# Patient Record
Sex: FEMALE | Race: WHITE | Employment: FULL TIME | URBAN - METROPOLITAN AREA
[De-identification: names, ages, dates, MRNs, and addresses within clinical notes are randomized per-mention and may not be internally consistent; named-entity substitution may affect disease eponyms.]

---

## 2017-01-02 ENCOUNTER — HOSPITAL ENCOUNTER (OUTPATIENT)
Age: 22
Discharge: HOME | End: 2017-01-02
Attending: EMERGENCY MEDICINE

## 2017-01-02 VITALS
BODY MASS INDEX: 31.98 KG/M2 | HEART RATE: 99 BPM | RESPIRATION RATE: 12 BRPM | SYSTOLIC BLOOD PRESSURE: 120 MMHG | DIASTOLIC BLOOD PRESSURE: 74 MMHG | HEIGHT: 66 IN | TEMPERATURE: 99 F | WEIGHT: 199 LBS

## 2017-01-02 DIAGNOSIS — H57.89 IRRITATION OF LEFT EYE: Primary | ICD-10-CM

## 2017-01-02 PROCEDURE — 99213 OFFICE O/P EST LOW 20 MIN: CPT

## 2017-01-03 NOTE — ED PROVIDER NOTES
Patient Seen in: [address]    History   Patient presents with:  Irritation    Stated Complaint: Left Eye Irritation    HPI    40-year-old female who wears contacts presents for complaint of left eye irritation.   Patient Device --        Current:/74 mmHg  Pulse 99  Temp(Src) 98.8 °F (37.1 °C) (Temporal)  Resp 12  Ht 167.6 cm (5' 6\")  Wt 90.266 kg  BMI 32.13 kg/m2  LMP 12/07/2016 (Approximate)    Right Eye Chart Acuity: 20/30, Corrected    Left Eye Chart Acuity: 20/2 as soon as possible for a visit in 2 days        Medications Prescribed:  Current Discharge Medication List

## 2017-06-19 ENCOUNTER — OFFICE VISIT (OUTPATIENT)
Dept: OTHER | Facility: HOSPITAL | Age: 22
End: 2017-06-19
Attending: PREVENTIVE MEDICINE

## 2017-06-19 DIAGNOSIS — Z01.84 IMMUNITY STATUS TESTING: Primary | ICD-10-CM

## 2017-06-19 PROCEDURE — 86765 RUBEOLA ANTIBODY: CPT

## 2017-06-19 PROCEDURE — 86735 MUMPS ANTIBODY: CPT

## 2017-06-19 PROCEDURE — 86787 VARICELLA-ZOSTER ANTIBODY: CPT

## 2017-06-19 PROCEDURE — 86480 TB TEST CELL IMMUN MEASURE: CPT

## 2017-06-19 PROCEDURE — 86706 HEP B SURFACE ANTIBODY: CPT

## 2017-06-19 PROCEDURE — 86762 RUBELLA ANTIBODY: CPT

## 2017-07-15 ENCOUNTER — APPOINTMENT (OUTPATIENT)
Dept: CT IMAGING | Facility: HOSPITAL | Age: 22
End: 2017-07-15
Attending: NURSE PRACTITIONER
Payer: COMMERCIAL

## 2017-07-15 ENCOUNTER — HOSPITAL ENCOUNTER (EMERGENCY)
Facility: HOSPITAL | Age: 22
Discharge: HOME | End: 2017-07-15
Payer: COMMERCIAL

## 2017-07-15 ENCOUNTER — HOSPITAL ENCOUNTER (OUTPATIENT)
Age: 22
Discharge: HOME | End: 2017-07-15
Attending: EMERGENCY MEDICINE
Payer: COMMERCIAL

## 2017-07-15 VITALS
BODY MASS INDEX: 42.68 KG/M2 | RESPIRATION RATE: 18 BRPM | SYSTOLIC BLOOD PRESSURE: 122 MMHG | TEMPERATURE: 98 F | DIASTOLIC BLOOD PRESSURE: 84 MMHG | HEIGHT: 64 IN | OXYGEN SATURATION: 100 % | HEART RATE: 67 BPM | WEIGHT: 250 LBS

## 2017-07-15 VITALS
DIASTOLIC BLOOD PRESSURE: 83 MMHG | SYSTOLIC BLOOD PRESSURE: 126 MMHG | OXYGEN SATURATION: 100 % | WEIGHT: 250 LBS | HEIGHT: 64 IN | RESPIRATION RATE: 18 BRPM | BODY MASS INDEX: 42.68 KG/M2 | TEMPERATURE: 98 F | HEART RATE: 75 BPM

## 2017-07-15 DIAGNOSIS — R10.31 ABDOMINAL PAIN, RIGHT LOWER QUADRANT: Primary | ICD-10-CM

## 2017-07-15 DIAGNOSIS — I88.0 MESENTERIC ADENITIS: Primary | ICD-10-CM

## 2017-07-15 LAB
ALBUMIN SERPL BCP-MCNC: 3.9 G/DL (ref 3.5–4.8)
ALP SERPL-CCNC: 55 U/L (ref 32–100)
ALT SERPL-CCNC: 41 U/L (ref 14–54)
ANION GAP SERPL CALC-SCNC: 7 MMOL/L (ref 0–18)
AST SERPL-CCNC: 34 U/L (ref 15–41)
B-HCG UR QL: NEGATIVE
BACTERIA UR QL AUTO: NEGATIVE /HPF
BILIRUB DIRECT SERPL-MCNC: 0.2 MG/DL (ref 0–0.2)
BILIRUB SERPL-MCNC: 1 MG/DL (ref 0.3–1.2)
BILIRUB UR QL: NEGATIVE
BUN SERPL-MCNC: 10 MG/DL (ref 8–20)
BUN/CREAT SERPL: 16.4 (ref 10–20)
CALCIUM SERPL-MCNC: 8.9 MG/DL (ref 8.5–10.5)
CHLORIDE SERPL-SCNC: 107 MMOL/L (ref 95–110)
CO2 SERPL-SCNC: 24 MMOL/L (ref 22–32)
COLOR UR: YELLOW
CREAT SERPL-MCNC: 0.61 MG/DL (ref 0.5–1.5)
GLUCOSE SERPL-MCNC: 76 MG/DL (ref 70–99)
GLUCOSE UR-MCNC: NEGATIVE MG/DL
HGB UR QL STRIP.AUTO: NEGATIVE
LIPASE SERPL-CCNC: 14 U/L (ref 22–51)
NITRITE UR QL STRIP.AUTO: NEGATIVE
OSMOLALITY UR CALC.SUM OF ELEC: 284 MOSM/KG (ref 275–295)
PH UR: 5 [PH] (ref 5–8)
POTASSIUM SERPL-SCNC: 3.6 MMOL/L (ref 3.3–5.1)
PROT SERPL-MCNC: 6.6 G/DL (ref 5.9–8.4)
PROT UR-MCNC: NEGATIVE MG/DL
RBC #/AREA URNS AUTO: 5 /HPF
SODIUM SERPL-SCNC: 138 MMOL/L (ref 136–144)
SP GR UR STRIP: 1.02 (ref 1–1.03)
URINE BILIRUBIN: NEGATIVE
URINE BLOOD: NEGATIVE
URINE COLOR: YELLOW
URINE GLUCOSE: NEGATIVE MG/DL
URINE KETONES: NEGATIVE MG/DL
URINE NITRITE: NEGATIVE
URINE PH: 5.5
URINE PROTEIN: NEGATIVE MG /DL
URINE SPECIFIC GRAVITY: 1.01
URINE UROBILINOGEN: 0.2 MG/DL
UROBILINOGEN UR STRIP-ACNC: <2
VIT C UR-MCNC: NEGATIVE MG/DL
WBC #/AREA URNS AUTO: 9 /HPF

## 2017-07-15 PROCEDURE — 87086 URINE CULTURE/COLONY COUNT: CPT | Performed by: NURSE PRACTITIONER

## 2017-07-15 PROCEDURE — 81001 URINALYSIS AUTO W/SCOPE: CPT | Performed by: NURSE PRACTITIONER

## 2017-07-15 PROCEDURE — 99215 OFFICE O/P EST HI 40 MIN: CPT

## 2017-07-15 PROCEDURE — 74177 CT ABD & PELVIS W/CONTRAST: CPT | Performed by: NURSE PRACTITIONER

## 2017-07-15 PROCEDURE — 96360 HYDRATION IV INFUSION INIT: CPT

## 2017-07-15 PROCEDURE — 99284 EMERGENCY DEPT VISIT MOD MDM: CPT

## 2017-07-15 PROCEDURE — 85060 BLOOD SMEAR INTERPRETATION: CPT | Performed by: NURSE PRACTITIONER

## 2017-07-15 PROCEDURE — 83690 ASSAY OF LIPASE: CPT | Performed by: NURSE PRACTITIONER

## 2017-07-15 PROCEDURE — 81002 URINALYSIS NONAUTO W/O SCOPE: CPT

## 2017-07-15 PROCEDURE — 81025 URINE PREGNANCY TEST: CPT

## 2017-07-15 PROCEDURE — 80048 BASIC METABOLIC PNL TOTAL CA: CPT | Performed by: NURSE PRACTITIONER

## 2017-07-15 PROCEDURE — 80076 HEPATIC FUNCTION PANEL: CPT | Performed by: NURSE PRACTITIONER

## 2017-07-15 PROCEDURE — 85025 COMPLETE CBC W/AUTO DIFF WBC: CPT | Performed by: NURSE PRACTITIONER

## 2017-07-15 NOTE — ED INITIAL ASSESSMENT (HPI)
Pt sent from immediate care with c/o pain to right lower quadrant pain for 1 week. States she vomited yesterday, and feels some \"cold sweats\". Pain is worse with sitting or lying.

## 2017-07-15 NOTE — ED NOTES
Seen and examined by dr Juan Jose Pickering, pt to go to ed for further eval and management, npo instructed and maintained, report called to [facility], npo instructed and maintained

## 2017-07-15 NOTE — ED PROVIDER NOTES
Patient Seen in: [facility]    History   Patient presents with:  Abdomen/Flank Pain (GI/)    Stated Complaint: vomiting, abdominal pain    HPI    The patient is a 73-year-old female with past history depression, status 18   Ht 162.6 cm (5' 4\")   Wt 113.4 kg   LMP 07/14/2017 (Approximate)   SpO2 100%   BMI 42.91 kg/m²         Physical Exam    Constitutional: Well-developed obese in no acute distress  Head: Normocephalic, no swelling or tenderness  Eyes: Nonicteric sclera REBECCA Prince  78.    In 1 week        Medications Prescribed:  Current Discharge Medication List

## 2017-07-15 NOTE — ED NOTES
Prelim %:    Neut 50.7  Lymp 26.5  Mono 5.5  Eos 16.5  Baso 0.8    Absolute   Neut 5.0  Lymp 2.6  Mono 0.6  Eos 1.6  Baso 0.1

## 2017-07-15 NOTE — ED PROVIDER NOTES
Patient Seen in: [facility] Emergency Department    History   CC: abd pain  HPI: Rodrigo Charles 23year old female  who presents to the ER c/o diffuse right-sided abdominal pain that has been intermittently present over the past approximately 1 we %  O2 Device: None (Room air)    Current:/84   Pulse 67   Temp 98.4 °F (36.9 °C) (Oral)   Resp 18   Ht 162.6 cm (5' 4\")   Wt 113.4 kg   LMP 07/14/2017 (Approximate)   SpO2 100%   BMI 42.91 kg/m²         General - Appears well, non-toxic and in NAD time who also evaluated this patient. He will follow up on patient's blood, urine and CT results. Disposition and Plan     Clinical Impression:  No diagnosis found. Disposition:  There is no disposition on file for this visit.     Follow-up:  No fo

## 2017-07-17 LAB
BASOPHILS # BLD: 0.1 K/UL (ref 0–0.2)
BASOPHILS NFR BLD: 1 %
EOSINOPHIL # BLD: 1.6 K/UL (ref 0–0.7)
EOSINOPHIL NFR BLD: 17 %
ERYTHROCYTE [DISTWIDTH] IN BLOOD BY AUTOMATED COUNT: 13.6 % (ref 11–15)
HCT VFR BLD AUTO: 37.9 % (ref 35–48)
HGB BLD-MCNC: 12.7 G/DL (ref 12–16)
LYMPHOCYTES # BLD: 2.6 K/UL (ref 1–4)
LYMPHOCYTES NFR BLD: 27 %
MCH RBC QN AUTO: 28 PG (ref 27–32)
MCHC RBC AUTO-ENTMCNC: 33.6 G/DL (ref 32–37)
MCV RBC AUTO: 83.3 FL (ref 80–100)
MONOCYTES # BLD: 0.6 K/UL (ref 0–1)
MONOCYTES NFR BLD: 6 %
NEUTROPHILS # BLD AUTO: 5 K/UL (ref 1.8–7.7)
NEUTROPHILS NFR BLD: 51 %
PLATELET # BLD AUTO: 302 K/UL (ref 140–400)
PMV BLD AUTO: 8.5 FL (ref 7.4–10.3)
RBC # BLD AUTO: 4.55 M/UL (ref 3.7–5.4)
WBC # BLD AUTO: 10 K/UL (ref 4–11)

## 2017-08-20 ENCOUNTER — HOSPITAL ENCOUNTER (OUTPATIENT)
Age: 22
Discharge: HOME | End: 2017-08-20
Payer: COMMERCIAL

## 2017-08-20 ENCOUNTER — APPOINTMENT (OUTPATIENT)
Dept: GENERAL RADIOLOGY | Age: 22
End: 2017-08-20
Attending: NURSE PRACTITIONER
Payer: COMMERCIAL

## 2017-08-20 VITALS
TEMPERATURE: 98 F | OXYGEN SATURATION: 100 % | DIASTOLIC BLOOD PRESSURE: 102 MMHG | WEIGHT: 250 LBS | HEIGHT: 64 IN | HEART RATE: 73 BPM | RESPIRATION RATE: 18 BRPM | BODY MASS INDEX: 42.68 KG/M2 | SYSTOLIC BLOOD PRESSURE: 129 MMHG

## 2017-08-20 DIAGNOSIS — S62.91XA CLOSED FRACTURE OF RIGHT HAND, INITIAL ENCOUNTER: Primary | ICD-10-CM

## 2017-08-20 PROCEDURE — 73130 X-RAY EXAM OF HAND: CPT | Performed by: NURSE PRACTITIONER

## 2017-08-20 PROCEDURE — 99214 OFFICE O/P EST MOD 30 MIN: CPT

## 2017-08-20 PROCEDURE — 29125 APPL SHORT ARM SPLINT STATIC: CPT

## 2017-08-20 NOTE — ED PROVIDER NOTES
Patient presents with:  Hand Pain      HPI:     Bandar Turk is a 23year old female who presents today with a chief complaint of pain in the right hand with swelling after an injury that occurred 2 days ago.   The patient states she jammed her hand while aspect of the right hand that radiates into the digits. No wrist pain. No deformities. Right hand dominant.   Point Tenderness: No    BP (!) 129/102   Pulse 73   Temp 98 °F (36.7 °C) (Oral)   Resp 18   Ht 162.6 cm (5' 4\")   Wt 113.4 kg   LMP 08/18/2017 (Ex instructions for your condition today.     Follow Up with:  Bambi Martin MD  [facility]  [id number]    Schedule an appointment as soon as possible for a visit in 2 days      Loyd Ku

## 2017-08-20 NOTE — ED INITIAL ASSESSMENT (HPI)
PATIENT ARRIVED AMBULATORY TO ROOM C/O RIGHT HAND PAIN. PATIENT STATES \"I WAS OPENING UP AN UMBRELLA 2 DAYS AGO AND I JAMMED MY HAND\" NO NUMBNESS/TINGLING TO FINGERS.

## 2017-08-23 ENCOUNTER — LAB ENCOUNTER (OUTPATIENT)
Dept: LAB | Age: 22
End: 2017-08-23
Attending: ORTHOPAEDIC SURGERY
Payer: COMMERCIAL

## 2017-08-23 DIAGNOSIS — Q78.0 OSTEOGENESIS IMPERFECTA: Primary | ICD-10-CM

## 2017-08-23 LAB
CALCIUM SERPL-MCNC: 8.7 MG/DL (ref 8.5–10.5)
TSH SERPL-ACNC: 2.14 UIU/ML (ref 0.45–5.33)

## 2017-08-23 PROCEDURE — 84443 ASSAY THYROID STIM HORMONE: CPT

## 2017-08-23 PROCEDURE — 82310 ASSAY OF CALCIUM: CPT

## 2017-08-23 PROCEDURE — 82652 VIT D 1 25-DIHYDROXY: CPT

## 2017-08-23 PROCEDURE — 84445 ASSAY OF TSI GLOBULIN: CPT

## 2017-08-23 PROCEDURE — 36415 COLL VENOUS BLD VENIPUNCTURE: CPT

## 2017-08-29 LAB
1,25-DIHYDROXYVITAMIN D: 65.1 PG/ML
THYROID STIMULATING IMMUNOGLOBULIN: 93 %

## 2017-09-22 ENCOUNTER — OFFICE VISIT (OUTPATIENT)
Dept: OTHER | Facility: HOSPITAL | Age: 22
End: 2017-09-22
Attending: PREVENTIVE MEDICINE

## 2017-09-22 DIAGNOSIS — Z01.84 IMMUNITY STATUS TESTING: Primary | ICD-10-CM

## 2017-09-22 PROCEDURE — 86480 TB TEST CELL IMMUN MEASURE: CPT

## 2017-09-27 ENCOUNTER — APPOINTMENT (OUTPATIENT)
Dept: OTHER | Facility: HOSPITAL | Age: 22
End: 2017-09-27
Attending: PREVENTIVE MEDICINE

## 2017-09-27 LAB
M TB TUBERC IFN-G BLD QL: NEGATIVE
M TB TUBERC IFN-G/MITOGEN IGNF BLD: 0 IU/ML
M TB TUBERC IGNF/MITOGEN IGNF CONTROL: >10 IU/ML
MITOGEN IGNF BCKGRD COR BLD-ACNC: 0.02 IU/ML

## 2017-09-28 ENCOUNTER — HOSPITAL ENCOUNTER (OUTPATIENT)
Dept: BONE DENSITY | Age: 22
Discharge: HOME | End: 2017-09-28
Attending: ORTHOPAEDIC SURGERY
Payer: COMMERCIAL

## 2017-09-28 DIAGNOSIS — M81.8 PREMATURE OSTEOPOROSIS: ICD-10-CM

## 2017-09-28 PROCEDURE — 77080 DXA BONE DENSITY AXIAL: CPT | Performed by: ORTHOPAEDIC SURGERY

## 2017-11-25 ENCOUNTER — HOSPITAL ENCOUNTER (OUTPATIENT)
Age: 22
Discharge: HOME | End: 2017-11-25
Attending: EMERGENCY MEDICINE
Payer: COMMERCIAL

## 2017-11-25 VITALS
RESPIRATION RATE: 20 BRPM | TEMPERATURE: 99 F | OXYGEN SATURATION: 100 % | DIASTOLIC BLOOD PRESSURE: 91 MMHG | HEART RATE: 93 BPM | SYSTOLIC BLOOD PRESSURE: 153 MMHG

## 2017-11-25 DIAGNOSIS — J02.0 STREP PHARYNGITIS: ICD-10-CM

## 2017-11-25 DIAGNOSIS — R03.0 ELEVATED BLOOD PRESSURE READING: ICD-10-CM

## 2017-11-25 DIAGNOSIS — H10.31 ACUTE CONJUNCTIVITIS OF RIGHT EYE, UNSPECIFIED ACUTE CONJUNCTIVITIS TYPE: Primary | ICD-10-CM

## 2017-11-25 PROCEDURE — 87430 STREP A AG IA: CPT

## 2017-11-25 PROCEDURE — 99213 OFFICE O/P EST LOW 20 MIN: CPT

## 2017-11-25 PROCEDURE — 99214 OFFICE O/P EST MOD 30 MIN: CPT

## 2017-11-25 RX ORDER — AMOXICILLIN 875 MG/1
875 TABLET, COATED ORAL 2 TIMES DAILY
Qty: 20 TABLET | Refills: 0 | Status: SHIPPED | OUTPATIENT
Start: 2017-11-25 | End: 2017-12-05

## 2017-11-25 RX ORDER — GENTAMICIN SULFATE 3 MG/ML
1 SOLUTION/ DROPS OPHTHALMIC EVERY 4 HOURS
Qty: 5 ML | Refills: 0 | Status: SHIPPED | OUTPATIENT
Start: 2017-11-25 | End: 2017-11-30

## 2017-11-25 NOTE — ED PROVIDER NOTES
Patient Seen in: [address]    History   Patient presents with:   Eye Visual Problem (opthalmic)    Stated Complaint: Rt Eye Redness    HPI  Patient wears contacts which she supposed to dispose of every 2 weeks and she thi distress. Well appearing   HENT:   Head: Normocephalic and atraumatic. Right Ear: Tympanic membrane and external ear normal.   Left Ear: Tympanic membrane and external ear normal.   Nose: Mucosal edema and rhinorrhea present.    Mouth/Throat: Uvula is m pressure was reviewed and repeated and remained elevated. She was instructed to have her blood pressure reevaluated by her primary care doctor next week. Patient understands these instructions.     Disposition and Plan     Clinical Impression:  Acute conj

## 2017-11-25 NOTE — ED INITIAL ASSESSMENT (HPI)
REPORTS RIGHT EYE REDNESS SINCE YESTERDAY.  + WATERY DRAINAGE. PATIENT WITH HISTORY OF CONTACT USE. STATES SHE MAY HAVE KEPT HER CONTACTS IN \"TOO LONG. \"

## 2018-02-06 ENCOUNTER — OFFICE VISIT (OUTPATIENT)
Dept: FAMILY MEDICINE CLINIC | Facility: CLINIC | Age: 23
End: 2018-02-06

## 2018-02-06 VITALS
SYSTOLIC BLOOD PRESSURE: 120 MMHG | WEIGHT: 252 LBS | HEIGHT: 64 IN | BODY MASS INDEX: 43.02 KG/M2 | DIASTOLIC BLOOD PRESSURE: 70 MMHG

## 2018-02-06 DIAGNOSIS — L23.89 ALLERGIC CONTACT DERMATITIS DUE TO OTHER AGENTS: Primary | ICD-10-CM

## 2018-02-06 PROCEDURE — 99202 OFFICE O/P NEW SF 15 MIN: CPT | Performed by: FAMILY MEDICINE

## 2018-02-06 RX ORDER — BETAMETHASONE DIPROPIONATE 0.5 MG/G
1 OINTMENT TOPICAL 2 TIMES DAILY
Qty: 90 G | Refills: 0 | Status: SHIPPED | OUTPATIENT
Start: 2018-02-06 | End: 2021-03-02 | Stop reason: ALTCHOICE

## 2018-02-06 RX ORDER — METHYLPREDNISOLONE 4 MG/1
TABLET ORAL
Qty: 1 KIT | Refills: 1 | Status: SHIPPED | OUTPATIENT
Start: 2018-02-06 | End: 2018-04-05 | Stop reason: ALTCHOICE

## 2018-02-07 NOTE — PROGRESS NOTES
[facility] Office Note  Chief Complaint:   Patient presents with:  Rash: bilateral hands      HPI:   This is a 23year old female coming in for      Results for orders placed or performed during the hospital encounter o nourished, no apparent distress.   HEENT:  Head:  Normocephalic, atraumatic Eyes: EOMI, PERRLA, no scleral icterus, conjunctivae clear bilaterally, no eye discharge Ears: External normal. Nose: patent, no nasal discharge Throat:  No tonsillar erythema or ex complications from the treatments as a result of today.      Problem List:  Patient Active Problem List:     Calculus of gallbladder with acute cholecystitis without obstruction      Cesilia Yung MD  2/7/2018  6:50 AM

## 2018-04-05 ENCOUNTER — HOSPITAL ENCOUNTER (OUTPATIENT)
Age: 23
Discharge: HOME | End: 2018-04-05
Payer: COMMERCIAL

## 2018-04-05 VITALS
BODY MASS INDEX: 41.66 KG/M2 | OXYGEN SATURATION: 100 % | HEIGHT: 64 IN | SYSTOLIC BLOOD PRESSURE: 124 MMHG | TEMPERATURE: 98 F | HEART RATE: 72 BPM | WEIGHT: 244 LBS | DIASTOLIC BLOOD PRESSURE: 58 MMHG | RESPIRATION RATE: 20 BRPM

## 2018-04-05 DIAGNOSIS — R30.0 DYSURIA: Primary | ICD-10-CM

## 2018-04-05 PROCEDURE — 99214 OFFICE O/P EST MOD 30 MIN: CPT

## 2018-04-05 PROCEDURE — 87086 URINE CULTURE/COLONY COUNT: CPT | Performed by: NURSE PRACTITIONER

## 2018-04-05 PROCEDURE — 99213 OFFICE O/P EST LOW 20 MIN: CPT

## 2018-04-05 PROCEDURE — 81002 URINALYSIS NONAUTO W/O SCOPE: CPT

## 2018-04-05 RX ORDER — NITROFURANTOIN 25; 75 MG/1; MG/1
100 CAPSULE ORAL 2 TIMES DAILY
Qty: 14 CAPSULE | Refills: 0 | Status: SHIPPED | OUTPATIENT
Start: 2018-04-05 | End: 2018-04-12

## 2018-04-05 NOTE — ED PROVIDER NOTES
Patient presents with:  Urinary Symptoms (urologic)      HPI:     Rodrigo Charles is a 23year old female with no past medical history presents with urinary urgency, frequency, dysuria and pelvic pain for the last week. Pt denies any flank pain.  No nausea, provider and to return for any other concerns. Discussed with pt the importance of voiding when she gets the urge and not to hold her urine. Pt verbalized plan of care and states understanding.        Orders Placed This Encounter      POC Urinalysis Dipstic

## 2018-04-05 NOTE — ED INITIAL ASSESSMENT (HPI)
PATIENT ARRIVED AMBULATORY TO ROOM WITH SYMPTOMS OF A UTI X1 WEEK. +URINARY FREQUENCY. +URINARY URGENCY. +PAIN WITH URINATION. NO HEMATURIA. +ABDOMINAL PAIN. +LOWER BACK PAIN. NO N/V/D. NO FEVERS.  PATIENT ALSO C/O SINUS PRESSURE AND NASAL CONGESTION THAT S

## 2018-08-27 ENCOUNTER — HOSPITAL ENCOUNTER (OUTPATIENT)
Age: 23
Discharge: HOME | End: 2018-08-27
Attending: EMERGENCY MEDICINE
Payer: COMMERCIAL

## 2018-08-27 ENCOUNTER — HOSPITAL ENCOUNTER (EMERGENCY)
Facility: HOSPITAL | Age: 23
Discharge: STILL ADMITTED OR ADMINISTRATIVE | End: 2018-08-28
Payer: COMMERCIAL

## 2018-08-27 VITALS
DIASTOLIC BLOOD PRESSURE: 72 MMHG | OXYGEN SATURATION: 100 % | HEART RATE: 76 BPM | HEIGHT: 64 IN | WEIGHT: 293 LBS | RESPIRATION RATE: 16 BRPM | BODY MASS INDEX: 50.02 KG/M2 | SYSTOLIC BLOOD PRESSURE: 118 MMHG | TEMPERATURE: 98 F

## 2018-08-27 DIAGNOSIS — J02.0 ACUTE STREPTOCOCCAL PHARYNGITIS: ICD-10-CM

## 2018-08-27 DIAGNOSIS — N30.00 ACUTE CYSTITIS WITHOUT HEMATURIA: Primary | ICD-10-CM

## 2018-08-27 DIAGNOSIS — R10.31 ABDOMINAL PAIN, RIGHT LOWER QUADRANT: ICD-10-CM

## 2018-08-27 LAB
B-HCG UR QL: NEGATIVE
S PYO AG THROAT QL: POSITIVE

## 2018-08-27 PROCEDURE — 81003 URINALYSIS AUTO W/O SCOPE: CPT

## 2018-08-27 PROCEDURE — 99214 OFFICE O/P EST MOD 30 MIN: CPT

## 2018-08-27 PROCEDURE — 87430 STREP A AG IA: CPT

## 2018-08-27 PROCEDURE — 81025 URINE PREGNANCY TEST: CPT

## 2018-08-27 PROCEDURE — 99213 OFFICE O/P EST LOW 20 MIN: CPT

## 2018-08-28 LAB
BILIRUB UR QL STRIP: NEGATIVE
CLARITY UR: CLEAR
COLOR UR: YELLOW
GLUCOSE UR STRIP-MCNC: NEGATIVE MG/DL
HGB UR QL STRIP: NEGATIVE
KETONES UR STRIP-MCNC: NEGATIVE MG/DL
NITRITE UR QL STRIP: NEGATIVE
PH UR STRIP: 7.5 [PH]
PROT UR STRIP-MCNC: NEGATIVE MG/DL
SP GR UR STRIP: 1.02
UROBILINOGEN UR STRIP-ACNC: <2 MG/DL

## 2018-08-28 NOTE — ED INITIAL ASSESSMENT (HPI)
PATIENT ARRIVED AMBULATORY TO ROOM WITH MULTIPLE COMPLAINTS. SYMPTOMS STARTED 1 WEEK AGO. +RIGHT SIDED GENERALIZED ABDOMINAL PAIN INTERMITTENT. PATIENT STATES \"I FEEL LIKE MY ABDOMEN GETS REALLY HARD\" +SLIGHT DIARRHEA.  NO N/V. +INTERMITTENT GENERALIZED H

## 2018-08-30 ENCOUNTER — HOSPITAL ENCOUNTER (OUTPATIENT)
Age: 23
Discharge: HOME | End: 2018-08-30
Payer: COMMERCIAL

## 2018-08-30 VITALS
BODY MASS INDEX: 40.8 KG/M2 | WEIGHT: 239 LBS | RESPIRATION RATE: 18 BRPM | OXYGEN SATURATION: 100 % | DIASTOLIC BLOOD PRESSURE: 76 MMHG | TEMPERATURE: 99 F | SYSTOLIC BLOOD PRESSURE: 134 MMHG | HEART RATE: 66 BPM | HEIGHT: 64 IN

## 2018-08-30 DIAGNOSIS — T50.905A ADVERSE EFFECT OF DRUG, INITIAL ENCOUNTER: Primary | ICD-10-CM

## 2018-08-30 PROCEDURE — 99214 OFFICE O/P EST MOD 30 MIN: CPT

## 2018-08-30 PROCEDURE — 99213 OFFICE O/P EST LOW 20 MIN: CPT

## 2018-08-30 RX ORDER — NITROFURANTOIN 25; 75 MG/1; MG/1
100 CAPSULE ORAL 2 TIMES DAILY
Qty: 14 CAPSULE | Refills: 0 | Status: SHIPPED | OUTPATIENT
Start: 2018-08-30 | End: 2018-09-06

## 2018-08-30 RX ORDER — AZITHROMYCIN 500 MG/1
500 TABLET, FILM COATED ORAL DAILY
Qty: 5 TABLET | Refills: 0 | Status: SHIPPED | OUTPATIENT
Start: 2018-08-30 | End: 2018-09-04

## 2018-08-30 NOTE — ED INITIAL ASSESSMENT (HPI)
Seen here 8/27/18 and placed on keflex to treat UTI and strep infections. Broke out in rash to face \"red dots all over\" and itching today on day 3. No facial redness or swelling. No respiratory distress. Took benadryl and feels better.

## 2018-08-30 NOTE — ED PROVIDER NOTES
Patient Seen in: [address]    History   Patient presents with:  Rash Skin Problem (integumentary)    Stated Complaint: Medication Rxn    HPI    Patient is a 53-year-old female who presents for evaluation of possible medi Device: None (Room air)    Current:/76   Pulse 66   Temp 98.7 °F (37.1 °C) (Oral)   Resp 18   Ht 162.6 cm (5' 4\")   Wt 108.4 kg   LMP 08/08/2018 (Exact Date)   SpO2 100%   BMI 41.02 kg/m²         Physical Exam   Constitutional: She is oriented to [place] [id number]  [phone]    Schedule an appointment as soon as possible for a visit   2-3 days or go to ER for worsening symptoms      Medications Prescribed:  Current Discharge Medication List    START taking these medications    Nitrofurantoin Monohyd Macr

## 2018-09-10 ENCOUNTER — APPOINTMENT (OUTPATIENT)
Dept: GENERAL RADIOLOGY | Age: 23
End: 2018-09-10
Attending: EMERGENCY MEDICINE
Payer: COMMERCIAL

## 2018-09-10 ENCOUNTER — HOSPITAL ENCOUNTER (OUTPATIENT)
Age: 23
Discharge: HOME | End: 2018-09-10
Attending: EMERGENCY MEDICINE
Payer: COMMERCIAL

## 2018-09-10 VITALS
OXYGEN SATURATION: 100 % | BODY MASS INDEX: 40.8 KG/M2 | WEIGHT: 239 LBS | SYSTOLIC BLOOD PRESSURE: 132 MMHG | HEART RATE: 78 BPM | TEMPERATURE: 99 F | HEIGHT: 64 IN | RESPIRATION RATE: 18 BRPM | DIASTOLIC BLOOD PRESSURE: 85 MMHG

## 2018-09-10 DIAGNOSIS — J20.8 ACUTE VIRAL BRONCHITIS: ICD-10-CM

## 2018-09-10 DIAGNOSIS — J02.9 ACUTE VIRAL PHARYNGITIS: Primary | ICD-10-CM

## 2018-09-10 LAB — S PYO AG THROAT QL: NEGATIVE

## 2018-09-10 PROCEDURE — 99214 OFFICE O/P EST MOD 30 MIN: CPT

## 2018-09-10 PROCEDURE — 99213 OFFICE O/P EST LOW 20 MIN: CPT

## 2018-09-10 PROCEDURE — 87430 STREP A AG IA: CPT

## 2018-09-10 PROCEDURE — 71046 X-RAY EXAM CHEST 2 VIEWS: CPT | Performed by: EMERGENCY MEDICINE

## 2018-09-10 RX ORDER — BENZONATATE 200 MG/1
200 CAPSULE ORAL 3 TIMES DAILY PRN
Qty: 15 CAPSULE | Refills: 0 | Status: SHIPPED | OUTPATIENT
Start: 2018-09-10 | End: 2021-03-02 | Stop reason: ALTCHOICE

## 2018-09-10 NOTE — ED INITIAL ASSESSMENT (HPI)
PATIENT ARRIVED AMBULATORY TO ROOM C/O A NON PRODUCTIVE COUGH X5 DAYS. +NASAL CONGESTION. NO N/V/D. EASY NON LABORED RESPIRATIONS.

## 2018-09-10 NOTE — ED PROVIDER NOTES
Patient Seen in: [address] [facility]    History   Patient presents with:  Cough/URI    Stated Complaint: COUGH/FEVER    HPI    The patient is a 53-year-old female with history of urinary tract infection and allergic reaction to ce significant mucoid or purulent discharge, no erythema or edema of the mucosa  Pharynx: Minimal erythema without exudate, uvula midline, no drooling trismus or stridor  Neck: Supple without palpable adenopathy  CV: Regular rate and rhythm no murmur  Respira

## 2018-10-08 ENCOUNTER — OFFICE VISIT (OUTPATIENT)
Dept: OTHER | Facility: HOSPITAL | Age: 23
End: 2018-10-08
Attending: EMERGENCY MEDICINE

## 2018-10-08 ENCOUNTER — HOSPITAL ENCOUNTER (OUTPATIENT)
Dept: GENERAL RADIOLOGY | Facility: HOSPITAL | Age: 23
Discharge: HOME | End: 2018-10-08
Attending: EMERGENCY MEDICINE

## 2018-10-08 ENCOUNTER — ORDER TRANSCRIPTION (OUTPATIENT)
Dept: PHYSICAL THERAPY | Facility: HOSPITAL | Age: 23
End: 2018-10-08

## 2018-10-08 ENCOUNTER — HOSPITAL ENCOUNTER (EMERGENCY)
Facility: HOSPITAL | Age: 23
Discharge: LEFT BEFORE DISCHARGE | End: 2018-10-08
Payer: OTHER MISCELLANEOUS

## 2018-10-08 VITALS
HEART RATE: 69 BPM | TEMPERATURE: 99 F | RESPIRATION RATE: 20 BRPM | HEIGHT: 64 IN | SYSTOLIC BLOOD PRESSURE: 132 MMHG | OXYGEN SATURATION: 100 % | BODY MASS INDEX: 39.09 KG/M2 | WEIGHT: 229 LBS | DIASTOLIC BLOOD PRESSURE: 95 MMHG

## 2018-10-08 DIAGNOSIS — S46.912A LEFT SHOULDER STRAIN: Primary | ICD-10-CM

## 2018-10-08 DIAGNOSIS — Z00.00 ROUTINE GENERAL MEDICAL EXAMINATION AT A HEALTH CARE FACILITY: ICD-10-CM

## 2018-10-08 DIAGNOSIS — Z00.00 ROUTINE GENERAL MEDICAL EXAMINATION AT A HEALTH CARE FACILITY: Primary | ICD-10-CM

## 2018-10-08 PROCEDURE — 73030 X-RAY EXAM OF SHOULDER: CPT | Performed by: EMERGENCY MEDICINE

## 2018-10-08 NOTE — ED INITIAL ASSESSMENT (HPI)
Pt injured her shoulder last week Sept. 30th while she was at work. Pt states she was turning pt's and she injured her left shoulder.

## 2018-10-09 ENCOUNTER — OFFICE VISIT (OUTPATIENT)
Dept: PHYSICAL THERAPY | Facility: HOSPITAL | Age: 23
End: 2018-10-09
Attending: EMERGENCY MEDICINE

## 2018-10-09 DIAGNOSIS — S46.912A LEFT SHOULDER STRAIN: ICD-10-CM

## 2018-10-09 NOTE — PROGRESS NOTES
UPPER EXTREMITY EVALUATION:   Referring Physician: Dr. Yvonne Mckinney  Date of Onset: 9/30/18 Date of Service: 10/9/2018   Diagnosis: Left shoulder strain (B79.695J)    PATIENT SUMMARY:   Nolvia Tubbs is a 20year old y/o female who presents to therapy today wit Precautions: None     OBJECTIVE:   Observation/Posture: Poor posture - rounded shoulders, forward head    AROM:  L shoulder: flex 105, abd 125, IR to T12, ER 83, ext 58  R shoulder: flex 170, abd 170, IR to T7, ER 80, ext 73    L shoulder PROM: flex 15 instruction    Education or treatment limitation: None  Rehab Potential: good    Patient was advised of these findings, precautions, and treatment options and has agreed to actively participate in planning and for this course of care.     Thank you for your

## 2018-10-11 ENCOUNTER — OFFICE VISIT (OUTPATIENT)
Dept: PHYSICAL THERAPY | Facility: HOSPITAL | Age: 23
End: 2018-10-11
Attending: EMERGENCY MEDICINE

## 2018-10-11 NOTE — PROGRESS NOTES
Diagnosis: Left shoulder strain (V00.857R)  Authorized # of Visits: 6   ([address])       Next MD visit: none scheduled  Fall Risk: standard         Precautions: n/a           Medication Changes since last visit?: No  Subjective: \"I feel so sore my left

## 2018-10-12 ENCOUNTER — OFFICE VISIT (OUTPATIENT)
Dept: PHYSICAL THERAPY | Facility: HOSPITAL | Age: 23
End: 2018-10-12
Attending: EMERGENCY MEDICINE

## 2018-10-12 NOTE — PROGRESS NOTES
Diagnosis: Left shoulder strain (H52.869S)  Authorized # of Visits: 6   ([address])       Next MD visit: none scheduled  Fall Risk: standard         Precautions: n/a           Medication Changes since last visit?: No  Subjective: Patient states her should Patient will demo L shoulder AROM at least: flex/abd 160, IR to T7, ER 80 to be able to reach into an overhead cabinet and don a T-shirt without difficulty  3.  Patient will demo L shoulder strength at least 5/5 to be able to lift a 50# box to facilitate re

## 2018-10-15 ENCOUNTER — OFFICE VISIT (OUTPATIENT)
Dept: PHYSICAL THERAPY | Facility: HOSPITAL | Age: 23
End: 2018-10-15
Attending: EMERGENCY MEDICINE

## 2018-10-15 NOTE — PROGRESS NOTES
Diagnosis: Left shoulder strain (G12.716A)  Authorized # of Visits: 6   ([address])       Next MD visit: none scheduled  Fall Risk: standard         Precautions: n/a           Medication Changes since last visit?: No  Subjective: \"I fell pain but better Assessment: Patient respond with C-spine extension based exercises with left rot with OP and scapular strengthening exercises.  Patient easily tired and fatigued with exercises and decreased radiating symptom, need cue's for proper posture and exercis

## 2018-10-16 ENCOUNTER — OFFICE VISIT (OUTPATIENT)
Dept: PHYSICAL THERAPY | Facility: HOSPITAL | Age: 23
End: 2018-10-16
Attending: EMERGENCY MEDICINE

## 2018-10-16 NOTE — PROGRESS NOTES
Diagnosis: Left shoulder strain (A09.411S)  Authorized # of Visits: 6   ([address])       Next MD visit: 10/24/18  Fall Risk: standard         Precautions: n/a           Medication Changes since last visit?: No  Subjective: Patient reports she is still \" 1. Patient will be independent with HEP and it's progression  2. Patient will demo L shoulder AROM at least: flex/abd 160, IR to T7, ER 80 to be able to reach into an overhead cabinet and don a T-shirt without difficulty  3.  Patient will demo L shoulder

## 2018-10-23 ENCOUNTER — OFFICE VISIT (OUTPATIENT)
Dept: PHYSICAL THERAPY | Facility: HOSPITAL | Age: 23
End: 2018-10-23
Attending: EMERGENCY MEDICINE

## 2018-10-23 NOTE — PROGRESS NOTES
Diagnosis: Left shoulder strain (V25.745F)  Authorized # of Visits: 6   ([address])       Next MD visit: 10/24/18  Fall Risk: standard         Precautions: n/a           Medication Changes since last visit?: No  Subjective: Patient reports her arm is bett L rotation 10x  Seated c retr/ext 10x  Narrow rows/sh ext with RTB 20x ea  Wall push up 15x  OH pulleys 30x flexion/scaption Strength/ROM re-assessment  Prone scap retraction 20x  Prone T 20x  Prone W squeeze 20x  Seated c retraction 10x  Seated c retracti

## 2018-10-24 ENCOUNTER — APPOINTMENT (OUTPATIENT)
Dept: OTHER | Facility: HOSPITAL | Age: 23
End: 2018-10-24
Attending: EMERGENCY MEDICINE

## 2019-02-07 ENCOUNTER — OFFICE VISIT (OUTPATIENT)
Dept: NEUROLOGY | Facility: CLINIC | Age: 24
End: 2019-02-07
Payer: OTHER MISCELLANEOUS

## 2019-02-07 ENCOUNTER — TELEPHONE (OUTPATIENT)
Dept: NEUROLOGY | Facility: CLINIC | Age: 24
End: 2019-02-07

## 2019-02-07 VITALS — SYSTOLIC BLOOD PRESSURE: 122 MMHG | DIASTOLIC BLOOD PRESSURE: 80 MMHG | RESPIRATION RATE: 17 BRPM | HEART RATE: 78 BPM

## 2019-02-07 DIAGNOSIS — M79.18 MYOFASCIAL PAIN: ICD-10-CM

## 2019-02-07 DIAGNOSIS — M25.512 CHRONIC LEFT SHOULDER PAIN: ICD-10-CM

## 2019-02-07 DIAGNOSIS — S46.912A STRAIN OF LEFT SHOULDER, INITIAL ENCOUNTER: ICD-10-CM

## 2019-02-07 DIAGNOSIS — G89.29 CHRONIC LEFT SHOULDER PAIN: ICD-10-CM

## 2019-02-07 DIAGNOSIS — M54.2 TRIGGER POINT OF NECK: Primary | ICD-10-CM

## 2019-02-07 PROCEDURE — 99204 OFFICE O/P NEW MOD 45 MIN: CPT | Performed by: PHYSICAL MEDICINE & REHABILITATION

## 2019-02-07 NOTE — PATIENT INSTRUCTIONS
1) Start diclofenac 75 mg twice per day for the next 2 weeks and then as needed. Take with food. No other anti-inflammatories with this medication.  OK to take tylenol  2) Schedule an appointment with me for trigger point injections  3) Continue with physic

## 2019-02-07 NOTE — TELEPHONE ENCOUNTER
Called [employer]  for authorization of approval of Trigger point injections cpt code . Claim # [id number]  L/m on [facility] adjustor's v/m requesting above.  Will fax clinical note in the am.

## 2019-02-07 NOTE — H&P
[address] H&P    Requesting Physician: Jayde López MD    Chief Complaint (Reason for Visit):  Patient presents with:  Shoulder Pain: Left Shoulder pain, pt injuried herself at work.  Shes a CNA, wh [employer]          FAMILY HISTORY:   Family History   Problem Relation Age of Onset   • Other (Other) Mother    • Hypertension Maternal Grandmother    • Hypertension Maternal Grandfather        SOCIAL HISTORY:   Social History    Occupational History No auricular hematoma or deformities  Mouth: No lesions or ulcerations  Heart: peripheral pulses intact. Normal capillary refill.    Lungs: Non-labored respirations  Abdomen: No abdominal guarding  Extremities: No lower extremity edema bilaterally   Skin: N on 09/10/2018   Component Date Value Ref Range Status   • POCT Rapid Strep 09/10/2018 Negative  Negative Final   Admission on 08/27/2018, Discharged on 08/27/2018   Component Date Value Ref Range Status   • POCT Urine Pregnancy 08/27/2018 Negative  Negativ cervical spine performed in November 2018 demonstrates normal anatomy without any disc herniations, cord compression, or foraminal stenosis noted.   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 The patient was in agreement with the assessment and plan. All questions were answered. There were no barriers to learning.          Trigger point of neck  (pr

## 2019-02-11 ENCOUNTER — TELEPHONE (OUTPATIENT)
Dept: NEUROLOGY | Facility: CLINIC | Age: 24
End: 2019-02-11

## 2019-02-11 RX ORDER — DICLOFENAC SODIUM 75 MG/1
TABLET, DELAYED RELEASE ORAL
Qty: 60 TABLET | Refills: 0 | Status: SHIPPED | OUTPATIENT
Start: 2019-02-11 | End: 2019-03-09

## 2019-02-11 NOTE — TELEPHONE ENCOUNTER
S/w patient and mother (HIPAA verified) stating they have been waiting for medication that was suggested at [facility] 2/7/19 since 2/7/19. Confirmed Diclofenac per LOV note. Prescription sent to confirmed pharmacy ([facility]).     Dr. Lawrence Gautam

## 2019-02-12 ENCOUNTER — MED REC SCAN ONLY (OUTPATIENT)
Dept: NEUROLOGY | Facility: CLINIC | Age: 24
End: 2019-02-12

## 2019-02-22 ENCOUNTER — HOSPITAL ENCOUNTER (OUTPATIENT)
Age: 24
Discharge: HOME | End: 2019-02-22
Attending: FAMILY MEDICINE
Payer: COMMERCIAL

## 2019-02-22 VITALS
RESPIRATION RATE: 20 BRPM | HEIGHT: 64 IN | TEMPERATURE: 98 F | HEART RATE: 91 BPM | OXYGEN SATURATION: 100 % | SYSTOLIC BLOOD PRESSURE: 154 MMHG | WEIGHT: 245 LBS | BODY MASS INDEX: 41.83 KG/M2 | DIASTOLIC BLOOD PRESSURE: 86 MMHG

## 2019-02-22 DIAGNOSIS — J02.0 STREP PHARYNGITIS: Primary | ICD-10-CM

## 2019-02-22 LAB — S PYO AG THROAT QL: POSITIVE

## 2019-02-22 PROCEDURE — 99213 OFFICE O/P EST LOW 20 MIN: CPT

## 2019-02-22 PROCEDURE — 87430 STREP A AG IA: CPT

## 2019-02-22 PROCEDURE — 99214 OFFICE O/P EST MOD 30 MIN: CPT

## 2019-02-22 RX ORDER — AZITHROMYCIN 250 MG/1
TABLET, FILM COATED ORAL
Qty: 1 PACKAGE | Refills: 0 | Status: SHIPPED | OUTPATIENT
Start: 2019-02-22 | End: 2019-02-27

## 2019-02-22 NOTE — ED PROVIDER NOTES
Patient Seen in: [facility]    History   Patient presents with:  Sore Throat    Stated Complaint: SORE THROAT/COUGH    HPI    Patient here with nasal congestion fever and  sore throat for 2 days.   No travel, positive sick otherwise stated in HPI.     Physical Exam     ED Triage Vitals [02/22/19 1229]   /86   Pulse 91   Resp 20   Temp 98 °F (36.7 °C)   Temp src Oral   SpO2 100 %   O2 Device None (Room air)       Current:/86   Pulse 91   Temp 98 °F (36.7 °C) (Oral)

## 2019-02-22 NOTE — ED INITIAL ASSESSMENT (HPI)
PATIENT ARRIVED AMBULATORY TO ROOM C/O SYMPTOMS X2 DAYS. +SORE THROAT. +NASAL CONGESTION. NO FEVERS. MOTHER WAS IN THE IC AND POSITIVE FOR STREP TODAY. NO N/V/D. EASY NON LABORED RESPIRATIONS.

## 2019-03-01 NOTE — TELEPHONE ENCOUNTER
Received fax from [address] [place] [address] advising of denial for trigger point injections. Next option appeal. PLaced on Dr. Mildred Perales desk for further options.

## 2019-03-10 NOTE — TELEPHONE ENCOUNTER
Diclofenac 75mg. Take 1 tablet BID prn. #60. No refills.     Last filled on 2/11/2019      LOV-2/7/2019  NOV-none

## 2019-03-11 RX ORDER — DICLOFENAC SODIUM 75 MG/1
TABLET, DELAYED RELEASE ORAL
Qty: 60 TABLET | Refills: 0 | Status: SHIPPED | OUTPATIENT
Start: 2019-03-11 | End: 2021-03-02 | Stop reason: ALTCHOICE

## 2019-09-05 ENCOUNTER — OFFICE VISIT (OUTPATIENT)
Dept: NEUROLOGY | Facility: CLINIC | Age: 24
End: 2019-09-05
Payer: OTHER MISCELLANEOUS

## 2019-09-05 VITALS — HEART RATE: 76 BPM | SYSTOLIC BLOOD PRESSURE: 126 MMHG | DIASTOLIC BLOOD PRESSURE: 82 MMHG

## 2019-09-05 DIAGNOSIS — M79.18 MYOFASCIAL PAIN: ICD-10-CM
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
## 2019-09-05 DIAGNOSIS — R20.2 NUMBNESS AND TINGLING IN LEFT HAND: ICD-10-CM

## 2019-09-05 DIAGNOSIS — S46.912A STRAIN OF LEFT SHOULDER, INITIAL ENCOUNTER: ICD-10-CM

## 2019-09-05 DIAGNOSIS — R20.0 NUMBNESS AND TINGLING IN LEFT HAND: ICD-10-CM

## 2019-09-05 DIAGNOSIS — M54.2 TRIGGER POINT OF NECK: Primary | ICD-10-CM

## 2019-09-05 DIAGNOSIS — M25.512 CHRONIC LEFT SHOULDER PAIN: ICD-10-CM

## 2019-09-05 DIAGNOSIS — G89.29 CHRONIC LEFT SHOULDER PAIN: ICD-10-CM

## 2019-09-05 DIAGNOSIS — M79.602 LEFT ARM PAIN: ICD-10-CM

## 2019-09-05 PROCEDURE — 20553 NJX 1/MLT TRIGGER POINTS 3/>: CPT | Performed by: PHYSICAL MEDICINE & REHABILITATION

## 2019-09-05 RX ORDER — LIDOCAINE HYDROCHLORIDE 10 MG/ML
3 INJECTION, SOLUTION INFILTRATION; PERINEURAL ONCE
Status: COMPLETED | OUTPATIENT
Start: 2019-09-05 | End: 2019-09-05

## 2019-09-05 RX ADMIN — LIDOCAINE HYDROCHLORIDE 3 ML: 10 INJECTION, SOLUTION INFILTRATION; PERINEURAL at 16:34:00

## 2019-09-05 NOTE — TELEPHONE ENCOUNTER
Received fax from [facility] adjustor(telephone number [phone]) advising of approval for trigger point Injections. Pt. is scheduled for injections today.

## 2019-09-05 NOTE — PROCEDURES
Procedure: Trigger point injections    Indication: left neck and shoulder pain    Consent: Informed consent was obtained from patient.  Patient was explained the risks, benefits and alternatives of procedure, risks including but not limited to bleeding, in

## 2020-01-18 ENCOUNTER — HOSPITAL ENCOUNTER (OUTPATIENT)
Age: 25
Discharge: HOME | End: 2020-01-18
Attending: FAMILY MEDICINE

## 2020-01-18 ENCOUNTER — APPOINTMENT (OUTPATIENT)
Dept: GENERAL RADIOLOGY | Age: 25
End: 2020-01-18
Attending: FAMILY MEDICINE

## 2020-01-18 VITALS
HEIGHT: 64 IN | DIASTOLIC BLOOD PRESSURE: 90 MMHG | WEIGHT: 256 LBS | HEART RATE: 88 BPM | SYSTOLIC BLOOD PRESSURE: 150 MMHG | BODY MASS INDEX: 43.71 KG/M2 | TEMPERATURE: 99 F | OXYGEN SATURATION: 99 % | RESPIRATION RATE: 16 BRPM

## 2020-01-18 DIAGNOSIS — S82.892A CLOSED FRACTURE OF LEFT ANKLE, INITIAL ENCOUNTER: Primary | ICD-10-CM

## 2020-01-18 PROCEDURE — 29515 APPLICATION SHORT LEG SPLINT: CPT

## 2020-01-18 PROCEDURE — 73610 X-RAY EXAM OF ANKLE: CPT | Performed by: FAMILY MEDICINE

## 2020-01-18 PROCEDURE — 99214 OFFICE O/P EST MOD 30 MIN: CPT

## 2020-01-18 PROCEDURE — 73630 X-RAY EXAM OF FOOT: CPT | Performed by: FAMILY MEDICINE

## 2020-01-18 RX ORDER — IBUPROFEN 600 MG/1
600 TABLET ORAL ONCE
Status: COMPLETED | OUTPATIENT
Start: 2020-01-18 | End: 2020-01-18

## 2020-01-18 NOTE — ED PROVIDER NOTES
Patient Seen in: [address]    History   Patient presents with:  Lower Extremity Injury    Stated Complaint: L ankle paiin    HPI    HPI: Levi Scott is a 30year old female who presents after an injury to the left ank ankle paiin  Other systems are as noted in HPI. Constitutional and vital signs reviewed. All other systems reviewed and negative except as noted above. PSFH elements reviewed from today and agreed except as otherwise stated in HPI.     Physical [place] [id number]  [phone]    In 3 days        Medications Prescribed:  Current Discharge Medication List

## 2020-01-18 NOTE — ED INITIAL ASSESSMENT (HPI)
To room 1 via wheelchair. Patient with left ankle and foot pain after twisting it and falling in the snow yesterday evening.  + swelling and bruising. Patient states she is unable to bear weight.

## 2020-12-07 ENCOUNTER — HOSPITAL ENCOUNTER (OUTPATIENT)
Age: 25
Discharge: HOME | End: 2020-12-07
Attending: EMERGENCY MEDICINE
Payer: COMMERCIAL

## 2020-12-07 ENCOUNTER — TELEPHONE (OUTPATIENT)
Dept: FAMILY MEDICINE CLINIC | Facility: CLINIC | Age: 25
End: 2020-12-07

## 2020-12-07 VITALS
OXYGEN SATURATION: 100 % | SYSTOLIC BLOOD PRESSURE: 160 MMHG | DIASTOLIC BLOOD PRESSURE: 88 MMHG | HEART RATE: 84 BPM | TEMPERATURE: 98 F | RESPIRATION RATE: 20 BRPM

## 2020-12-07 DIAGNOSIS — J02.0 STREP PHARYNGITIS: Primary | ICD-10-CM

## 2020-12-07 DIAGNOSIS — U07.1 COVID-19 VIRUS DETECTED: Primary | ICD-10-CM

## 2020-12-07 PROCEDURE — 99214 OFFICE O/P EST MOD 30 MIN: CPT

## 2020-12-07 PROCEDURE — 87430 STREP A AG IA: CPT

## 2020-12-07 PROCEDURE — 99213 OFFICE O/P EST LOW 20 MIN: CPT

## 2020-12-07 RX ORDER — AZITHROMYCIN 250 MG/1
TABLET, FILM COATED ORAL
Qty: 1 PACKAGE | Refills: 0 | Status: SHIPPED | OUTPATIENT
Start: 2020-12-07 | End: 2020-12-12

## 2020-12-07 RX ORDER — CODEINE PHOSPHATE AND GUAIFENESIN 10; 100 MG/5ML; MG/5ML
5 SOLUTION ORAL EVERY EVENING
Qty: 118 ML | Refills: 0 | Status: SHIPPED | OUTPATIENT
Start: 2020-12-07 | End: 2021-03-02 | Stop reason: ALTCHOICE

## 2020-12-07 NOTE — ED PROVIDER NOTES
Patient Seen in: [facility]      History   Patient presents with:  Cough/URI    Stated Complaint: cough    HPI    Pt is 30 y F who p/w 2-3 days of cough and chest congestion. +chills and body aches. +sore throat.  Pt had negative covid test at [facility] - Abnormal; Notable for the following components:       Result Value    POCT Rapid Strep Positive (*)     All other components within normal limits       MDM      Pulse ox 100% on room air, normal.  Patient is going to get a rapid Covid test at followed by 250 mg daily x 4 days  Qty: 1 Package Refills: 0    guaiFENesin-codeine (CHERATUSSIN AC) 100-10 MG/5ML Oral Solution  Take 5 mL by mouth every evening.   Qty: 118 mL Refills: 0

## 2020-12-07 NOTE — TELEPHONE ENCOUNTER
Pt wanted to let Yue Rahman know that the pt did test positive at work for [employer] today.  uYe Rahman RN station notified

## 2020-12-21 ENCOUNTER — HOSPITAL ENCOUNTER (EMERGENCY)
Facility: HOSPITAL | Age: 25
Discharge: HOME | End: 2020-12-21
Attending: EMERGENCY MEDICINE
Payer: COMMERCIAL

## 2020-12-21 ENCOUNTER — APPOINTMENT (OUTPATIENT)
Dept: GENERAL RADIOLOGY | Facility: HOSPITAL | Age: 25
End: 2020-12-21
Attending: EMERGENCY MEDICINE
Payer: COMMERCIAL

## 2020-12-21 VITALS
DIASTOLIC BLOOD PRESSURE: 96 MMHG | OXYGEN SATURATION: 99 % | WEIGHT: 200 LBS | RESPIRATION RATE: 14 BRPM | SYSTOLIC BLOOD PRESSURE: 146 MMHG | HEIGHT: 64 IN | TEMPERATURE: 98 F | BODY MASS INDEX: 34.15 KG/M2 | HEART RATE: 79 BPM

## 2020-12-21 DIAGNOSIS — U07.1 COVID-19 VIRUS INFECTION: ICD-10-CM

## 2020-12-21 DIAGNOSIS — R07.9 CHEST PAIN OF UNCERTAIN ETIOLOGY: Primary | ICD-10-CM

## 2020-12-21 PROCEDURE — 93010 ELECTROCARDIOGRAM REPORT: CPT | Performed by: EMERGENCY MEDICINE

## 2020-12-21 PROCEDURE — 99284 EMERGENCY DEPT VISIT MOD MDM: CPT

## 2020-12-21 PROCEDURE — 71045 X-RAY EXAM CHEST 1 VIEW: CPT | Performed by: EMERGENCY MEDICINE

## 2020-12-21 PROCEDURE — 93005 ELECTROCARDIOGRAM TRACING: CPT

## 2020-12-21 RX ORDER — ALBUTEROL SULFATE 90 UG/1
2 AEROSOL, METERED RESPIRATORY (INHALATION) EVERY 4 HOURS PRN
Qty: 1 INHALER | Refills: 0 | Status: SHIPPED | OUTPATIENT
Start: 2020-12-21 | End: 2021-01-20

## 2020-12-21 NOTE — ED PROVIDER NOTES
Patient Seen in: [facility] AND [facility] Emergency Department      History   Patient presents with:  Chest Pain Angina    Stated Complaint: CP/ cough     HPI    49-year-old female presents the ER with complaint of chest pressure.   Patient was diagnosed with CO Musculoskeletal: Normal range of motion and neck supple. Cardiovascular:      Rate and Rhythm: Normal rate and regular rhythm. Pulses: Normal pulses. Heart sounds: Normal heart sounds.    Pulmonary:      Effort: Pulmonary effort is normal. encounter diagnosis)  COVID-19 virus infection    Disposition:  Discharge  12/21/2020 11:58 am    Follow-up:  Janiya Langford MD  [address] [place] [address]  [phone]    Schedule an appointment as soon as possible for

## 2020-12-28 ENCOUNTER — HOSPITAL ENCOUNTER (EMERGENCY)
Facility: HOSPITAL | Age: 25
Discharge: HOME | End: 2020-12-28
Attending: EMERGENCY MEDICINE
Payer: COMMERCIAL

## 2020-12-28 ENCOUNTER — APPOINTMENT (OUTPATIENT)
Dept: GENERAL RADIOLOGY | Facility: HOSPITAL | Age: 25
End: 2020-12-28
Attending: EMERGENCY MEDICINE
Payer: COMMERCIAL

## 2020-12-28 VITALS
HEART RATE: 87 BPM | DIASTOLIC BLOOD PRESSURE: 82 MMHG | RESPIRATION RATE: 20 BRPM | HEIGHT: 64 IN | BODY MASS INDEX: 34.15 KG/M2 | TEMPERATURE: 99 F | OXYGEN SATURATION: 99 % | SYSTOLIC BLOOD PRESSURE: 125 MMHG | WEIGHT: 200 LBS

## 2020-12-28 DIAGNOSIS — R06.02 SHORTNESS OF BREATH: Primary | ICD-10-CM

## 2020-12-28 DIAGNOSIS — R07.89 CHEST PAIN, MUSCULOSKELETAL: ICD-10-CM

## 2020-12-28 DIAGNOSIS — U07.1 COVID-19: ICD-10-CM

## 2020-12-28 PROCEDURE — 71045 X-RAY EXAM CHEST 1 VIEW: CPT | Performed by: EMERGENCY MEDICINE

## 2020-12-28 PROCEDURE — 93005 ELECTROCARDIOGRAM TRACING: CPT

## 2020-12-28 PROCEDURE — 93010 ELECTROCARDIOGRAM REPORT: CPT | Performed by: EMERGENCY MEDICINE

## 2020-12-28 PROCEDURE — 99284 EMERGENCY DEPT VISIT MOD MDM: CPT

## 2020-12-28 PROCEDURE — 81025 URINE PREGNANCY TEST: CPT

## 2020-12-28 RX ORDER — GUAIFENESIN 100 MG/5ML
100 SOLUTION ORAL ONCE
Status: COMPLETED | OUTPATIENT
Start: 2020-12-28 | End: 2020-12-28

## 2020-12-28 RX ORDER — BENZONATATE 100 MG/1
100 CAPSULE ORAL 3 TIMES DAILY PRN
Qty: 30 CAPSULE | Refills: 0 | Status: SHIPPED | OUTPATIENT
Start: 2020-12-28 | End: 2021-01-27

## 2020-12-28 RX ORDER — KETOROLAC TROMETHAMINE 10 MG/1
10 TABLET, FILM COATED ORAL EVERY 6 HOURS PRN
Qty: 20 TABLET | Refills: 0 | Status: SHIPPED | OUTPATIENT
Start: 2020-12-28 | End: 2021-01-04

## 2020-12-28 RX ORDER — IBUPROFEN 600 MG/1
600 TABLET ORAL ONCE
Status: COMPLETED | OUTPATIENT
Start: 2020-12-28 | End: 2020-12-28

## 2020-12-28 NOTE — ED PROVIDER NOTES
Patient Seen in: [facility] AND [facility] Emergency Department      History   Patient presents with:  Difficulty Breathing    Stated Complaint: Still doesn't feel better- dx COVID 12/7    HPI    25yoF with hx of COVID 19, here with c/o worsening shortness of br °C)   Temp src Oral   SpO2 100 %   O2 Device None (Room air)       Current:/72   Pulse 73   Temp 98.6 °F (37 °C) (Oral)   Resp 20   Ht 162.6 cm (5' 4\")   Wt 90.7 kg   LMP 12/21/2020 (Exact Date)   SpO2 100%   BMI 34.33 kg/m²         Physical Exam  V MD on 12/28/2020 at 1:20 PM              EMERGENCY DEPARTMENT COURSE AND TREATMENT:  Patient's condition was stable during Emergency Department evaluation.      25yoF with shortness of breath  - I personally reviewed and interpreted all the ED vitals  - afe total) by mouth 3 (three) times daily as needed for cough. Qty: 30 capsule Refills: 0    Ketorolac Tromethamine 10 MG Oral Tab  Take 1 tablet (10 mg total) by mouth every 6 (six) hours as needed for Pain.   Qty: 20 tablet Refills: 0    !! - Potential lazarali

## 2020-12-28 NOTE — ED INITIAL ASSESSMENT (HPI)
Pt came in for continued SOB/CP. Diagnosed with COVID 3 weeks ago. Reports she \"feels worse\" and is unable to get into her PCP. RR shallow and nonlabored, speaking in full sentences, ambulatory with slow gait.

## 2021-01-08 ENCOUNTER — HOSPITAL ENCOUNTER (OUTPATIENT)
Age: 26
Discharge: HOME | End: 2021-01-08
Payer: COMMERCIAL

## 2021-01-08 VITALS
TEMPERATURE: 99 F | HEIGHT: 64 IN | DIASTOLIC BLOOD PRESSURE: 86 MMHG | RESPIRATION RATE: 18 BRPM | WEIGHT: 200 LBS | BODY MASS INDEX: 34.15 KG/M2 | HEART RATE: 93 BPM | OXYGEN SATURATION: 100 % | SYSTOLIC BLOOD PRESSURE: 138 MMHG

## 2021-01-08 DIAGNOSIS — R07.81 PLEURITIC CHEST PAIN: ICD-10-CM

## 2021-01-08 DIAGNOSIS — R05.9 COUGH: Primary | ICD-10-CM

## 2021-01-08 LAB
#MXD IC: 0.7 X10ˆ3/UL (ref 0.1–1)
CREAT BLD-MCNC: 0.6 MG/DL (ref 0.55–1.02)
DDIMER WHOLE BLOOD: <200 NG/ML DDU (ref ?–400)
GLUCOSE BLD-MCNC: 84 MG/DL (ref 70–99)
HCT VFR BLD AUTO: 39.3 %
HGB BLD-MCNC: 12.7 G/DL
ISTAT BUN: 13 MG/DL (ref 7–18)
ISTAT CHLORIDE: 106 MMOL/L (ref 98–112)
ISTAT HEMATOCRIT: 39 %
ISTAT IONIZED CALCIUM FOR CHEM 8: 1.24 MMOL/L (ref 1.12–1.32)
ISTAT POTASSIUM: 3.8 MMOL/L (ref 3.6–5.1)
ISTAT SODIUM: 141 MMOL/L (ref 136–145)
ISTAT TCO2: 24 MMOL/L (ref 21–32)
LYMPHOCYTES # BLD AUTO: 2.2 X10ˆ3/UL (ref 1–4)
LYMPHOCYTES NFR BLD AUTO: 24.3 %
MCH RBC QN AUTO: 26.9 PG (ref 26–34)
MCHC RBC AUTO-ENTMCNC: 32.3 G/DL (ref 31–37)
MCV RBC AUTO: 83.3 FL (ref 80–100)
MIXED CELL %: 7.4 %
NEUTROPHILS # BLD AUTO: 6.1 X10ˆ3/UL (ref 1.5–7.7)
NEUTROPHILS NFR BLD AUTO: 68.3 %
PLATELET # BLD AUTO: 308 X10ˆ3/UL (ref 150–450)
RBC # BLD AUTO: 4.72 X10ˆ6/UL
TROPONIN I BLD-MCNC: <0.02 NG/ML
WBC # BLD AUTO: 9 X10ˆ3/UL (ref 4–11)

## 2021-01-08 PROCEDURE — 99214 OFFICE O/P EST MOD 30 MIN: CPT

## 2021-01-08 PROCEDURE — 80047 BASIC METABLC PNL IONIZED CA: CPT

## 2021-01-08 PROCEDURE — 93010 ELECTROCARDIOGRAM REPORT: CPT

## 2021-01-08 PROCEDURE — 84484 ASSAY OF TROPONIN QUANT: CPT

## 2021-01-08 PROCEDURE — 36415 COLL VENOUS BLD VENIPUNCTURE: CPT

## 2021-01-08 PROCEDURE — 93005 ELECTROCARDIOGRAM TRACING: CPT

## 2021-01-08 PROCEDURE — 85378 FIBRIN DEGRADE SEMIQUANT: CPT | Performed by: NURSE PRACTITIONER

## 2021-01-08 PROCEDURE — 85025 COMPLETE CBC W/AUTO DIFF WBC: CPT | Performed by: NURSE PRACTITIONER

## 2021-01-08 PROCEDURE — 93010 ELECTROCARDIOGRAM REPORT: CPT | Performed by: NURSE PRACTITIONER

## 2021-01-08 NOTE — ED PROVIDER NOTES
Patient Seen in: [facility]      History   Patient presents with:  Cough/URI    Stated Complaint: positive for covid and cough is getting worse and chest hurts    HPI/Subjective:    Well appearing 31 y/o female presents with c/o a persistent d 0932 Temporal   SpO2 01/08/21 0930 100 %   O2 Device 01/08/21 0930 None (Room air)       Current:/86   Pulse 93   Temp 99.1 °F (37.3 °C) (Temporal)   Resp 18   Ht 162.6 cm (5' 4\")   Wt 90.7 kg   LMP 12/21/2020 (Exact Date)   SpO2 100%   BMI 34.33 kg OTC ibuprofen/acetaminophen and cough suppressant.    Discussed case with Dr. Edward Wagner who agrees with plan of care   F/u with PMD   Return if any concerns                Disposition and Plan     Clinical Impression:  Cough  (primary encounter diagnosis)  Pl

## 2021-01-08 NOTE — ED INITIAL ASSESSMENT (HPI)
Pt presents with a cough and chest pain x 1 month. Pt tested positive for covid on December 7th and states cough has not gotten better.

## 2021-01-19 ENCOUNTER — HOSPITAL ENCOUNTER (EMERGENCY)
Facility: HOSPITAL | Age: 26
Discharge: HOME | End: 2021-01-19
Attending: EMERGENCY MEDICINE
Payer: COMMERCIAL

## 2021-01-19 ENCOUNTER — APPOINTMENT (OUTPATIENT)
Dept: GENERAL RADIOLOGY | Facility: HOSPITAL | Age: 26
End: 2021-01-19
Attending: EMERGENCY MEDICINE
Payer: COMMERCIAL

## 2021-01-19 VITALS
WEIGHT: 255 LBS | SYSTOLIC BLOOD PRESSURE: 117 MMHG | HEART RATE: 79 BPM | BODY MASS INDEX: 43.54 KG/M2 | RESPIRATION RATE: 20 BRPM | DIASTOLIC BLOOD PRESSURE: 74 MMHG | HEIGHT: 64 IN | OXYGEN SATURATION: 98 % | TEMPERATURE: 98 F

## 2021-01-19 DIAGNOSIS — T18.128A ESOPHAGEAL OBSTRUCTION DUE TO FOOD IMPACTION: Primary | ICD-10-CM

## 2021-01-19 DIAGNOSIS — K22.2 ESOPHAGEAL OBSTRUCTION DUE TO FOOD IMPACTION: Primary | ICD-10-CM

## 2021-01-19 PROCEDURE — 99283 EMERGENCY DEPT VISIT LOW MDM: CPT

## 2021-01-19 PROCEDURE — S0028 INJECTION, FAMOTIDINE, 20 MG: HCPCS | Performed by: EMERGENCY MEDICINE

## 2021-01-19 RX ORDER — FAMOTIDINE 10 MG/ML
20 INJECTION, SOLUTION INTRAVENOUS ONCE
Status: DISCONTINUED | OUTPATIENT
Start: 2021-01-19 | End: 2021-01-19

## 2021-01-19 RX ORDER — FAMOTIDINE 20 MG/1
20 TABLET ORAL 2 TIMES DAILY PRN
Qty: 30 TABLET | Refills: 0 | Status: SHIPPED | OUTPATIENT
Start: 2021-01-19 | End: 2021-02-18

## 2021-01-19 RX ORDER — ONDANSETRON 2 MG/ML
4 INJECTION INTRAMUSCULAR; INTRAVENOUS ONCE
Status: DISCONTINUED | OUTPATIENT
Start: 2021-01-19 | End: 2021-01-19

## 2021-01-19 RX ORDER — MAGNESIUM HYDROXIDE/ALUMINUM HYDROXICE/SIMETHICONE 120; 1200; 1200 MG/30ML; MG/30ML; MG/30ML
30 SUSPENSION ORAL ONCE
Status: COMPLETED | OUTPATIENT
Start: 2021-01-19 | End: 2021-01-19

## 2021-01-19 RX ORDER — LIDOCAINE HYDROCHLORIDE 20 MG/ML
5 SOLUTION OROPHARYNGEAL ONCE
Status: COMPLETED | OUTPATIENT
Start: 2021-01-19 | End: 2021-01-19

## 2021-01-19 NOTE — ED NOTES
At bedside, undigested 2 inch in length piece of chicken noted in basin. Noted at bedside, frequency of coughing decreased. Pt notes mild discomfort to throat. Airway intact. md at bedside for re-evaluation.  Po medications ordered

## 2021-01-19 NOTE — ED NOTES
Pt provided and explained d/c instructions, at-home care, follow-up with gi, and rx. Pt in nad at this time. No iv access. Vss. Waterman. Ambulatory. A&ox3. Belongings with pt. All questions and concerns addressed.

## 2021-01-19 NOTE — ED PROVIDER NOTES
Patient Seen in: [facility] AND [facility] Emergency Department    History   Patient presents with:  FB in Throat  Cough/URI    Stated Complaint: Cough    HPI       Patient presents with sensation of esophageal foreign body. Patient was eating pork .   States it (MULTIVITAMIN ADULT OR),  Take by mouth. ibuprofen 200 MG Oral Tab,  Take 800 mg by mouth every 6 (six) hours as needed for Pain.        Family History   Problem Relation Age of Onset   • Other (Other) Mother    • Hypertension Maternal Grandmother    • Hy discussed the need to follow-up with GI will give referral.  She should return for pain, difficulty breathing, difficulty swallowing.   Recommended soft puréed diet                         Disposition and Plan     Clinical Impression:  Esophageal obstructio

## 2021-01-19 NOTE — H&P
[facility] - Gastroenterology                                                                                                               Reason for consult: e uncle had NHL  Maternal grandfather and maternal great grandfather has \"lazy epiglottis\"  She denies a FH GI malignancy, ibd celiac    No history of adverse reaction to sedation  No LANCE  No anticoagulants  No pacemaker/defibrillator  No pain medications as needed for cough. (Patient not taking: Reported on 1/8/2021 ) 30 capsule 0   • guaiFENesin-codeine (CHERATUSSIN AC) 100-10 MG/5ML Oral Solution Take 5 mL by mouth every evening.  (Patient not taking: Reported on 1/8/2021 ) 118 mL 0   • Diclofenac Sodium normal bowel sounds, soft, non-tender, non-distended no rebound or guarding, no masses, no hepatomegaly  MSK: No redness, no warmth, no swelling of joints  SKIN: No jaundice, no erythema, no rashes  HEMATOLOGIC: No bleeding, no bruising  NEURO: Alert and i decline  -labs  -egd w/ fist avail md  Dx: dysphagia, odynophagia, nausea, heartburn, ?eoe      Orders This Visit:  Orders Placed This Encounter      C-Reactive Protein      Sed Rate, Westergren (Automated)      Vitamin B12      TSH W Reflex To Free T4

## 2021-01-19 NOTE — ED INITIAL ASSESSMENT (HPI)
Pt arrived via [place] , per ems pt from home called for chocking, pt reports had dinner after 11pm, states she believes a piece of pork is stuck in her throat. Pt denies difficulty breathing, unlabored breathing in triage, uvula visualized.  Pt denies pm

## 2021-01-21 ENCOUNTER — TELEPHONE (OUTPATIENT)
Dept: GASTROENTEROLOGY | Facility: CLINIC | Age: 26
End: 2021-01-21

## 2021-01-21 ENCOUNTER — OFFICE VISIT (OUTPATIENT)
Dept: GASTROENTEROLOGY | Facility: CLINIC | Age: 26
End: 2021-01-21
Payer: COMMERCIAL

## 2021-01-21 VITALS
SYSTOLIC BLOOD PRESSURE: 158 MMHG | TEMPERATURE: 98 F | BODY MASS INDEX: 42.68 KG/M2 | HEIGHT: 64 IN | HEART RATE: 76 BPM | DIASTOLIC BLOOD PRESSURE: 89 MMHG | WEIGHT: 250 LBS

## 2021-01-21 DIAGNOSIS — R11.0 NAUSEA: ICD-10-CM

## 2021-01-21 DIAGNOSIS — R13.10 DYSPHAGIA, UNSPECIFIED TYPE: Primary | ICD-10-CM

## 2021-01-21 DIAGNOSIS — K20.0 EOSINOPHILIC ESOPHAGITIS: ICD-10-CM

## 2021-01-21 DIAGNOSIS — R12 HEARTBURN: ICD-10-CM

## 2021-01-21 DIAGNOSIS — R13.10 ODYNOPHAGIA: ICD-10-CM

## 2021-01-21 DIAGNOSIS — R19.7 DIARRHEA, UNSPECIFIED TYPE: ICD-10-CM

## 2021-01-21 PROCEDURE — 3079F DIAST BP 80-89 MM HG: CPT | Performed by: NURSE PRACTITIONER

## 2021-01-21 PROCEDURE — 3077F SYST BP >= 140 MM HG: CPT | Performed by: NURSE PRACTITIONER

## 2021-01-21 PROCEDURE — 99204 OFFICE O/P NEW MOD 45 MIN: CPT | Performed by: NURSE PRACTITIONER

## 2021-01-21 PROCEDURE — 3008F BODY MASS INDEX DOCD: CPT | Performed by: NURSE PRACTITIONER

## 2021-01-21 RX ORDER — ZINC SULFATE 50(220)MG
50 CAPSULE ORAL DAILY
COMMUNITY

## 2021-01-21 RX ORDER — PANTOPRAZOLE SODIUM 40 MG/1
40 TABLET, DELAYED RELEASE ORAL
Qty: 30 TABLET | Refills: 3 | Status: SHIPPED | OUTPATIENT
Start: 2021-01-21 | End: 2021-05-18 | Stop reason: ALTCHOICE

## 2021-01-21 NOTE — PATIENT INSTRUCTIONS
-start pantoprazole 40 mg once daily 30 min prior to first meal of the day  -pureed diet, extra care chewing, swallowing  -reflux diet modification  -report to er if condition decline  -labs  -egd w/ fist avail md  Dx: dysphagia, odynophagia, nausea, hea

## 2021-01-21 NOTE — TELEPHONE ENCOUNTER
Scheduled for:  EGD 69366  Provider Name:  Dr. Danyell Zelaya  Date:  01/27/2021  Location:  [facility]  Sedation:  MAC  Time:  1:00pm, (pt is aware [facility] will call with arrival time)    Prep:  NPO after midnight  Meds/Allergies Reconciled?:  Mari/NP reviewed.

## 2021-01-21 NOTE — TELEPHONE ENCOUNTER
ΣΑΡΑΝΤΙ from [facility] OF THE [facility] stating since patient tested positive for Covid on 12/07/2020 and is still experiencing a cough and shortness of breath , she needs to be scheduled at the hospital instead of [facility].   She is scheduled for an EGD on 01/27/2021 with  ?eoe

## 2021-01-22 NOTE — TELEPHONE ENCOUNTER
Dr Brendan Owens,    Do you just want this pt rescheduled at [facility] THE [facility] at a date that is further out or do you want her rescheduled at the hospital?

## 2021-01-22 NOTE — TELEPHONE ENCOUNTER
Mari's note says first available MD. So if the patient needs to be done at the hospital, then will have to find first available MD or clarify with Abi Steele when she returns Monday    Thanks    Jf Leonardo MD  [address] [facility] - G[facility]

## 2021-01-22 NOTE — TELEPHONE ENCOUNTER
Cancelled for:  EGD 07562  Provider Name:  Dr. Dede Domingo  Date:  01/27/2021  [facility]  Sedation:  MAC  Time:  1300   Prep:  NPO after midnight  Meds/Allergies Reconciled?:  Mari/NP reviewed.   Diagnosis with codes:  Dysphagia R13.10, odynophagia

## 2021-01-25 ENCOUNTER — LAB ENCOUNTER (OUTPATIENT)
Dept: LAB | Age: 26
End: 2021-01-25
Attending: NURSE PRACTITIONER
Payer: COMMERCIAL

## 2021-01-25 ENCOUNTER — TELEPHONE (OUTPATIENT)
Dept: GASTROENTEROLOGY | Facility: CLINIC | Age: 26
End: 2021-01-25

## 2021-01-25 DIAGNOSIS — R13.10 DYSPHAGIA, UNSPECIFIED TYPE: ICD-10-CM

## 2021-01-25 DIAGNOSIS — R19.7 DIARRHEA, UNSPECIFIED TYPE: ICD-10-CM

## 2021-01-25 DIAGNOSIS — R13.10 ODYNOPHAGIA: ICD-10-CM

## 2021-01-25 DIAGNOSIS — R11.0 NAUSEA: ICD-10-CM

## 2021-01-25 DIAGNOSIS — R19.7 DIARRHEA, UNSPECIFIED TYPE: Primary | ICD-10-CM

## 2021-01-25 DIAGNOSIS — R12 HEARTBURN: ICD-10-CM

## 2021-01-25 DIAGNOSIS — R79.82 ELEVATED C-REACTIVE PROTEIN (CRP): ICD-10-CM

## 2021-01-25 LAB
CRP SERPL-MCNC: 0.91 MG/DL (ref ?–0.3)
ERYTHROCYTE [SEDIMENTATION RATE] IN BLOOD: 9 MM/HR
IGA SERPL-MCNC: 158 MG/DL (ref 70–312)
TSI SER-ACNC: 2.37 MIU/ML (ref 0.36–3.74)
VIT B12 SERPL-MCNC: 1012 PG/ML (ref 193–986)

## 2021-01-25 PROCEDURE — 36415 COLL VENOUS BLD VENIPUNCTURE: CPT

## 2021-01-25 PROCEDURE — 82784 ASSAY IGA/IGD/IGG/IGM EACH: CPT

## 2021-01-25 PROCEDURE — 83516 IMMUNOASSAY NONANTIBODY: CPT

## 2021-01-25 PROCEDURE — 82607 VITAMIN B-12: CPT

## 2021-01-25 PROCEDURE — 84443 ASSAY THYROID STIM HORMONE: CPT

## 2021-01-25 PROCEDURE — 86140 C-REACTIVE PROTEIN: CPT

## 2021-01-25 PROCEDURE — 85652 RBC SED RATE AUTOMATED: CPT

## 2021-01-25 NOTE — TELEPHONE ENCOUNTER
Esr, tsh, iga unremarkable  b12 elevated and advised she stop supplementation if taking one.  ttg pending,  crp elevated and plan for calprotectin, c.diff. culture, giardia given report of diarrhea.   However think likely not infectious given reported chron

## 2021-01-25 NOTE — TELEPHONE ENCOUNTER
Dr. Salena Aldridge--    I spoke with [facility] which she okay'd to add on 2/5/21 at 1000 for the EGD on this patient. I know you have clinic that starts at 1100 but I thought I would ask. Please advise if your okay with this date and time.  Thank you

## 2021-01-26 LAB — TTG IGA SER-ACNC: 0.1 U/ML (ref ?–7)

## 2021-01-26 NOTE — TELEPHONE ENCOUNTER
Sorry, not a good time.     I spoke with Lenora Galdamez who recommended first available provider    Thanks    Ernesto Snow MD  [facility] - Gastroenterology  1/26/2021  1:11 PM

## 2021-01-26 NOTE — TELEPHONE ENCOUNTER
Dr Kaity Herzog--    This patient is trying to get in with a sooner date and Marlonny Alvarez stated that I can schedule her with any physician. Please advise if I can schedule her on 2/10/21 at 4558 Aristides Smith/RN said you can go over 15 min.  Thank you

## 2021-01-26 NOTE — TELEPHONE ENCOUNTER
I contacted [facility] to check if we can reschedule her there next week with Dr. Casandra Ann. They will CB once they do some research.     [facility] contacted me which they again stated that since she tested positive for COVID and is still coughing they only recommen

## 2021-01-27 NOTE — TELEPHONE ENCOUNTER
Scheduled for:  EGD w/poss Dil [id number]  Provider Name:  Dr. Lety Hill  Date:  2/10/21  Location:    [facility]  Sedation:  MAC  Time:  1956 (pt is aware to arrive at [phone])   Prep:  Nothing to eat after midnight   Nothing to drink after 1200 the day of the procedure  Me

## 2021-02-10 ENCOUNTER — HOSPITAL ENCOUNTER (OUTPATIENT)
Facility: HOSPITAL | Age: 26
Setting detail: HOSPITAL OUTPATIENT SURGERY
Discharge: HOME | End: 2021-02-10
Attending: INTERNAL MEDICINE | Admitting: INTERNAL MEDICINE
Payer: COMMERCIAL

## 2021-02-10 ENCOUNTER — ANESTHESIA EVENT (OUTPATIENT)
Dept: ENDOSCOPY | Facility: HOSPITAL | Age: 26
End: 2021-02-10
Payer: COMMERCIAL

## 2021-02-10 ENCOUNTER — ANESTHESIA (OUTPATIENT)
Dept: ENDOSCOPY | Facility: HOSPITAL | Age: 26
End: 2021-02-10
Payer: COMMERCIAL

## 2021-02-10 VITALS
OXYGEN SATURATION: 99 % | BODY MASS INDEX: 40.97 KG/M2 | DIASTOLIC BLOOD PRESSURE: 80 MMHG | RESPIRATION RATE: 16 BRPM | TEMPERATURE: 98 F | HEIGHT: 64 IN | SYSTOLIC BLOOD PRESSURE: 130 MMHG | HEART RATE: 64 BPM | WEIGHT: 240 LBS

## 2021-02-10 DIAGNOSIS — R11.0 NAUSEA: ICD-10-CM

## 2021-02-10 DIAGNOSIS — K20.0 EOSINOPHILIC ESOPHAGITIS: ICD-10-CM

## 2021-02-10 DIAGNOSIS — R19.7 DIARRHEA, UNSPECIFIED TYPE: ICD-10-CM

## 2021-02-10 DIAGNOSIS — R13.10 DYSPHAGIA, UNSPECIFIED TYPE: ICD-10-CM

## 2021-02-10 DIAGNOSIS — R12 HEARTBURN: ICD-10-CM

## 2021-02-10 DIAGNOSIS — R13.10 ODYNOPHAGIA: ICD-10-CM

## 2021-02-10 PROCEDURE — 43239 EGD BIOPSY SINGLE/MULTIPLE: CPT | Performed by: INTERNAL MEDICINE

## 2021-02-10 PROCEDURE — 43249 ESOPH EGD DILATION <30 MM: CPT | Performed by: INTERNAL MEDICINE

## 2021-02-10 PROCEDURE — 0DBM8ZX EXCISION OF DESCENDING COLON, VIA NATURAL OR ARTIFICIAL OPENING ENDOSCOPIC, DIAGNOSTIC: ICD-10-PCS | Performed by: INTERNAL MEDICINE

## 2021-02-10 PROCEDURE — 0DB38ZX EXCISION OF LOWER ESOPHAGUS, VIA NATURAL OR ARTIFICIAL OPENING ENDOSCOPIC, DIAGNOSTIC: ICD-10-PCS | Performed by: INTERNAL MEDICINE

## 2021-02-10 PROCEDURE — 0DBK8ZX EXCISION OF ASCENDING COLON, VIA NATURAL OR ARTIFICIAL OPENING ENDOSCOPIC, DIAGNOSTIC: ICD-10-PCS | Performed by: INTERNAL MEDICINE

## 2021-02-10 PROCEDURE — 0DB28ZX EXCISION OF MIDDLE ESOPHAGUS, VIA NATURAL OR ARTIFICIAL OPENING ENDOSCOPIC, DIAGNOSTIC: ICD-10-PCS | Performed by: INTERNAL MEDICINE

## 2021-02-10 PROCEDURE — 0D738ZZ DILATION OF LOWER ESOPHAGUS, VIA NATURAL OR ARTIFICIAL OPENING ENDOSCOPIC: ICD-10-PCS | Performed by: INTERNAL MEDICINE

## 2021-02-10 PROCEDURE — 0DB68ZX EXCISION OF STOMACH, VIA NATURAL OR ARTIFICIAL OPENING ENDOSCOPIC, DIAGNOSTIC: ICD-10-PCS | Performed by: INTERNAL MEDICINE

## 2021-02-10 PROCEDURE — 0DBL8ZX EXCISION OF TRANSVERSE COLON, VIA NATURAL OR ARTIFICIAL OPENING ENDOSCOPIC, DIAGNOSTIC: ICD-10-PCS | Performed by: INTERNAL MEDICINE

## 2021-02-10 RX ORDER — SODIUM CHLORIDE, SODIUM LACTATE, POTASSIUM CHLORIDE, CALCIUM CHLORIDE 600; 310; 30; 20 MG/100ML; MG/100ML; MG/100ML; MG/100ML
INJECTION, SOLUTION INTRAVENOUS CONTINUOUS
Status: DISCONTINUED | OUTPATIENT
Start: 2021-02-10 | End: 2021-02-10

## 2021-02-10 RX ORDER — LIDOCAINE HYDROCHLORIDE 10 MG/ML
INJECTION, SOLUTION EPIDURAL; INFILTRATION; INTRACAUDAL; PERINEURAL AS NEEDED
Status: DISCONTINUED | OUTPATIENT
Start: 2021-02-10 | End: 2021-02-10 | Stop reason: SURG

## 2021-02-10 RX ORDER — GLYCOPYRROLATE 0.2 MG/ML
INJECTION, SOLUTION INTRAMUSCULAR; INTRAVENOUS AS NEEDED
Status: DISCONTINUED | OUTPATIENT
Start: 2021-02-10 | End: 2021-02-10 | Stop reason: SURG

## 2021-02-10 RX ORDER — SODIUM CHLORIDE, SODIUM LACTATE, POTASSIUM CHLORIDE, CALCIUM CHLORIDE 600; 310; 30; 20 MG/100ML; MG/100ML; MG/100ML; MG/100ML
INJECTION, SOLUTION INTRAVENOUS CONTINUOUS PRN
Status: DISCONTINUED | OUTPATIENT
Start: 2021-02-10 | End: 2021-02-10 | Stop reason: SURG

## 2021-02-10 RX ADMIN — SODIUM CHLORIDE, SODIUM LACTATE, POTASSIUM CHLORIDE, CALCIUM CHLORIDE: 600; 310; 30; 20 INJECTION, SOLUTION INTRAVENOUS at 16:10:00

## 2021-02-10 RX ADMIN — GLYCOPYRROLATE 0.2 MG: 0.2 INJECTION, SOLUTION INTRAMUSCULAR; INTRAVENOUS at 16:14:00

## 2021-02-10 RX ADMIN — LIDOCAINE HYDROCHLORIDE 50 MG: 10 INJECTION, SOLUTION EPIDURAL; INFILTRATION; INTRACAUDAL; PERINEURAL at 16:14:00

## 2021-02-10 NOTE — ANESTHESIA PREPROCEDURE EVALUATION
Anesthesia PreOp Note    HPI:     Lizzy Antunez is a 30year old female who presents for preoperative consultation requested by: Alexandrea Aguilera MD    Date of Surgery: 2/10/2021    Procedure(s):  ESOPHAGOGASTRODUODENOSCOPY (EGD)  Indication: Dysphagia, not taking: Reported on 1/8/2021 ), Disp: 118 mL, Rfl: 0    •  Diclofenac Sodium 75 MG Oral Tab EC, TAKE ONE TABLET BY MOUTH TWICE DAILY AS NEEDED, Disp: 60 tablet, Rfl: 0    •  Drospirenone-Ethinyl Estradiol (GIANVI) 3-0.02 MG Oral Tab, Take 1 tablet by m Minutes per session: Not on file      Stress: Not on file    Relationships      Social connections        Talks on phone: Not on file        Gets together: Not on file        Attends Christianity service: Not on file        Active member of club or organizati MI, CAD, CABG/stent, dysrhythmias, angina, CHF    Rhythm: regular  Rate: normal    Neuro/Psych    (+)  neuromuscular disease, depression,     (-) seizures, TIA, CVA    GI/Hepatic/Renal    (+) GERD,   (-) liver disease, renal disease    Endo/Other    (-) di

## 2021-02-10 NOTE — OPERATIVE REPORT
[facility] Endoscopy Report      Preoperative Diagnosis:  - dysphagia/food impaction  - history of EoE      Postoperative Diagnosis:  - distal esophageal stricture s/p dilation to 15 mm TTS balloon  - furrowed esophagus   - small hiatal 2 c bulbar regions were normal.    Estimated blood loss-insignificant    Specimens-gastric biopsies, distal esophageal and mid esophageal biopsies    Impression:  - distal esophageal stricture s/p dilation to 15 mm TTS balloon  - furrowed esophagus   - small h

## 2021-02-10 NOTE — H&P
History & Physical Examination    Patient Name: Edgardo Escobar  MRN: [id number]  CSN: [id number]  YOB: 1995    Diagnosis:   Dysphagia  Prior dx of EoE ( at age 13 years )        •  Multiple Vitamins-Minerals (MULTIVITAMIN ADULT OR), Take by mo Esophageal reflux      Past Surgical History:   Procedure Laterality Date   • CHOLECYSTECTOMY  12/1/15   • OTHER      left ankle   • REMOVAL GALLBLADDER       Family History   Problem Relation Age of Onset   • Other (Other) Mother    • Hypertension Materna

## 2021-02-10 NOTE — ANESTHESIA POSTPROCEDURE EVALUATION
Patient: Brook Morrison    Procedure Summary     Date: 02/10/21 Room / Location: [facility] ENDOSCOPY 05 / [facility] ENDOSCOPY    Anesthesia Start: 9369 Anesthesia Stop: 3260    Procedure: ESOPHAGOGASTRODUODENOSCOPY (EGD) (N/A ) Diagnosis:       Dysphagia, unspecified

## 2021-02-11 ENCOUNTER — TELEPHONE (OUTPATIENT)
Dept: GASTROENTEROLOGY | Facility: CLINIC | Age: 26
End: 2021-02-11

## 2021-02-11 DIAGNOSIS — K20.0 EOSINOPHILIC ESOPHAGITIS: Primary | ICD-10-CM

## 2021-02-11 NOTE — TELEPHONE ENCOUNTER
Contacted pt. Verified . EMERITA Leon message below. Pt verbalized understanding but also asked RN to send message via [employer] regarding Dr. Jabier Dallas (allergist) number and when she is suppose to F/U with our office.      MyChart message

## 2021-02-11 NOTE — TELEPHONE ENCOUNTER
Nursing:  Dr. Dana Nickerson may have sent this already,  But if not,   EGD results show eosinophilic esophagitis - stay on pantoprazole and will have office contact you to set up evaluation by Allergy.      I placed referral to Dr. Nya Roy and patient may call allerg

## 2021-02-12 ENCOUNTER — TELEPHONE (OUTPATIENT)
Dept: GASTROENTEROLOGY | Facility: CLINIC | Age: 26
End: 2021-02-12

## 2021-02-12 NOTE — TELEPHONE ENCOUNTER
Jaelyn and or RN to contact pt  - has she been on budesonide topical therapy before for the EoE  Please also arrange follow up with Allergy/Dr. Lavonne Webb. Continue on PPI daily    Follow up with Jaelyn in 3 months to monitor response to above.      Dr. Michelle Rosado

## 2021-02-12 NOTE — TELEPHONE ENCOUNTER
Dr. Dmitry Barrientos    I reviewed below recommendation with patient. She reports she has been off of budesonide since she was 12. She has appointment with Dr. Jimmie Venegas at [facility] on 3/2/2021. She tried soft foods.   Fish and soup went down ok, but pasta was difficult

## 2021-02-12 NOTE — TELEPHONE ENCOUNTER
Left message for patient to call back. Diya RN sent patient MyChart message today with information as well.

## 2021-02-15 NOTE — TELEPHONE ENCOUNTER
Recommendations as below are noted and appreciated. Of note, this is Mari's patient from office evaluation last month.

## 2021-02-17 ENCOUNTER — TELEPHONE (OUTPATIENT)
Dept: GASTROENTEROLOGY | Facility: CLINIC | Age: 26
End: 2021-02-17

## 2021-02-17 NOTE — TELEPHONE ENCOUNTER
Contacted pt. She has been taking her pantoprazole 40 mg daily. Missed a few days with weather, because was not able to get refill. Has consult with Dr. Tesha Carrasquillo. She has had dysphagia with hard solids.    Case discussed with attending GI and she had sunny

## 2021-03-02 ENCOUNTER — NURSE ONLY (OUTPATIENT)
Dept: ALLERGY | Facility: CLINIC | Age: 26
End: 2021-03-02
Payer: COMMERCIAL

## 2021-03-02 ENCOUNTER — OFFICE VISIT (OUTPATIENT)
Dept: ALLERGY | Facility: CLINIC | Age: 26
End: 2021-03-02
Payer: COMMERCIAL

## 2021-03-02 VITALS
TEMPERATURE: 98 F | RESPIRATION RATE: 16 BRPM | SYSTOLIC BLOOD PRESSURE: 162 MMHG | DIASTOLIC BLOOD PRESSURE: 94 MMHG | OXYGEN SATURATION: 99 % | HEART RATE: 84 BPM

## 2021-03-02 DIAGNOSIS — Z91.09 ENVIRONMENTAL ALLERGIES: ICD-10-CM

## 2021-03-02 DIAGNOSIS — K20.0 EOSINOPHILIC ESOPHAGITIS: Primary | ICD-10-CM

## 2021-03-02 DIAGNOSIS — K20.0 EOSINOPHILIC ESOPHAGITIS: ICD-10-CM

## 2021-03-02 DIAGNOSIS — Z91.038 HYMENOPTERA ALLERGY: ICD-10-CM

## 2021-03-02 DIAGNOSIS — Z91.018 FOOD ALLERGY: ICD-10-CM

## 2021-03-02 PROCEDURE — 99244 OFF/OP CNSLTJ NEW/EST MOD 40: CPT | Performed by: ALLERGY & IMMUNOLOGY

## 2021-03-02 PROCEDURE — 3077F SYST BP >= 140 MM HG: CPT | Performed by: ALLERGY & IMMUNOLOGY

## 2021-03-02 PROCEDURE — 3080F DIAST BP >= 90 MM HG: CPT | Performed by: ALLERGY & IMMUNOLOGY

## 2021-03-02 PROCEDURE — 95024 IQ TESTS W/ALLERGENIC XTRCS: CPT | Performed by: ALLERGY & IMMUNOLOGY

## 2021-03-02 PROCEDURE — 95004 PERQ TESTS W/ALRGNC XTRCS: CPT | Performed by: ALLERGY & IMMUNOLOGY

## 2021-03-02 RX ORDER — FLUTICASONE PROPIONATE 50 MCG
2 SPRAY, SUSPENSION (ML) NASAL DAILY
Qty: 1 BOTTLE | Refills: 0 | Status: SHIPPED | OUTPATIENT
Start: 2021-03-02

## 2021-03-02 RX ORDER — EPINEPHRINE 0.3 MG/.3ML
INJECTION SUBCUTANEOUS
Qty: 1 EACH | Refills: 0 | Status: SHIPPED | OUTPATIENT
Start: 2021-03-02 | End: 2021-04-13

## 2021-03-02 RX ORDER — LEVOCETIRIZINE DIHYDROCHLORIDE 5 MG/1
5 TABLET, FILM COATED ORAL NIGHTLY
Qty: 30 TABLET | Refills: 0 | Status: SHIPPED | OUTPATIENT
Start: 2021-03-02 | End: 2021-03-29

## 2021-03-02 NOTE — PROGRESS NOTES
Bandar Turk is a 30year old female. HPI:   Patient presents with:  New Patient: Referred by ENT for EOE. She also has gastritis. Would like to explore possible food allergy.      Patient is a 17-year-old female who presents for allergy consultation mucosa with mild chronic gastritis and reactive foveolar changes, suggestive of chemical/reactive gastropathy  · Diff-quik stain (with reactive positive control) is negative for Helicobacter pylori-like organisms     B.  Distal esophagus; biopsy:  · [place] Medications (Active prior to today's visit):  Current Outpatient Medications   Medication Sig Dispense Refill   • Levocetirizine Dihydrochloride (XYZAL) 5 MG Oral Tab Take 1 tablet (5 mg total) by mouth nightly.  30 tablet 0   • Fluticasone Propionate ( responsive, no acute distress noted  Head/Face: NC/Atraumatic  Eyes/Vision: conjunctiva and lids are normal extraocular motion is intact   Ears/Audiometry: tympanic membranes are normal bilaterally hearing is grossly intact  Nose/Mouth/Throat: nose and thr weeks  Follow-up with GI as scheduled in approximately 4 weeks      2.  AR  Handouts on allergies and avoidance measures provided and reviewed including the potential treatment option of immunotherapy  Consider Xyzal, levocetirizine 5 mg once a night at bed

## 2021-03-02 NOTE — PATIENT INSTRUCTIONS
1. EoE  Handouts on eosinophilic esophagitis provided and reviewed, including the mainstays of treatment being treatment of underlying GERD, anti-GERD measures, dietary modification, avoidance of food allergens, and swallowed  Flovent   Reviewed the goals

## 2021-03-04 ENCOUNTER — TELEPHONE (OUTPATIENT)
Dept: GASTROENTEROLOGY | Facility: CLINIC | Age: 26
End: 2021-03-04

## 2021-03-04 NOTE — TELEPHONE ENCOUNTER
Overdue reminder letter mailed out to patient.      Labs:   CALPROTECTIN, FECAL   CDIFFICILE TOXIGENIC PCR (OPT)   GIARDIA + CRYPTO ANTIGEN, STOOL   STOOL CULTURE W/SHIGATOXIN

## 2021-03-11 NOTE — TELEPHONE ENCOUNTER
Phone Room-    When patient calls back, please assist with scheduling OV follow up appointment with either Dr. Justen Kiser or TIFFANY Hanley (re: evaluation response on PPI's)  within the next month. Thank you!        RN WALE to assist patient to kodi

## 2021-03-29 RX ORDER — LEVOCETIRIZINE DIHYDROCHLORIDE 5 MG/1
5 TABLET, FILM COATED ORAL NIGHTLY
Qty: 30 TABLET | Refills: 0 | Status: SHIPPED | OUTPATIENT
Start: 2021-03-29 | End: 2021-04-15

## 2021-03-29 NOTE — TELEPHONE ENCOUNTER
Message left on patient's voicemail informing her that Dr. Keara Huerta did refill a 1 month supply of Xyzal.     Patient informed on message that she will need to be seen in Allergy for any further refills.  Patient asked to call the Allergy Department to robert

## 2021-03-29 NOTE — TELEPHONE ENCOUNTER
Patient last seen in Allergy 3/2/2021 for . . .    Eosinophilic esophagitis  (primary encounter diagnosis)  Food allergy  Environmental allergies  Hymenoptera allergy    Refill request received for .  . .    Levocetirizine Dihydrochloride (XYZAL) 5 MG Oral

## 2021-03-30 ENCOUNTER — HOSPITAL ENCOUNTER (EMERGENCY)
Facility: HOSPITAL | Age: 26
Discharge: HOME | End: 2021-03-31
Attending: EMERGENCY MEDICINE
Payer: COMMERCIAL

## 2021-03-30 DIAGNOSIS — R42 LIGHTHEADEDNESS: ICD-10-CM

## 2021-03-30 DIAGNOSIS — R53.83 FATIGUE, UNSPECIFIED TYPE: Primary | ICD-10-CM

## 2021-03-30 PROCEDURE — 93010 ELECTROCARDIOGRAM REPORT: CPT | Performed by: EMERGENCY MEDICINE

## 2021-03-30 PROCEDURE — 96374 THER/PROPH/DIAG INJ IV PUSH: CPT

## 2021-03-30 PROCEDURE — 82962 GLUCOSE BLOOD TEST: CPT

## 2021-03-30 PROCEDURE — 93005 ELECTROCARDIOGRAM TRACING: CPT

## 2021-03-30 PROCEDURE — 99284 EMERGENCY DEPT VISIT MOD MDM: CPT

## 2021-03-31 VITALS
WEIGHT: 231 LBS | DIASTOLIC BLOOD PRESSURE: 74 MMHG | HEART RATE: 63 BPM | RESPIRATION RATE: 19 BRPM | OXYGEN SATURATION: 93 % | HEIGHT: 64 IN | TEMPERATURE: 98 F | SYSTOLIC BLOOD PRESSURE: 101 MMHG | BODY MASS INDEX: 39.44 KG/M2

## 2021-03-31 PROCEDURE — 80048 BASIC METABOLIC PNL TOTAL CA: CPT | Performed by: EMERGENCY MEDICINE

## 2021-03-31 PROCEDURE — 85025 COMPLETE CBC W/AUTO DIFF WBC: CPT | Performed by: EMERGENCY MEDICINE

## 2021-03-31 PROCEDURE — 84443 ASSAY THYROID STIM HORMONE: CPT | Performed by: EMERGENCY MEDICINE

## 2021-03-31 RX ORDER — DIAZEPAM 5 MG/ML
2.5 INJECTION, SOLUTION INTRAMUSCULAR; INTRAVENOUS ONCE
Status: COMPLETED | OUTPATIENT
Start: 2021-03-31 | End: 2021-03-31

## 2021-03-31 NOTE — ED PROVIDER NOTES
Patient Seen in: [facility] Emergency Department    History   Patient presents with:  Dizziness      HPI    The patient presents to the ED complaining of feeling lightheaded and dizzy ever since having Covid in January.   She states that symptoms h Transportation (Medical):       Lack of Transportation (Non-Medical):   Physical Activity:       Days of Exercise per Week:       Minutes of Exercise per Session:   Stress:       Feeling of Stress :   Social Connections:       Frequency of Communication wi deviation. Cardiovascular:      Rate and Rhythm: Normal rate and regular rhythm. Heart sounds: No murmur heard. Pulmonary:      Effort: Pulmonary effort is normal. No respiratory distress. Breath sounds: No stridor.    Abdominal:      Genera 93%, Room air, Normal     Monitor Interpretation:   normal sinus rhythm, sinus bradycardia    Differential Diagnosis/ Diagnostic Considerations: Fatigue, dehydration, long-haul or syndrome, anxiety    Medical Record Review: I personally reviewed available

## 2021-03-31 NOTE — ED QUICK NOTES
Pt A&Ox3, reg resp. Pt reports dizziness x5 days, denies n/v/d. Pt denies pmhx. Pt aware of plan of care, call light within reach.

## 2021-03-31 NOTE — ED INITIAL ASSESSMENT (HPI)
Pt c/o lightheadedness/dizziness for the past 5 days that worsened today, reports left-sided CP tonight that worsens with breathing. Pt is awake and alert, respirations nonlabored, is speaking in full sentences.

## 2021-04-01 ENCOUNTER — HOSPITAL ENCOUNTER (OUTPATIENT)
Age: 26
Discharge: HOME | End: 2021-04-01
Attending: EMERGENCY MEDICINE
Payer: COMMERCIAL

## 2021-04-01 VITALS
SYSTOLIC BLOOD PRESSURE: 136 MMHG | TEMPERATURE: 98 F | OXYGEN SATURATION: 100 % | HEART RATE: 77 BPM | RESPIRATION RATE: 20 BRPM | DIASTOLIC BLOOD PRESSURE: 88 MMHG

## 2021-04-01 DIAGNOSIS — R53.81 MALAISE: ICD-10-CM

## 2021-04-01 DIAGNOSIS — R42 DIZZINESS: Primary | ICD-10-CM

## 2021-04-01 DIAGNOSIS — J02.0 STREP PHARYNGITIS: ICD-10-CM

## 2021-04-01 PROCEDURE — 87880 STREP A ASSAY W/OPTIC: CPT

## 2021-04-01 PROCEDURE — 86308 HETEROPHILE ANTIBODY SCREEN: CPT | Performed by: EMERGENCY MEDICINE

## 2021-04-01 PROCEDURE — 81025 URINE PREGNANCY TEST: CPT

## 2021-04-01 PROCEDURE — 99214 OFFICE O/P EST MOD 30 MIN: CPT

## 2021-04-01 PROCEDURE — 96374 THER/PROPH/DIAG INJ IV PUSH: CPT

## 2021-04-01 PROCEDURE — 96361 HYDRATE IV INFUSION ADD-ON: CPT

## 2021-04-01 PROCEDURE — 96375 TX/PRO/DX INJ NEW DRUG ADDON: CPT

## 2021-04-01 PROCEDURE — 82962 GLUCOSE BLOOD TEST: CPT

## 2021-04-01 RX ORDER — ONDANSETRON 2 MG/ML
4 INJECTION INTRAMUSCULAR; INTRAVENOUS ONCE
Status: COMPLETED | OUTPATIENT
Start: 2021-04-01 | End: 2021-04-01

## 2021-04-01 RX ORDER — ONDANSETRON 4 MG/1
4 TABLET, ORALLY DISINTEGRATING ORAL EVERY 8 HOURS PRN
Qty: 30 TABLET | Refills: 0 | Status: SHIPPED | OUTPATIENT
Start: 2021-04-01 | End: 2021-04-13

## 2021-04-01 RX ORDER — SODIUM CHLORIDE 9 MG/ML
1000 INJECTION, SOLUTION INTRAVENOUS ONCE
Status: COMPLETED | OUTPATIENT
Start: 2021-04-01 | End: 2021-04-01

## 2021-04-01 RX ORDER — AZITHROMYCIN 500 MG/1
500 TABLET, FILM COATED ORAL DAILY
Qty: 5 TABLET | Refills: 0 | Status: SHIPPED | OUTPATIENT
Start: 2021-04-01 | End: 2021-04-06

## 2021-04-01 RX ORDER — KETOROLAC TROMETHAMINE 30 MG/ML
30 INJECTION, SOLUTION INTRAMUSCULAR; INTRAVENOUS ONCE
Status: COMPLETED | OUTPATIENT
Start: 2021-04-01 | End: 2021-04-01

## 2021-04-01 RX ORDER — DIPHENHYDRAMINE HYDROCHLORIDE 50 MG/ML
25 INJECTION INTRAMUSCULAR; INTRAVENOUS ONCE
Status: COMPLETED | OUTPATIENT
Start: 2021-04-01 | End: 2021-04-01

## 2021-04-01 NOTE — ED INITIAL ASSESSMENT (HPI)
Dizziness and feeling lightheaded,was seen in the ED 3/30 for same, has had covid in December, states still with loss of smell and taste and \" choking \" episodes

## 2021-04-02 NOTE — ED PROVIDER NOTES
Patient Seen in: [facility]      History   Patient presents with:  Dizziness    Stated Complaint: dizzy and weak    HPI/Subjective:   HPI    30year old female with h/o depression, GERD, gastritis, esophageal stricture, and h/o COVID-19 infec Current:/88   Pulse 77   Temp 98.1 °F (36.7 °C) (Temporal)   Resp 20   LMP 01/15/2021 (Within Months)   SpO2 100%         Physical Exam  Vitals and nursing note reviewed. Constitutional:       General: She is not in acute distress.      Appear for the following components:       Result Value    POCT Rapid Strep Positive (*)     All other components within normal limits   POCT MONO TEST - Normal   POCT GLUCOSE - Normal   EMH POCT PREGNANCY URINE - Normal            MDM     Pulse Ox: 100%, Normal,

## 2021-04-05 NOTE — PROGRESS NOTES
[facility] - Gastroenterology                                                                                                               Reason for consult: f/u    Requesting physician or provider: None Pcp    Madina bedtime to eval for eval of IBS type sx  Mom says patient had EoE at that time       Wt Readings from Last 6 Encounters:  04/07/21 : 229 lb 6.4 oz (104.1 kg)  03/30/21 : 231 lb (104.8 kg)  02/10/21 : 240 lb (108.9 kg)  01/21/21 : 250 lb (113.4 kg)  01/19/2  MCG/ACT Inhalation Aerosol Two sprays orally, TWICE a day. Please spray to back of throat and then SWALLOW (do not inhale). Do not eat/drink for 60 min after taking. 1 Inhaler 2   • zinc sulfate 220 (50 Zn) MG Oral Cap Take 50 mg by mouth daily. gait    Labs/Imaging/Procedures:     EGD 2/10/21:  Impression:  - distal esophageal stricture s/p dilation to 15 mm TTS balloon  - furrowed esophagus   - small hiatal 2 cm  - gastritis      Recommendations:  - Post procedure /esophageal dilation instructio dilatation  -increase pantoprazole 40 mg twice daily  -fluticasone and if not tolerated contact office for alternate therapy  -soft diet, care with chewing avoid hard solids  -report to er if sx food impaction    #constipation  Constipation w/o red flags o

## 2021-04-07 ENCOUNTER — LAB ENCOUNTER (OUTPATIENT)
Dept: LAB | Age: 26
End: 2021-04-07
Attending: ALLERGY & IMMUNOLOGY
Payer: COMMERCIAL

## 2021-04-07 ENCOUNTER — OFFICE VISIT (OUTPATIENT)
Dept: GASTROENTEROLOGY | Facility: CLINIC | Age: 26
End: 2021-04-07
Payer: COMMERCIAL

## 2021-04-07 ENCOUNTER — TELEPHONE (OUTPATIENT)
Dept: GASTROENTEROLOGY | Facility: CLINIC | Age: 26
End: 2021-04-07

## 2021-04-07 VITALS
DIASTOLIC BLOOD PRESSURE: 80 MMHG | BODY MASS INDEX: 39.16 KG/M2 | TEMPERATURE: 98 F | SYSTOLIC BLOOD PRESSURE: 114 MMHG | WEIGHT: 229.38 LBS | HEIGHT: 64 IN | HEART RATE: 79 BPM

## 2021-04-07 DIAGNOSIS — K20.0 EOSINOPHILIC ESOPHAGITIS: Primary | ICD-10-CM

## 2021-04-07 DIAGNOSIS — K59.00 CONSTIPATION, UNSPECIFIED CONSTIPATION TYPE: ICD-10-CM

## 2021-04-07 DIAGNOSIS — Z91.038 HYMENOPTERA ALLERGY: ICD-10-CM

## 2021-04-07 PROCEDURE — 86003 ALLG SPEC IGE CRUDE XTRC EA: CPT

## 2021-04-07 PROCEDURE — 36415 COLL VENOUS BLD VENIPUNCTURE: CPT

## 2021-04-07 PROCEDURE — 3074F SYST BP LT 130 MM HG: CPT | Performed by: NURSE PRACTITIONER

## 2021-04-07 PROCEDURE — 99214 OFFICE O/P EST MOD 30 MIN: CPT | Performed by: NURSE PRACTITIONER

## 2021-04-07 PROCEDURE — 3008F BODY MASS INDEX DOCD: CPT | Performed by: NURSE PRACTITIONER

## 2021-04-07 PROCEDURE — 3079F DIAST BP 80-89 MM HG: CPT | Performed by: NURSE PRACTITIONER

## 2021-04-07 RX ORDER — PANTOPRAZOLE SODIUM 40 MG/1
40 TABLET, DELAYED RELEASE ORAL 2 TIMES DAILY
Qty: 60 TABLET | Refills: 3 | Status: SHIPPED | OUTPATIENT
Start: 2021-04-07 | End: 2021-04-13

## 2021-04-07 NOTE — PATIENT INSTRUCTIONS
-increase pantoprazole 40 mg twice daily  -fluticasone and if not tolerated contact office for alternate therapy  -soft diet, care with chewing avoid hard solids  -report to er if sx food impaction  -miralax once daily      -egd w/ harpreet w/ brendon w/ Dr. Christine Gamma

## 2021-04-07 NOTE — TELEPHONE ENCOUNTER
Dr. Geneva Smith saw this patient in office today 4/7/21 and she said she needs a EGD w./ DIL soon patient Dx EOE. You did her previous procedure. Can I add her on to May 3rd at [address] [place] [address] when your on-call?   Please advise

## 2021-04-07 NOTE — TELEPHONE ENCOUNTER
Scheduled for:  EGD 86457  Provider Name:  Dr. Job Lockwood  Date:  5/3/21  Location:  [facility]  Sedation:  MAC  Time:  10:15am (pt is aware to arrive at 9:15am) prep instructions sent via [employer]  Prep:  NPO after midnight  Meds/Allergies Reconciled?:  Mari/IVON irvin

## 2021-04-09 ENCOUNTER — TELEPHONE (OUTPATIENT)
Dept: GASTROENTEROLOGY | Facility: CLINIC | Age: 26
End: 2021-04-09

## 2021-04-09 NOTE — TELEPHONE ENCOUNTER
I spoke to the pt and she did receive Kathy's message.      She was able to read her instructions on MyChart

## 2021-04-09 NOTE — TELEPHONE ENCOUNTER
JOSSE 4/9/21 SB    Left message with patient regarding her EGD on 5/3/21 arrival time of 9:15am @ [facility]. I also informed her that I was sending the prep instructions via [employer] and via mail.  I did also ask her to call back when she got the message so we

## 2021-04-13 ENCOUNTER — HOSPITAL ENCOUNTER (OUTPATIENT)
Dept: GENERAL RADIOLOGY | Age: 26
Discharge: HOME | End: 2021-04-13
Attending: FAMILY MEDICINE
Payer: COMMERCIAL

## 2021-04-13 ENCOUNTER — LAB ENCOUNTER (OUTPATIENT)
Dept: LAB | Age: 26
End: 2021-04-13
Attending: FAMILY MEDICINE
Payer: COMMERCIAL

## 2021-04-13 ENCOUNTER — OFFICE VISIT (OUTPATIENT)
Dept: FAMILY MEDICINE CLINIC | Facility: CLINIC | Age: 26
End: 2021-04-13
Payer: COMMERCIAL

## 2021-04-13 VITALS
WEIGHT: 229 LBS | DIASTOLIC BLOOD PRESSURE: 85 MMHG | HEART RATE: 78 BPM | BODY MASS INDEX: 39.09 KG/M2 | SYSTOLIC BLOOD PRESSURE: 129 MMHG | HEIGHT: 64 IN

## 2021-04-13 DIAGNOSIS — E16.2 HYPOGLYCEMIA: ICD-10-CM

## 2021-04-13 DIAGNOSIS — Z86.16 HISTORY OF COVID-19: ICD-10-CM

## 2021-04-13 DIAGNOSIS — R42 EPISODIC LIGHTHEADEDNESS: Primary | ICD-10-CM

## 2021-04-13 PROCEDURE — 36415 COLL VENOUS BLD VENIPUNCTURE: CPT

## 2021-04-13 PROCEDURE — 3079F DIAST BP 80-89 MM HG: CPT | Performed by: FAMILY MEDICINE

## 2021-04-13 PROCEDURE — 71046 X-RAY EXAM CHEST 2 VIEWS: CPT | Performed by: FAMILY MEDICINE

## 2021-04-13 PROCEDURE — 3008F BODY MASS INDEX DOCD: CPT | Performed by: FAMILY MEDICINE

## 2021-04-13 PROCEDURE — 99204 OFFICE O/P NEW MOD 45 MIN: CPT | Performed by: FAMILY MEDICINE

## 2021-04-13 PROCEDURE — 83525 ASSAY OF INSULIN: CPT

## 2021-04-13 PROCEDURE — 3074F SYST BP LT 130 MM HG: CPT | Performed by: FAMILY MEDICINE

## 2021-04-13 PROCEDURE — 83036 HEMOGLOBIN GLYCOSYLATED A1C: CPT

## 2021-04-13 NOTE — PROGRESS NOTES
Neetu Molina is a 30year old female.   HPI:   Here for noted episodes of lightheadedness that are happening frequently about every day, associated with increasing blood pressure and chest tightness, she has history of eosinophilic esophagitis that has be use: Yes      Comment: occasional    Drug use: No       REVIEW OF SYSTEMS:   Review of Systems   Neurological: Positive for light-headedness.           EXAM:   /85 (BP Location: Left arm, Patient Position: Sitting, Cuff Size: large)   Pulse 78   Ht 5' technology. Please excuse any typos.

## 2021-04-15 ENCOUNTER — NURSE ONLY (OUTPATIENT)
Dept: ALLERGY | Facility: CLINIC | Age: 26
End: 2021-04-15
Payer: COMMERCIAL

## 2021-04-15 ENCOUNTER — OFFICE VISIT (OUTPATIENT)
Dept: ALLERGY | Facility: CLINIC | Age: 26
End: 2021-04-15
Payer: COMMERCIAL

## 2021-04-15 VITALS
BODY MASS INDEX: 39.09 KG/M2 | WEIGHT: 229 LBS | HEIGHT: 64 IN | HEART RATE: 80 BPM | DIASTOLIC BLOOD PRESSURE: 83 MMHG | SYSTOLIC BLOOD PRESSURE: 122 MMHG

## 2021-04-15 DIAGNOSIS — Z91.018 FOOD ALLERGY: ICD-10-CM

## 2021-04-15 DIAGNOSIS — K20.0 EOSINOPHILIC ESOPHAGITIS: ICD-10-CM

## 2021-04-15 DIAGNOSIS — K20.0 EOSINOPHILIC ESOPHAGITIS: Primary | ICD-10-CM

## 2021-04-15 DIAGNOSIS — Z91.038 HYMENOPTERA ALLERGY: ICD-10-CM

## 2021-04-15 DIAGNOSIS — Z91.09 ENVIRONMENTAL ALLERGIES: ICD-10-CM

## 2021-04-15 PROCEDURE — 95004 PERQ TESTS W/ALRGNC XTRCS: CPT | Performed by: ALLERGY & IMMUNOLOGY

## 2021-04-15 PROCEDURE — 3008F BODY MASS INDEX DOCD: CPT | Performed by: ALLERGY & IMMUNOLOGY

## 2021-04-15 PROCEDURE — 99214 OFFICE O/P EST MOD 30 MIN: CPT | Performed by: ALLERGY & IMMUNOLOGY

## 2021-04-15 PROCEDURE — 3079F DIAST BP 80-89 MM HG: CPT | Performed by: ALLERGY & IMMUNOLOGY

## 2021-04-15 PROCEDURE — 3074F SYST BP LT 130 MM HG: CPT | Performed by: ALLERGY & IMMUNOLOGY

## 2021-04-15 RX ORDER — LEVOCETIRIZINE DIHYDROCHLORIDE 5 MG/1
5 TABLET, FILM COATED ORAL NIGHTLY
Qty: 90 TABLET | Refills: 0 | Status: SHIPPED | OUTPATIENT
Start: 2021-04-15

## 2021-04-15 NOTE — PROGRESS NOTES
Brook Morrison is a 30year old female. HPI:   Patient presents with: Allergies: went to e/r for choking at beginning of month. scope scheduled     Patient is a 24-year-old female who presents for follow-up with a chief complaint of allergies.      Madina Hypertension Maternal Grandfather       Social History: Social History    Tobacco Use      Smoking status: Never Smoker      Smokeless tobacco: Never Used      Tobacco comment: Mom smoked when she was growing up.       Vaping Use      Vaping Use: Never used NC/Atraumatic  Eyes/Vision: conjunctiva and lids are normal extraocular motion is intact   Ears/Audiometry: tympanic membranes are normal bilaterally hearing is grossly intact  Nose/Mouth/Throat: nose and throat are clear mucous membranes are moist   Neck/ esophagitis  Reviewed anti-GERD measures      2. Ar  Reviewed allergy avoidance measures and potential treatment option of immunotherapy  Continue with Xyzal and Flonase  Consider Singulair if refractory    3.   History of hymenoptera reaction  Reviewed neg

## 2021-04-15 NOTE — PATIENT INSTRUCTIONS
1. EoE  Handouts on eosinophilic esophagitis provided and reviewed. Reviewed common treatment modalities. Continue with proton pump inhibitor for underlying GERD.   As patient did not tolerate prior swallowed Flovent may potentially consider trial of swal

## 2021-04-16 ENCOUNTER — LABORATORY ENCOUNTER (OUTPATIENT)
Dept: LAB | Age: 26
End: 2021-04-16
Attending: FAMILY MEDICINE
Payer: COMMERCIAL

## 2021-04-16 DIAGNOSIS — E16.2 HYPOGLYCEMIA: ICD-10-CM

## 2021-04-16 DIAGNOSIS — Z91.09 ENVIRONMENTAL ALLERGIES: ICD-10-CM

## 2021-04-16 PROCEDURE — 86003 ALLG SPEC IGE CRUDE XTRC EA: CPT

## 2021-04-16 PROCEDURE — 82951 GLUCOSE TOLERANCE TEST (GTT): CPT

## 2021-04-16 PROCEDURE — 36415 COLL VENOUS BLD VENIPUNCTURE: CPT

## 2021-04-19 ENCOUNTER — TELEPHONE (OUTPATIENT)
Dept: ALLERGY | Facility: CLINIC | Age: 26
End: 2021-04-19

## 2021-04-19 NOTE — TELEPHONE ENCOUNTER
Left message for patient to contact our office regarding test results. See Dr. Rolanda Holbrook message below.

## 2021-04-19 NOTE — TELEPHONE ENCOUNTER
----- Message from Jose Miguel Champion MD sent at 4/19/2021  2:48 PM CDT -----  Please call patient with recent serum IgE testing to select allergens.   Patient did show IgE production to dust mites and negative to cat

## 2021-04-20 NOTE — TELEPHONE ENCOUNTER
Pt contacted, confirmed name, and . Pt verbalizes understanding, and denies further questions. Patient questioned if she was positive to cat via scratch test, but negative to cat via blood work, which one would be correct?     Pt reports she gets tig

## 2021-04-20 NOTE — TELEPHONE ENCOUNTER
I would be more prone to trust the scratch testing if patient is having clinical symptoms with cats.

## 2021-04-21 NOTE — TELEPHONE ENCOUNTER
Patient calling back. RN went over Dr. Hung Lopez recommendations--she is aware to consider herself allergic to cats given her positive skin test and clinical symptoms. RN advised that she avoid cats at this time.

## 2021-05-01 ENCOUNTER — LAB ENCOUNTER (OUTPATIENT)
Dept: LAB | Age: 26
End: 2021-05-01
Attending: INTERNAL MEDICINE
Payer: COMMERCIAL

## 2021-05-01 DIAGNOSIS — Z01.818 PRE-OP TESTING: ICD-10-CM

## 2021-05-03 ENCOUNTER — TELEPHONE (OUTPATIENT)
Dept: GASTROENTEROLOGY | Facility: CLINIC | Age: 26
End: 2021-05-03

## 2021-05-03 ENCOUNTER — HOSPITAL ENCOUNTER (OUTPATIENT)
Facility: HOSPITAL | Age: 26
Setting detail: HOSPITAL OUTPATIENT SURGERY
Discharge: HOME | End: 2021-05-03
Attending: INTERNAL MEDICINE | Admitting: INTERNAL MEDICINE
Payer: COMMERCIAL

## 2021-05-03 ENCOUNTER — ANESTHESIA EVENT (OUTPATIENT)
Dept: ENDOSCOPY | Facility: HOSPITAL | Age: 26
End: 2021-05-03
Payer: COMMERCIAL

## 2021-05-03 ENCOUNTER — ANESTHESIA (OUTPATIENT)
Dept: ENDOSCOPY | Facility: HOSPITAL | Age: 26
End: 2021-05-03
Payer: COMMERCIAL

## 2021-05-03 VITALS
HEART RATE: 66 BPM | OXYGEN SATURATION: 100 % | SYSTOLIC BLOOD PRESSURE: 118 MMHG | HEIGHT: 64 IN | DIASTOLIC BLOOD PRESSURE: 60 MMHG | TEMPERATURE: 98 F | WEIGHT: 225 LBS | BODY MASS INDEX: 38.41 KG/M2 | RESPIRATION RATE: 14 BRPM

## 2021-05-03 DIAGNOSIS — K20.0 EOSINOPHILIC ESOPHAGITIS: ICD-10-CM

## 2021-05-03 DIAGNOSIS — Z01.818 PRE-OP TESTING: Primary | ICD-10-CM

## 2021-05-03 PROCEDURE — 43249 ESOPH EGD DILATION <30 MM: CPT | Performed by: INTERNAL MEDICINE

## 2021-05-03 PROCEDURE — 43239 EGD BIOPSY SINGLE/MULTIPLE: CPT | Performed by: INTERNAL MEDICINE

## 2021-05-03 PROCEDURE — 0D728ZZ DILATION OF MIDDLE ESOPHAGUS, VIA NATURAL OR ARTIFICIAL OPENING ENDOSCOPIC: ICD-10-PCS | Performed by: INTERNAL MEDICINE

## 2021-05-03 PROCEDURE — 0D738ZZ DILATION OF LOWER ESOPHAGUS, VIA NATURAL OR ARTIFICIAL OPENING ENDOSCOPIC: ICD-10-PCS | Performed by: INTERNAL MEDICINE

## 2021-05-03 PROCEDURE — 0DB28ZX EXCISION OF MIDDLE ESOPHAGUS, VIA NATURAL OR ARTIFICIAL OPENING ENDOSCOPIC, DIAGNOSTIC: ICD-10-PCS | Performed by: INTERNAL MEDICINE

## 2021-05-03 PROCEDURE — 0DB38ZX EXCISION OF LOWER ESOPHAGUS, VIA NATURAL OR ARTIFICIAL OPENING ENDOSCOPIC, DIAGNOSTIC: ICD-10-PCS | Performed by: INTERNAL MEDICINE

## 2021-05-03 RX ORDER — SODIUM CHLORIDE, SODIUM LACTATE, POTASSIUM CHLORIDE, CALCIUM CHLORIDE 600; 310; 30; 20 MG/100ML; MG/100ML; MG/100ML; MG/100ML
INJECTION, SOLUTION INTRAVENOUS CONTINUOUS
Status: DISCONTINUED | OUTPATIENT
Start: 2021-05-03 | End: 2021-05-03

## 2021-05-03 RX ORDER — LIDOCAINE HYDROCHLORIDE 10 MG/ML
INJECTION, SOLUTION EPIDURAL; INFILTRATION; INTRACAUDAL; PERINEURAL AS NEEDED
Status: DISCONTINUED | OUTPATIENT
Start: 2021-05-03 | End: 2021-05-03 | Stop reason: SURG

## 2021-05-03 RX ORDER — BUDESONIDE 1 MG/2ML
INHALANT ORAL
Qty: 60 EACH | Refills: 3 | Status: SHIPPED | OUTPATIENT
Start: 2021-05-03

## 2021-05-03 RX ORDER — GLYCOPYRROLATE 0.2 MG/ML
INJECTION, SOLUTION INTRAMUSCULAR; INTRAVENOUS AS NEEDED
Status: DISCONTINUED | OUTPATIENT
Start: 2021-05-03 | End: 2021-05-03 | Stop reason: SURG

## 2021-05-03 RX ORDER — NALOXONE HYDROCHLORIDE 0.4 MG/ML
80 INJECTION, SOLUTION INTRAMUSCULAR; INTRAVENOUS; SUBCUTANEOUS AS NEEDED
Status: DISCONTINUED | OUTPATIENT
Start: 2021-05-03 | End: 2021-05-03

## 2021-05-03 RX ADMIN — LIDOCAINE HYDROCHLORIDE 100 MG: 10 INJECTION, SOLUTION EPIDURAL; INFILTRATION; INTRACAUDAL; PERINEURAL at 10:44:00

## 2021-05-03 RX ADMIN — GLYCOPYRROLATE 0.2 MG: 0.2 INJECTION, SOLUTION INTRAMUSCULAR; INTRAVENOUS at 10:43:00

## 2021-05-03 RX ADMIN — SODIUM CHLORIDE, SODIUM LACTATE, POTASSIUM CHLORIDE, CALCIUM CHLORIDE: 600; 310; 30; 20 INJECTION, SOLUTION INTRAVENOUS at 11:02:00

## 2021-05-03 NOTE — H&P
History & Physical Examination    Patient Name: Rupali Pierce  MRN: [id number]  CSN: [id number]  YOB: 1995    Diagnosis:     EoE  Dysphagia        Levocetirizine Dihydrochloride 5 MG Oral Tab, Take 1 tablet (5 mg total) by mouth nightly., Dis (Other) Mother    • Hypertension Maternal Grandmother    • Hypertension Maternal Grandfather      Social History    Tobacco Use      Smoking status: Never Smoker      Smokeless tobacco: Never Used      Tobacco comment: Mom smoked when she was growing up.

## 2021-05-03 NOTE — OPERATIVE REPORT
[facility] Endoscopy Report      Preoperative Diagnosis:  - history of EoE  - dysphagia      Postoperative Diagnosis:  -Subtle furrowing of the esophagus pretty much from the midesophagus down to the distal esophagus, TTS balloon dilation o noted in the body and fundic region of the stomach. The duodenal bulb and post bulbar regions were normal.    Estimated blood loss-insignificant    Specimens-biopsies of distal and midesophagus.     Impression:  -Subtle furrowing of the esophagus pretty m

## 2021-05-03 NOTE — TELEPHONE ENCOUNTER
Case discussed with Dr. Justen Kiser following EGD 5/3/21. She did not tolerate fluticasone. Plan to continue with ppi and start budesonide bid. Consider referral to tertiary gi if on-going symptoms.

## 2021-05-03 NOTE — ANESTHESIA POSTPROCEDURE EVALUATION
Patient: Wolfgang Ferrer    Procedure Summary     Date: 05/03/21 Room / Location: [address] [place] [address] ENDOSCOPY 05 / [address] [place] [address] ENDOSCOPY    Anesthesia Start: 4345 Anesthesia Stop:     Procedure: ESOPHAGOGASTRODUODENOSCOPY (EGD) (N/A ) Diagnosis:       Eosinophilic esophagitis

## 2021-05-03 NOTE — ANESTHESIA PREPROCEDURE EVALUATION
Anesthesia PreOp Note    HPI:     Severa Penta is a 30year old female who presents for preoperative consultation requested by: Hien Darling MD    Date of Surgery: 5/3/2021    Procedure(s):  ESOPHAGOGASTRODUODENOSCOPY (EGD)  Indication: Eosinophilic 4/29/2021  Multiple Vitamins-Minerals (MULTIVITAMIN ADULT OR), Take by mouth., Disp: , Rfl: , 4/26/2021  ibuprofen 200 MG Oral Tab, Take 800 mg by mouth every 6 (six) hours as needed for Pain., Disp: , Rfl: , prn      lactated ringers infusion, , Intraveno Stress:       Feeling of Stress :   Social Connections:       Frequency of Communication with Friends and Family:       Frequency of Social Gatherings with Friends and Family:       Attends [employer] Services:       Active Member of Clubs or Organization [facility]  Informed Consent Plan and Risks Discussed With:  Patient  Discussed plan with:  CRNA      I have informed Anola Mems and/or legal guardian or family member of the nature of the anesthetic plan, benefits, risks including possible dental damage if r

## 2021-05-18 ENCOUNTER — TELEPHONE (OUTPATIENT)
Dept: GASTROENTEROLOGY | Facility: CLINIC | Age: 26
End: 2021-05-18

## 2021-05-18 DIAGNOSIS — K20.0 EOSINOPHILIC ESOPHAGITIS: Primary | ICD-10-CM

## 2021-05-18 RX ORDER — OMEPRAZOLE 40 MG/1
40 CAPSULE, DELAYED RELEASE ORAL 2 TIMES DAILY
Qty: 60 CAPSULE | Refills: 3 | Status: SHIPPED | OUTPATIENT
Start: 2021-05-18 | End: 2021-08-03

## 2021-05-18 NOTE — TELEPHONE ENCOUNTER
She reports burning in her esophagus worse after eating. She denies dysphagia, food impaction. She did not tolerate fluticasone. Recommend stopping pantoprazole and trial omeprazole bid and continue budesonide 1 mg bid. Reviewed proper admin of swallowed steroid and avoidance of eating/drinkin 30 min after taking therapy  Avoid hard solids, extra care with chewing.     Refer to tertiary gi center[facility]  Report to er if condition decline

## 2021-05-18 NOTE — TELEPHONE ENCOUNTER
[facility] as we have discussed for PPI/ steroid refractory EoE symptoms

## 2021-05-18 NOTE — TELEPHONE ENCOUNTER
Faxed referral, most recent clinical records and patients insurance information to Dr. Doherty Route office (F: [phone]). Fax confirmation received. Updated patient on referral sent and provided number to schedule with Dr. Gianna Mann (P: [phone]). Patient request to send number through [employer].

## 2021-05-18 NOTE — TELEPHONE ENCOUNTER
Claude Triana,    Patient had EGD on 5/3. Calling with c/o burning/pain 8/10 when swallowing, in throat and chest since the procedure. States her appetite is poor, not able to eat more than a portion of a meal each day, tolerates liquids. Denies nausea, vomiting, fever/chills. She has been taking pantoprazole and budesonide as prescribed. No other  medications taken for pain. I advised patient if pain becomes severe or has trouble breathing to go to ED. Patient states she is ok right now. Please advise, thank you.

## 2021-08-02 NOTE — TELEPHONE ENCOUNTER
Requested Prescriptions     Pending Prescriptions Disp Refills   • PANTOPRAZOLE 40 MG Oral Tab EC [Pharmacy Med Name: Pantoprazole Sodium Ec 40 Mg Tab Auro] 60 tablet 0     Sig: TAKE ONE TABLET BY MOUTH TWICE DAILY       LOV:   04/07/2021  LR:   04/07/2021

## 2021-08-03 RX ORDER — PANTOPRAZOLE SODIUM 40 MG/1
TABLET, DELAYED RELEASE ORAL
Qty: 60 TABLET | Refills: 0 | Status: SHIPPED | OUTPATIENT
Start: 2021-08-03

## 2021-08-23 ENCOUNTER — HOSPITAL ENCOUNTER (EMERGENCY)
Facility: HOSPITAL | Age: 26
Discharge: HOME | End: 2021-08-23
Payer: COMMERCIAL

## 2021-08-23 ENCOUNTER — NURSE TRIAGE (OUTPATIENT)
Dept: FAMILY MEDICINE CLINIC | Facility: CLINIC | Age: 26
End: 2021-08-23

## 2021-08-23 ENCOUNTER — APPOINTMENT (OUTPATIENT)
Dept: GENERAL RADIOLOGY | Facility: HOSPITAL | Age: 26
End: 2021-08-23
Attending: NURSE PRACTITIONER
Payer: COMMERCIAL

## 2021-08-23 VITALS
OXYGEN SATURATION: 100 % | RESPIRATION RATE: 16 BRPM | SYSTOLIC BLOOD PRESSURE: 135 MMHG | DIASTOLIC BLOOD PRESSURE: 85 MMHG | HEART RATE: 62 BPM | HEIGHT: 64 IN | BODY MASS INDEX: 38.41 KG/M2 | TEMPERATURE: 98 F | WEIGHT: 225 LBS

## 2021-08-23 DIAGNOSIS — R07.89 CHEST PAIN, ATYPICAL: Primary | ICD-10-CM

## 2021-08-23 LAB
ANION GAP SERPL CALC-SCNC: 5 MMOL/L (ref 0–18)
B-HCG UR QL: NEGATIVE
BASOPHILS # BLD AUTO: 0.03 X10(3) UL (ref 0–0.2)
BASOPHILS NFR BLD AUTO: 0.4 %
BUN BLD-MCNC: 12 MG/DL (ref 7–18)
BUN/CREAT SERPL: 15.8 (ref 10–20)
CALCIUM BLD-MCNC: 9.1 MG/DL (ref 8.5–10.1)
CHLORIDE SERPL-SCNC: 108 MMOL/L (ref 98–112)
CO2 SERPL-SCNC: 26 MMOL/L (ref 21–32)
CREAT BLD-MCNC: 0.76 MG/DL
DEPRECATED RDW RBC AUTO: 38.7 FL (ref 35.1–46.3)
EOSINOPHIL # BLD AUTO: 0.12 X10(3) UL (ref 0–0.7)
EOSINOPHIL NFR BLD AUTO: 1.7 %
ERYTHROCYTE [DISTWIDTH] IN BLOOD BY AUTOMATED COUNT: 12.7 % (ref 11–15)
GLUCOSE BLD-MCNC: 83 MG/DL (ref 70–99)
HCT VFR BLD AUTO: 41.1 %
HGB BLD-MCNC: 13.3 G/DL
IMM GRANULOCYTES # BLD AUTO: 0.01 X10(3) UL (ref 0–1)
IMM GRANULOCYTES NFR BLD: 0.1 %
LYMPHOCYTES # BLD AUTO: 1.69 X10(3) UL (ref 1–4)
LYMPHOCYTES NFR BLD AUTO: 24 %
MCH RBC QN AUTO: 27.4 PG (ref 26–34)
MCHC RBC AUTO-ENTMCNC: 32.4 G/DL (ref 31–37)
MCV RBC AUTO: 84.6 FL
MONOCYTES # BLD AUTO: 0.28 X10(3) UL (ref 0.1–1)
MONOCYTES NFR BLD AUTO: 4 %
NEUTROPHILS # BLD AUTO: 4.92 X10 (3) UL (ref 1.5–7.7)
NEUTROPHILS # BLD AUTO: 4.92 X10(3) UL (ref 1.5–7.7)
NEUTROPHILS NFR BLD AUTO: 69.8 %
OSMOLALITY SERPL CALC.SUM OF ELEC: 287 MOSM/KG (ref 275–295)
PLATELET # BLD AUTO: 335 10(3)UL (ref 150–450)
POTASSIUM SERPL-SCNC: 4.4 MMOL/L (ref 3.5–5.1)
RBC # BLD AUTO: 4.86 X10(6)UL
SODIUM SERPL-SCNC: 139 MMOL/L (ref 136–145)
TROPONIN I SERPL-MCNC: <0.045 NG/ML (ref ?–0.04)
TSI SER-ACNC: 1.53 MIU/ML (ref 0.36–3.74)
WBC # BLD AUTO: 7.1 X10(3) UL (ref 4–11)

## 2021-08-23 PROCEDURE — 81025 URINE PREGNANCY TEST: CPT

## 2021-08-23 PROCEDURE — 71045 X-RAY EXAM CHEST 1 VIEW: CPT | Performed by: NURSE PRACTITIONER

## 2021-08-23 PROCEDURE — 84443 ASSAY THYROID STIM HORMONE: CPT | Performed by: NURSE PRACTITIONER

## 2021-08-23 PROCEDURE — 93010 ELECTROCARDIOGRAM REPORT: CPT | Performed by: INTERNAL MEDICINE

## 2021-08-23 PROCEDURE — 93005 ELECTROCARDIOGRAM TRACING: CPT

## 2021-08-23 PROCEDURE — 84484 ASSAY OF TROPONIN QUANT: CPT | Performed by: NURSE PRACTITIONER

## 2021-08-23 PROCEDURE — 80048 BASIC METABOLIC PNL TOTAL CA: CPT

## 2021-08-23 PROCEDURE — 99284 EMERGENCY DEPT VISIT MOD MDM: CPT

## 2021-08-23 PROCEDURE — 99285 EMERGENCY DEPT VISIT HI MDM: CPT

## 2021-08-23 PROCEDURE — 36415 COLL VENOUS BLD VENIPUNCTURE: CPT

## 2021-08-23 PROCEDURE — 85025 COMPLETE CBC W/AUTO DIFF WBC: CPT

## 2021-08-23 NOTE — ED PROVIDER NOTES
Patient Seen in: [facility] Emergency Department      History   Patient presents with:  Chest Pain Angina  Hypertension    Stated Complaint: chest pain, hypertension    HPI/Subjective:   27yo/f w hx of depression, GERD reports to the ED With compl Normocephalic and atraumatic. Eyes:      Conjunctiva/sclera: Conjunctivae normal.      Pupils: Pupils are equal, round, and reactive to light. Cardiovascular:      Rate and Rhythm: Normal rate and regular rhythm. Heart sounds: Normal heart sounds. ordered/planned    .                              Disposition and Plan     Clinical Impression:  Chest pain, atypical  (primary encounter diagnosis)     Disposition:  Discharge  8/23/2021  5:46 pm    Follow-up:  MD Marianne Srinivasan. 15

## 2021-08-23 NOTE — ED INITIAL ASSESSMENT (HPI)
Patient notes chest pain and hypertension x 2 weeks. Denies shortness of breath. No hx of hypertension.

## 2021-08-24 NOTE — TELEPHONE ENCOUNTER
The patient is following up to state she was seen in the ED and is to have tests done. She lost the paperwork. I stated the orders are in the system. The call was transferred to schedule the tests ordered.

## 2021-08-24 NOTE — TELEPHONE ENCOUNTER
ED  Discharged   8/23/2021 (1 hours)  [facility] Emergency Department   Treatment team  Providers  Chest pain, atypical  Clinical impression  Chest Pain Angina • Hypertension  Chief complaint

## 2021-09-03 ENCOUNTER — HOSPITAL ENCOUNTER (OUTPATIENT)
Dept: CV DIAGNOSTICS | Facility: HOSPITAL | Age: 26
Discharge: HOME | End: 2021-09-03
Attending: FAMILY MEDICINE
Payer: COMMERCIAL

## 2021-09-03 DIAGNOSIS — R42 EPISODIC LIGHTHEADEDNESS: ICD-10-CM

## 2021-09-03 PROCEDURE — 93225 XTRNL ECG REC<48 HRS REC: CPT | Performed by: FAMILY MEDICINE

## 2021-09-03 PROCEDURE — 93306 TTE W/DOPPLER COMPLETE: CPT | Performed by: FAMILY MEDICINE

## 2021-09-03 PROCEDURE — 93227 XTRNL ECG REC<48 HR R&I: CPT | Performed by: FAMILY MEDICINE

## 2021-11-22 ENCOUNTER — TELEPHONE (OUTPATIENT)
Dept: FAMILY MEDICINE CLINIC | Facility: CLINIC | Age: 26
End: 2021-11-22

## 2021-11-22 NOTE — TELEPHONE ENCOUNTER
----- Message from Pino  sent at 11/22/2021  2:02 PM CST -----  Regarding: Concerning the booster   I received the booster on November 16 and I haven’t felt good at all. It feels like I have covid again.  I have cold chills, fatigue, headache, bod

## 2021-11-23 NOTE — TELEPHONE ENCOUNTER
Got the booster 11/16. Fatigue, throat, cough, chills. Body aches. Chest pain all over, but this is normal for her. Denies shortness of breath, nausea, vomiting. Unsure of COVID exposure. Works as a CNA. Was in [facility] November 4-9. Denies fever.

## 2021-12-02 NOTE — TELEPHONE ENCOUNTER
Pt calling stating that she has a question to ask the Dr specifically and did not state exact question. No symptoms but is requesting if Dr can call her when she is available. Please advise.

## 2021-12-02 NOTE — TELEPHONE ENCOUNTER
Talked to patient, she reports that she has been having increasing stress and anxiety as she has been taking care of her mother while she has been in the hospital, her mother was diagnosed with occlusion of bypass graft as well as recent need for medial fa

## 2022-01-03 ENCOUNTER — NURSE TRIAGE (OUTPATIENT)
Dept: FAMILY MEDICINE CLINIC | Facility: CLINIC | Age: 27
End: 2022-01-03

## 2022-01-03 NOTE — TELEPHONE ENCOUNTER
Dayquil not working  Cough medicine   PCR test pending.   No fever, really  Action Requested: Summary for Provider     []  Critical Lab, Recommendations Needed  [] Need Additional Advice  []   FYI    []   Need Orders  [] Need Medications Sent to Pharmacy  [

## 2022-01-04 ENCOUNTER — TELEMEDICINE (OUTPATIENT)
Dept: FAMILY MEDICINE CLINIC | Facility: CLINIC | Age: 27
End: 2022-01-04

## 2022-01-04 DIAGNOSIS — J39.9 UPPER RESPIRATORY DISEASE: Primary | ICD-10-CM

## 2022-01-04 PROCEDURE — 99213 OFFICE O/P EST LOW 20 MIN: CPT | Performed by: FAMILY MEDICINE

## 2022-01-04 RX ORDER — BENZONATATE 100 MG/1
100 CAPSULE ORAL 3 TIMES DAILY PRN
Qty: 30 CAPSULE | Refills: 0 | Status: SHIPPED | OUTPATIENT
Start: 2022-01-04

## 2022-01-04 NOTE — PROGRESS NOTES
This visit is conducted using Telemedicine with live, interactive video and audio. Patient has been referred to the [facility] website at [email or web address] to review the yearly Consent to Treat document.     Patient understands and accepts financial res Diagnosis Date   • COVID-19    • Depression    • Eosinophilic esophagitis    • Esophageal reflux    • Gastritis       Social History:  Social History    Tobacco Use      Smoking status: Never Smoker      Smokeless tobacco: Never Used      Tobacco comment

## 2022-01-06 ENCOUNTER — TELEPHONE (OUTPATIENT)
Dept: FAMILY MEDICINE CLINIC | Facility: CLINIC | Age: 27
End: 2022-01-06

## 2022-01-06 NOTE — TELEPHONE ENCOUNTER
Pt had tele visit 1/4/22, not feeling better, coughing while speaking , no fever 97.4, cold chills/hot , feels tired, very thirsty   PCR negative after [place]  Asking if need to be retested , video visit made     Was exposed to parents, covid positive

## 2022-01-07 ENCOUNTER — HOSPITAL ENCOUNTER (OUTPATIENT)
Dept: GENERAL RADIOLOGY | Age: 27
Discharge: HOME | End: 2022-01-07
Attending: PHYSICIAN ASSISTANT
Payer: COMMERCIAL

## 2022-01-07 ENCOUNTER — TELEMEDICINE (OUTPATIENT)
Dept: FAMILY MEDICINE CLINIC | Facility: CLINIC | Age: 27
End: 2022-01-07

## 2022-01-07 ENCOUNTER — LAB ENCOUNTER (OUTPATIENT)
Dept: LAB | Age: 27
End: 2022-01-07
Attending: PHYSICIAN ASSISTANT
Payer: COMMERCIAL

## 2022-01-07 DIAGNOSIS — R05.9 COUGH: ICD-10-CM

## 2022-01-07 DIAGNOSIS — J39.9 UPPER RESPIRATORY DISEASE: ICD-10-CM

## 2022-01-07 DIAGNOSIS — J39.9 UPPER RESPIRATORY DISEASE: Primary | ICD-10-CM

## 2022-01-07 PROCEDURE — 99213 OFFICE O/P EST LOW 20 MIN: CPT | Performed by: PHYSICIAN ASSISTANT

## 2022-01-07 PROCEDURE — 71046 X-RAY EXAM CHEST 2 VIEWS: CPT | Performed by: PHYSICIAN ASSISTANT

## 2022-01-07 RX ORDER — AZITHROMYCIN 250 MG/1
TABLET, FILM COATED ORAL
Qty: 6 TABLET | Refills: 0 | Status: SHIPPED | OUTPATIENT
Start: 2022-01-07 | End: 2022-01-12

## 2022-01-08 NOTE — PROGRESS NOTES
HPI: HPI    I spoke to [facility] via video call, verified date of birth, and discussed current concerns.   Onset/duration of current symptoms:  2 weeks    Common COVID-19 symptoms per CDC: CDC Clinical Guidance  • Fever:     Yes []     No [x] every 6 (six) hours as needed for Pain.          Allergies:     Cephalexin              RASH, ITCHING  Penicillins             RASH    History:   Annual Physical Never done  Pap Smear,3 Years Never done  Influenza Vaccine(1) due on 10/01/2021  Annual Depres file  Transportation Needs: Not on file  Physical Activity: Not on file  Stress: Not on file  Social Connections: Not on file  Intimate Partner Violence: Not on file  Housing Stability: Not on file    Review of Systems:   Review of Systems   Constitutional

## 2022-01-09 LAB — SARS-COV-2 RNA RESP QL NAA+PROBE: NOT DETECTED

## 2023-03-03 ENCOUNTER — PATIENT MESSAGE (OUTPATIENT)
Dept: FAMILY MEDICINE CLINIC | Facility: CLINIC | Age: 28
End: 2023-03-03

## 2023-03-03 ENCOUNTER — NURSE TRIAGE (OUTPATIENT)
Dept: FAMILY MEDICINE CLINIC | Facility: CLINIC | Age: 28
End: 2023-03-03

## 2023-03-03 ENCOUNTER — TELEMEDICINE (OUTPATIENT)
Dept: FAMILY MEDICINE CLINIC | Facility: CLINIC | Age: 28
End: 2023-03-03

## 2023-03-03 DIAGNOSIS — U07.1 COVID-19: Primary | ICD-10-CM

## 2023-03-03 LAB — AMB EXT COVID-19 RESULT: DETECTED

## 2023-03-21 ENCOUNTER — OFFICE VISIT (OUTPATIENT)
Dept: FAMILY MEDICINE CLINIC | Facility: CLINIC | Age: 28
End: 2023-03-21

## 2023-03-21 VITALS
HEIGHT: 64 IN | RESPIRATION RATE: 17 BRPM | HEART RATE: 79 BPM | BODY MASS INDEX: 42.66 KG/M2 | SYSTOLIC BLOOD PRESSURE: 139 MMHG | DIASTOLIC BLOOD PRESSURE: 86 MMHG | WEIGHT: 249.88 LBS

## 2023-03-21 DIAGNOSIS — Z13.220 LIPID SCREENING: ICD-10-CM

## 2023-03-21 DIAGNOSIS — Z12.4 CERVICAL CANCER SCREENING: ICD-10-CM

## 2023-03-21 DIAGNOSIS — Z11.3 SCREENING FOR STDS (SEXUALLY TRANSMITTED DISEASES): ICD-10-CM

## 2023-03-21 DIAGNOSIS — K20.0 EOSINOPHILIC ESOPHAGITIS: ICD-10-CM

## 2023-03-21 DIAGNOSIS — Z00.00 WELLNESS EXAMINATION: Primary | ICD-10-CM

## 2023-03-21 DIAGNOSIS — Z23 NEED FOR DIPHTHERIA-TETANUS-PERTUSSIS (TDAP) VACCINE: ICD-10-CM

## 2023-03-21 DIAGNOSIS — Z13.29 THYROID DISORDER SCREEN: ICD-10-CM

## 2023-03-21 DIAGNOSIS — Z13.0 SCREENING FOR DEFICIENCY ANEMIA: ICD-10-CM

## 2023-03-21 PROCEDURE — 99214 OFFICE O/P EST MOD 30 MIN: CPT | Performed by: FAMILY MEDICINE

## 2023-03-21 PROCEDURE — 90471 IMMUNIZATION ADMIN: CPT | Performed by: FAMILY MEDICINE

## 2023-03-21 PROCEDURE — 3008F BODY MASS INDEX DOCD: CPT | Performed by: FAMILY MEDICINE

## 2023-03-21 PROCEDURE — 3079F DIAST BP 80-89 MM HG: CPT | Performed by: FAMILY MEDICINE

## 2023-03-21 PROCEDURE — 99395 PREV VISIT EST AGE 18-39: CPT | Performed by: FAMILY MEDICINE

## 2023-03-21 PROCEDURE — 90715 TDAP VACCINE 7 YRS/> IM: CPT | Performed by: FAMILY MEDICINE

## 2023-03-21 PROCEDURE — 3075F SYST BP GE 130 - 139MM HG: CPT | Performed by: FAMILY MEDICINE

## 2023-03-21 RX ORDER — PANTOPRAZOLE SODIUM 40 MG/1
40 TABLET, DELAYED RELEASE ORAL 2 TIMES DAILY
Qty: 60 TABLET | Refills: 0 | Status: SHIPPED | OUTPATIENT
Start: 2023-03-21

## 2023-03-21 RX ORDER — OMEPRAZOLE 40 MG/1
CAPSULE, DELAYED RELEASE ORAL
COMMUNITY
Start: 2023-02-20 | End: 2023-03-21 | Stop reason: ALTCHOICE

## 2023-03-22 LAB
C TRACH DNA SPEC QL NAA+PROBE: NEGATIVE
N GONORRHOEA DNA SPEC QL NAA+PROBE: NEGATIVE

## 2023-04-05 LAB — HPV I/H RISK 1 DNA SPEC QL NAA+PROBE: NEGATIVE

## 2023-04-20 ENCOUNTER — PATIENT MESSAGE (OUTPATIENT)
Dept: FAMILY MEDICINE CLINIC | Facility: CLINIC | Age: 28
End: 2023-04-20

## 2023-04-20 ENCOUNTER — OFFICE VISIT (OUTPATIENT)
Dept: FAMILY MEDICINE CLINIC | Facility: CLINIC | Age: 28
End: 2023-04-20

## 2023-04-20 ENCOUNTER — LAB ENCOUNTER (OUTPATIENT)
Dept: LAB | Age: 28
End: 2023-04-20
Attending: FAMILY MEDICINE
Payer: COMMERCIAL

## 2023-04-20 VITALS
SYSTOLIC BLOOD PRESSURE: 126 MMHG | BODY MASS INDEX: 43.19 KG/M2 | HEART RATE: 71 BPM | HEIGHT: 64 IN | DIASTOLIC BLOOD PRESSURE: 80 MMHG | WEIGHT: 253 LBS

## 2023-04-20 DIAGNOSIS — Z11.3 SCREENING FOR STDS (SEXUALLY TRANSMITTED DISEASES): ICD-10-CM

## 2023-04-20 DIAGNOSIS — Z00.00 WELLNESS EXAMINATION: ICD-10-CM

## 2023-04-20 DIAGNOSIS — Z13.29 THYROID DISORDER SCREEN: ICD-10-CM

## 2023-04-20 DIAGNOSIS — Z13.220 LIPID SCREENING: ICD-10-CM

## 2023-04-20 DIAGNOSIS — Z13.0 SCREENING FOR DEFICIENCY ANEMIA: ICD-10-CM

## 2023-04-20 DIAGNOSIS — D22.9 BENIGN MOLE: Primary | ICD-10-CM

## 2023-04-20 LAB
ALBUMIN SERPL-MCNC: 3.6 G/DL (ref 3.4–5)
ALBUMIN/GLOB SERPL: 1 {RATIO} (ref 1–2)
ALP LIVER SERPL-CCNC: 52 U/L
ALT SERPL-CCNC: 25 U/L
ANION GAP SERPL CALC-SCNC: 9 MMOL/L (ref 0–18)
AST SERPL-CCNC: 20 U/L (ref 15–37)
BASOPHILS # BLD AUTO: 0.07 X10(3) UL (ref 0–0.2)
BASOPHILS NFR BLD AUTO: 0.8 %
BILIRUB SERPL-MCNC: 0.9 MG/DL (ref 0.1–2)
BUN BLD-MCNC: 20 MG/DL (ref 7–18)
BUN/CREAT SERPL: 25.3 (ref 10–20)
CALCIUM BLD-MCNC: 8.7 MG/DL (ref 8.5–10.1)
CHLORIDE SERPL-SCNC: 107 MMOL/L (ref 98–112)
CHOLEST SERPL-MCNC: 152 MG/DL (ref ?–200)
CO2 SERPL-SCNC: 25 MMOL/L (ref 21–32)
CREAT BLD-MCNC: 0.79 MG/DL
DEPRECATED RDW RBC AUTO: 39.2 FL (ref 35.1–46.3)
EOSINOPHIL # BLD AUTO: 0.28 X10(3) UL (ref 0–0.7)
EOSINOPHIL NFR BLD AUTO: 3.2 %
ERYTHROCYTE [DISTWIDTH] IN BLOOD BY AUTOMATED COUNT: 13 % (ref 11–15)
FASTING PATIENT LIPID ANSWER: NO
FASTING STATUS PATIENT QL REPORTED: NO
GFR SERPLBLD BASED ON 1.73 SQ M-ARVRAT: 104 ML/MIN/1.73M2 (ref 60–?)
GLOBULIN PLAS-MCNC: 3.5 G/DL (ref 2.8–4.4)
GLUCOSE BLD-MCNC: 99 MG/DL (ref 70–99)
HBV SURFACE AB SER QL: REACTIVE
HBV SURFACE AB SERPL IA-ACNC: 104.96 MIU/ML
HCT VFR BLD AUTO: 36.2 %
HDLC SERPL-MCNC: 70 MG/DL (ref 40–59)
HGB BLD-MCNC: 11.7 G/DL
IMM GRANULOCYTES # BLD AUTO: 0.03 X10(3) UL (ref 0–1)
IMM GRANULOCYTES NFR BLD: 0.3 %
LDLC SERPL CALC-MCNC: 69 MG/DL (ref ?–100)
LYMPHOCYTES # BLD AUTO: 1.99 X10(3) UL (ref 1–4)
LYMPHOCYTES NFR BLD AUTO: 22.8 %
MCH RBC QN AUTO: 26.7 PG (ref 26–34)
MCHC RBC AUTO-ENTMCNC: 32.3 G/DL (ref 31–37)
MCV RBC AUTO: 82.6 FL
MONOCYTES # BLD AUTO: 0.65 X10(3) UL (ref 0.1–1)
MONOCYTES NFR BLD AUTO: 7.5 %
NEUTROPHILS # BLD AUTO: 5.69 X10 (3) UL (ref 1.5–7.7)
NEUTROPHILS # BLD AUTO: 5.69 X10(3) UL (ref 1.5–7.7)
NEUTROPHILS NFR BLD AUTO: 65.4 %
NONHDLC SERPL-MCNC: 82 MG/DL (ref ?–130)
OSMOLALITY SERPL CALC.SUM OF ELEC: 295 MOSM/KG (ref 275–295)
PLATELET # BLD AUTO: 335 10(3)UL (ref 150–450)
POTASSIUM SERPL-SCNC: 4 MMOL/L (ref 3.5–5.1)
PROT SERPL-MCNC: 7.1 G/DL (ref 6.4–8.2)
RBC # BLD AUTO: 4.38 X10(6)UL
SODIUM SERPL-SCNC: 141 MMOL/L (ref 136–145)
TRIGL SERPL-MCNC: 62 MG/DL (ref 30–149)
TSI SER-ACNC: 1.88 MIU/ML (ref 0.36–3.74)
VLDLC SERPL CALC-MCNC: 9 MG/DL (ref 0–30)
WBC # BLD AUTO: 8.7 X10(3) UL (ref 4–11)

## 2023-04-20 PROCEDURE — 86706 HEP B SURFACE ANTIBODY: CPT

## 2023-04-20 PROCEDURE — 99213 OFFICE O/P EST LOW 20 MIN: CPT | Performed by: PHYSICIAN ASSISTANT

## 2023-04-20 PROCEDURE — 3079F DIAST BP 80-89 MM HG: CPT | Performed by: PHYSICIAN ASSISTANT

## 2023-04-20 PROCEDURE — 86780 TREPONEMA PALLIDUM: CPT

## 2023-04-20 PROCEDURE — 80061 LIPID PANEL: CPT

## 2023-04-20 PROCEDURE — 36415 COLL VENOUS BLD VENIPUNCTURE: CPT

## 2023-04-20 PROCEDURE — 87389 HIV-1 AG W/HIV-1&-2 AB AG IA: CPT

## 2023-04-20 PROCEDURE — 85025 COMPLETE CBC W/AUTO DIFF WBC: CPT

## 2023-04-20 PROCEDURE — 84443 ASSAY THYROID STIM HORMONE: CPT

## 2023-04-20 PROCEDURE — 3008F BODY MASS INDEX DOCD: CPT | Performed by: PHYSICIAN ASSISTANT

## 2023-04-20 PROCEDURE — 3074F SYST BP LT 130 MM HG: CPT | Performed by: PHYSICIAN ASSISTANT

## 2023-04-20 PROCEDURE — 80053 COMPREHEN METABOLIC PANEL: CPT

## 2023-04-21 LAB — T PALLIDUM AB SER QL: NEGATIVE

## 2023-04-21 NOTE — TELEPHONE ENCOUNTER
INSOMENIA message sent with instructions per protocol.      Future Appointments   Date Time Provider Jasper Stiles   5/25/2023  3:00 PM Jailene Og

## 2023-04-23 PROCEDURE — 96360 HYDRATION IV INFUSION INIT: CPT

## 2023-04-23 PROCEDURE — 99284 EMERGENCY DEPT VISIT MOD MDM: CPT

## 2023-04-23 PROCEDURE — 93010 ELECTROCARDIOGRAM REPORT: CPT

## 2023-04-24 ENCOUNTER — HOSPITAL ENCOUNTER (EMERGENCY)
Facility: HOSPITAL | Age: 28
Discharge: HOME | End: 2023-04-24
Payer: COMMERCIAL

## 2023-04-24 ENCOUNTER — TELEPHONE (OUTPATIENT)
Dept: FAMILY MEDICINE CLINIC | Facility: CLINIC | Age: 28
End: 2023-04-24

## 2023-04-24 VITALS
RESPIRATION RATE: 21 BRPM | SYSTOLIC BLOOD PRESSURE: 155 MMHG | HEART RATE: 79 BPM | DIASTOLIC BLOOD PRESSURE: 98 MMHG | OXYGEN SATURATION: 100 % | TEMPERATURE: 98 F

## 2023-04-24 DIAGNOSIS — R42 DIZZINESS: Primary | ICD-10-CM

## 2023-04-24 LAB
ANION GAP SERPL CALC-SCNC: 9 MMOL/L (ref 0–18)
ATRIAL RATE: 57 BPM
BASOPHILS # BLD AUTO: 0.07 X10(3) UL (ref 0–0.2)
BASOPHILS NFR BLD AUTO: 0.7 %
BUN BLD-MCNC: 13 MG/DL (ref 7–18)
BUN/CREAT SERPL: 19.4 (ref 10–20)
CALCIUM BLD-MCNC: 9.1 MG/DL (ref 8.5–10.1)
CHLORIDE SERPL-SCNC: 109 MMOL/L (ref 98–112)
CO2 SERPL-SCNC: 24 MMOL/L (ref 21–32)
CREAT BLD-MCNC: 0.67 MG/DL
DEPRECATED RDW RBC AUTO: 38.1 FL (ref 35.1–46.3)
EOSINOPHIL # BLD AUTO: 0.48 X10(3) UL (ref 0–0.7)
EOSINOPHIL NFR BLD AUTO: 4.6 %
ERYTHROCYTE [DISTWIDTH] IN BLOOD BY AUTOMATED COUNT: 12.7 % (ref 11–15)
GFR SERPLBLD BASED ON 1.73 SQ M-ARVRAT: 121 ML/MIN/1.73M2 (ref 60–?)
GLUCOSE BLD-MCNC: 86 MG/DL (ref 70–99)
HCT VFR BLD AUTO: 37.4 %
HGB BLD-MCNC: 12.2 G/DL
IMM GRANULOCYTES # BLD AUTO: 0.03 X10(3) UL (ref 0–1)
IMM GRANULOCYTES NFR BLD: 0.3 %
LYMPHOCYTES # BLD AUTO: 3.67 X10(3) UL (ref 1–4)
LYMPHOCYTES NFR BLD AUTO: 34.9 %
MCH RBC QN AUTO: 26.8 PG (ref 26–34)
MCHC RBC AUTO-ENTMCNC: 32.6 G/DL (ref 31–37)
MCV RBC AUTO: 82 FL
MONOCYTES # BLD AUTO: 0.69 X10(3) UL (ref 0.1–1)
MONOCYTES NFR BLD AUTO: 6.6 %
NEUTROPHILS # BLD AUTO: 5.59 X10 (3) UL (ref 1.5–7.7)
NEUTROPHILS # BLD AUTO: 5.59 X10(3) UL (ref 1.5–7.7)
NEUTROPHILS NFR BLD AUTO: 52.9 %
OSMOLALITY SERPL CALC.SUM OF ELEC: 293 MOSM/KG (ref 275–295)
P AXIS: 34 DEGREES
P-R INTERVAL: 162 MS
PLATELET # BLD AUTO: 331 10(3)UL (ref 150–450)
POTASSIUM SERPL-SCNC: 3.4 MMOL/L (ref 3.5–5.1)
Q-T INTERVAL: 402 MS
QRS DURATION: 94 MS
QTC CALCULATION (BEZET): 391 MS
R AXIS: 7 DEGREES
RBC # BLD AUTO: 4.56 X10(6)UL
SODIUM SERPL-SCNC: 142 MMOL/L (ref 136–145)
T AXIS: 7 DEGREES
TROPONIN I HIGH SENSITIVITY: 4 NG/L
VENTRICULAR RATE: 57 BPM
WBC # BLD AUTO: 10.5 X10(3) UL (ref 4–11)

## 2023-04-24 PROCEDURE — 84484 ASSAY OF TROPONIN QUANT: CPT | Performed by: NURSE PRACTITIONER

## 2023-04-24 PROCEDURE — 85025 COMPLETE CBC W/AUTO DIFF WBC: CPT | Performed by: NURSE PRACTITIONER

## 2023-04-24 PROCEDURE — 80048 BASIC METABOLIC PNL TOTAL CA: CPT | Performed by: NURSE PRACTITIONER

## 2023-04-24 PROCEDURE — 93005 ELECTROCARDIOGRAM TRACING: CPT

## 2023-04-24 RX ORDER — MECLIZINE HYDROCHLORIDE 25 MG/1
25 TABLET ORAL 3 TIMES DAILY PRN
Qty: 10 TABLET | Refills: 0 | Status: SHIPPED | OUTPATIENT
Start: 2023-04-24 | End: 2023-05-01

## 2023-04-24 NOTE — TELEPHONE ENCOUNTER
Patient called in to make ER follow up appointment:    Future Appointments   Date Time Provider Jasper Stiles   4/25/2023 11:00 AM [facility]   5/25/2023  3:00 PM Jean Duarte

## 2023-04-24 NOTE — ED INITIAL ASSESSMENT (HPI)
Patient arrives ambulatory through triage with c/o of lightheadedness and dizziness that started yesterday.

## 2023-04-25 ENCOUNTER — OFFICE VISIT (OUTPATIENT)
Dept: FAMILY MEDICINE CLINIC | Facility: CLINIC | Age: 28
End: 2023-04-25

## 2023-04-25 VITALS
BODY MASS INDEX: 43.02 KG/M2 | HEART RATE: 69 BPM | WEIGHT: 252 LBS | DIASTOLIC BLOOD PRESSURE: 79 MMHG | HEIGHT: 64 IN | SYSTOLIC BLOOD PRESSURE: 123 MMHG

## 2023-04-25 DIAGNOSIS — H81.10 BENIGN PAROXYSMAL POSITIONAL VERTIGO, UNSPECIFIED LATERALITY: Primary | ICD-10-CM

## 2023-04-25 PROCEDURE — 3074F SYST BP LT 130 MM HG: CPT | Performed by: FAMILY MEDICINE

## 2023-04-25 PROCEDURE — 3078F DIAST BP <80 MM HG: CPT | Performed by: FAMILY MEDICINE

## 2023-04-25 PROCEDURE — 3008F BODY MASS INDEX DOCD: CPT | Performed by: FAMILY MEDICINE

## 2023-04-25 PROCEDURE — 99213 OFFICE O/P EST LOW 20 MIN: CPT | Performed by: FAMILY MEDICINE

## 2023-04-25 RX ORDER — MECLIZINE HYDROCHLORIDE 25 MG/1
25 TABLET ORAL 3 TIMES DAILY PRN
Qty: 45 TABLET | Refills: 0 | Status: SHIPPED | OUTPATIENT
Start: 2023-04-25

## 2023-04-25 NOTE — PROGRESS NOTES
Blood pressure 123/79, pulse 69, height 5' 4\" (1.626 m), weight 252 lb (114.3 kg), last menstrual period 03/27/2023, not currently breastfeeding. Complaining of dizziness for the past 3 days. No vomiting some nausea. Denies tinnitus or hearing loss. She is able to get out of bed but with difficulty. She was given meclizine in the emergency room yesterday with some relief.     Objective      CN II-XII GROSSLY INTACT MUSCLE STRENGTH +5/5 UPPER AND LOWER EXTREMITIES B/L DTR'S UPPER AND LOWER +2/4 UPPER AND LOWER EXTREMITIES B/L NEG PRONATOR DRIFT NEG BABINSKI NO CEREBELLAR SIGNS VISUAL PARADA INTACT    Niurka-Hallpike maneuver equivocal to the right  TMs intact no redness on ear exam  Assessment benign paroxysmal positional vertigo    Plan Barany maneuvers and meclizine prescription given    Follow-up if no improvement

## 2023-05-25 ENCOUNTER — OFFICE VISIT (OUTPATIENT)
Dept: GASTROENTEROLOGY | Facility: CLINIC | Age: 28
End: 2023-05-25

## 2023-05-25 ENCOUNTER — TELEPHONE (OUTPATIENT)
Facility: CLINIC | Age: 28
End: 2023-05-25

## 2023-05-25 VITALS
DIASTOLIC BLOOD PRESSURE: 88 MMHG | WEIGHT: 251 LBS | BODY MASS INDEX: 42.85 KG/M2 | HEIGHT: 64 IN | SYSTOLIC BLOOD PRESSURE: 129 MMHG

## 2023-05-25 DIAGNOSIS — K20.0 EOSINOPHILIC ESOPHAGITIS: Primary | ICD-10-CM

## 2023-05-25 PROCEDURE — 3079F DIAST BP 80-89 MM HG: CPT | Performed by: NURSE PRACTITIONER

## 2023-05-25 PROCEDURE — 99215 OFFICE O/P EST HI 40 MIN: CPT | Performed by: NURSE PRACTITIONER

## 2023-05-25 PROCEDURE — 3074F SYST BP LT 130 MM HG: CPT | Performed by: NURSE PRACTITIONER

## 2023-05-25 PROCEDURE — 3008F BODY MASS INDEX DOCD: CPT | Performed by: NURSE PRACTITIONER

## 2023-05-25 RX ORDER — OMEPRAZOLE 40 MG/1
40 CAPSULE, DELAYED RELEASE ORAL DAILY
Qty: 30 CAPSULE | Refills: 11 | Status: SHIPPED | OUTPATIENT
Start: 2023-05-25

## 2023-05-25 RX ORDER — BUDESONIDE 0.5 MG/2ML
INHALANT ORAL
Qty: 480 ML | Refills: 0 | Status: SHIPPED | OUTPATIENT
Start: 2023-05-25

## 2023-05-25 NOTE — TELEPHONE ENCOUNTER
Scheduled for:  EGD 99822 with dilatation   Provider Name:  Dr Rachelle Lyn  Date:  09/14/2023  Location:  [facility]   Sedation:  MAC  Time:  11:30am (Patient is aware arrival time is at 10:30am)  Prep:  NPO after Midnight Prep Instructions Given At The Office Visit. Meds/Allergies Reconciled?:  Krut Russ NP, Reviewed    Diagnosis with codes:  Eosinophilic esophagitis Z29.7    Was patient informed to call insurance with codes (Y/N):  Yes  Referral sent?:  Referral was sent at the time of electronic surgical scheduling. [facility] or [id number]  notified?:  I sent an electronic request to Endo Scheduling and received a confirmation today. Medication Orders:  Pt is aware to NOT take iron pills, herbal meds and diet supplements for 7 days before exam. Also to NOT take any form of alcohol, recreational drugs and any forms of ED meds 24 hours before exam.   Misc Orders:     Further instructions given by staff:  I provide prep instructions to patient at the time of the appointment and reviewed date, time and location, she verbalized that she understood and is aware to call if she has any questions.

## 2023-06-19 ENCOUNTER — HOSPITAL ENCOUNTER (OUTPATIENT)
Dept: GENERAL RADIOLOGY | Age: 28
Discharge: HOME | End: 2023-06-19
Attending: FAMILY MEDICINE
Payer: COMMERCIAL

## 2023-06-19 ENCOUNTER — OFFICE VISIT (OUTPATIENT)
Dept: FAMILY MEDICINE CLINIC | Facility: CLINIC | Age: 28
End: 2023-06-19

## 2023-06-19 VITALS
DIASTOLIC BLOOD PRESSURE: 87 MMHG | RESPIRATION RATE: 17 BRPM | HEART RATE: 84 BPM | WEIGHT: 248 LBS | SYSTOLIC BLOOD PRESSURE: 140 MMHG | HEIGHT: 64 IN | BODY MASS INDEX: 42.34 KG/M2

## 2023-06-19 DIAGNOSIS — Z20.89 EXPOSURE TO PNEUMONIA: ICD-10-CM

## 2023-06-19 DIAGNOSIS — J02.0 STREP THROAT: Primary | ICD-10-CM

## 2023-06-19 LAB
CONTROL LINE PRESENT WITH A CLEAR BACKGROUND (YES/NO): YES YES/NO
KIT LOT #: 5675 NUMERIC
STREP GRP A CUL-SCR: POSITIVE

## 2023-06-19 PROCEDURE — 3077F SYST BP >= 140 MM HG: CPT | Performed by: FAMILY MEDICINE

## 2023-06-19 PROCEDURE — 71046 X-RAY EXAM CHEST 2 VIEWS: CPT | Performed by: FAMILY MEDICINE

## 2023-06-19 PROCEDURE — 3008F BODY MASS INDEX DOCD: CPT | Performed by: FAMILY MEDICINE

## 2023-06-19 PROCEDURE — 87880 STREP A ASSAY W/OPTIC: CPT | Performed by: FAMILY MEDICINE

## 2023-06-19 PROCEDURE — 99213 OFFICE O/P EST LOW 20 MIN: CPT | Performed by: FAMILY MEDICINE

## 2023-06-19 PROCEDURE — 3079F DIAST BP 80-89 MM HG: CPT | Performed by: FAMILY MEDICINE

## 2023-06-19 RX ORDER — AZITHROMYCIN 250 MG/1
TABLET, FILM COATED ORAL
Qty: 6 TABLET | Refills: 0 | Status: SHIPPED | OUTPATIENT
Start: 2023-06-19 | End: 2023-06-24

## 2023-06-22 ENCOUNTER — TELEPHONE (OUTPATIENT)
Dept: FAMILY MEDICINE CLINIC | Facility: CLINIC | Age: 28
End: 2023-06-22

## 2023-06-22 RX ORDER — GUAIFENESIN DEXTROMETHORPHAN HYDROBROMIDE ORAL SOLUTION 10; 100 MG/5ML; MG/5ML
5 SOLUTION ORAL EVERY 12 HOURS
Qty: 118 ML | Refills: 0 | Status: SHIPPED | OUTPATIENT
Start: 2023-06-22

## 2023-06-22 NOTE — TELEPHONE ENCOUNTER
Spoke with pt,  verified, she stated she was seen by Dr Forrest Underwood on 23 for positive strep. Pt stated she has one more day left of abx ( zpack) but still has a cough, chest hurts when coughing with rate 5/10 and nothing comes out, she tried dayqul, she also took her left over benzonatate TID that was rx'd  but its not working. Pt was advised do not take  cough rx. Pt has overall 30 % improvement, she is a little better. Pt was advised to push fluid, steam shower and continue monitor her sx, if sx persist or gets worse to go to ER/ IC, she agreed and stated understanding. pls advise, thanks in advance.

## 2023-06-22 NOTE — TELEPHONE ENCOUNTER
I am sending in a prescription for guaifenesin, presents with sending a new prescription for weight-based penicillin, I agree with agree with recommendations.   If she is also noticing increased congestion she can resume her nasal saline as well as Flonase

## 2023-09-11 ENCOUNTER — TELEPHONE (OUTPATIENT)
Facility: CLINIC | Age: 28
End: 2023-09-11

## 2023-09-11 ENCOUNTER — TELEMEDICINE (OUTPATIENT)
Dept: FAMILY MEDICINE CLINIC | Facility: CLINIC | Age: 28
End: 2023-09-11

## 2023-09-11 DIAGNOSIS — K20.0 EOSINOPHILIC ESOPHAGITIS: Primary | ICD-10-CM

## 2023-09-11 DIAGNOSIS — R05.1 ACUTE COUGH: ICD-10-CM

## 2023-09-11 DIAGNOSIS — R06.02 SHORTNESS OF BREATH: ICD-10-CM

## 2023-09-11 DIAGNOSIS — U07.1 COVID-19 VIRUS INFECTION: Primary | ICD-10-CM

## 2023-09-11 RX ORDER — ALBUTEROL SULFATE 90 UG/1
2 AEROSOL, METERED RESPIRATORY (INHALATION) EVERY 4 HOURS PRN
Qty: 18 G | Refills: 0 | Status: SHIPPED | OUTPATIENT
Start: 2023-09-11

## 2023-09-11 RX ORDER — BENZONATATE 200 MG/1
200 CAPSULE ORAL 3 TIMES DAILY PRN
Qty: 30 CAPSULE | Refills: 0 | Status: SHIPPED | OUTPATIENT
Start: 2023-09-11

## 2023-09-11 NOTE — PROGRESS NOTES
HPI:     I spoke to Aristeo Fermin via video call, verified date of birth, and discussed current concerns. HPI  Patient tested positive for Covid 3 days ago. Patient's symptoms started 3 days ago. Patient has sore throat, nasal congestion, dry cough, runny nose, SOB sometimes. Patient has been taking DayQuil and NyQuil without relief. Patient denies of chest pain, N/V/C/D, fever, dizziness, syncope, abdominal pain. There are no other concerns today. Medications:     Current Outpatient Medications   Medication Sig Dispense Refill    nirmatrelvir-ritonavir 300-100 MG Oral Tablet Therapy Pack Take two nirmatrelvir tablets (300mg) with one ritonavir tablet (100mg) together twice daily for 5 days. 30 tablet 0    benzonatate 200 MG Oral Cap Take 1 capsule (200 mg total) by mouth 3 (three) times daily as needed for cough. 30 capsule 0    albuterol 108 (90 Base) MCG/ACT Inhalation Aero Soln Inhale 2 puffs into the lungs every 4 (four) hours as needed for Wheezing or Shortness of Breath. 18 g 0    dextromethorphan-guaiFENesin  MG/5ML Oral Liquid Take 5 mL by mouth every 12 (twelve) hours. 118 mL 0    budesonide 0.5 MG/2ML Inhalation Suspension Mix 8 ml with splenda or honey twice daily 480 mL 0    Omeprazole 40 MG Oral Capsule Delayed Release Take 1 capsule (40 mg total) by mouth daily. Take 1 capsule by mouth daily before breakfast. 30 capsule 11    meclizine 25 MG Oral Tab Take 1 tablet (25 mg total) by mouth 3 (three) times daily as needed. 45 tablet 0    pantoprazole 40 MG Oral Tab EC Take 1 tablet (40 mg total) by mouth 2 (two) times daily. (Patient not taking: Reported on 6/19/2023) 60 tablet 0    Fluticasone Propionate (FLONASE) 50 MCG/ACT Nasal Suspension 2 sprays by Nasal route daily. 1 Bottle 0    zinc sulfate 220 (50 Zn) MG Oral Cap Take 50 mg by mouth daily. (Patient not taking: Reported on 6/19/2023)      Multiple Vitamins-Minerals (MULTIVITAMIN ADULT OR) Take by mouth.          Allergies: Dust Mite Extract       ANAPHYLAXIS  Cephalexin              RASH, ITCHING  Penicillins             RASH    History:   HTN: BP Follow-Up due on 07/19/2023  Influenza Vaccine(1) due on 10/01/2023  Annual Physical due on 03/21/2024  Pap Smear due on 03/21/2026  DTaP,Tdap,and Td Vaccines(2 - Td or Tdap) due on 03/21/2033  Annual Depression Screening Completed  COVID-19 Vaccine Completed  Pneumococcal Vaccine: Birth to 56yrs Aged Out    Patient's last menstrual period was 03/27/2023 (within days). Past Medical History:     Past Medical History:   Diagnosis Date    COVID-19     Depression     Eosinophilic esophagitis     Esophageal reflux     Gastritis        Past Surgical History:     Past Surgical History:   Procedure Laterality Date    Cholecystectomy  12/1/15    Gastro - internal  02/2021    Other      left ankle    Removal gallbladder         Family History:     Family History   Problem Relation Age of Onset    Diabetes Mother         type 1    Other (Other) Mother     Hypertension Maternal Grandmother     Hypertension Maternal Grandfather        Social History:   Social History    Socioeconomic History      Marital status: Single      Spouse name: Not on file      Number of children: Not on file      Years of education: Not on file      Highest education level: Not on file    Occupational History      Not on file    Tobacco Use      Smoking status: Never      Smokeless tobacco: Never      Tobacco comments: Mom smoked when she was growing up.       Vaping Use      Vaping Use: Never used    Substance and Sexual Activity      Alcohol use: Yes        Comment: occasional      Drug use: No      Sexual activity: Not on file    Other Topics      Concerns:        Caffeine Concern: Not Asked        Exercise: Not Asked        Seat Belt: Not Asked        Special Diet: Not Asked        Stress Concern: Not Asked        Weight Concern: Not Asked    Social History Narrative      Not on file    Social Determinants of Health  Financial Resource Strain: Not on file  Food Insecurity: Not on file  Transportation Needs: Not on file  Physical Activity: Not on file  Stress: Not on file  Social Connections: Not on file  Housing Stability: Not on file    Review of Systems:   Review of Systems   Constitutional:  Positive for chills and fatigue. Negative for activity change and fever. HENT:  Positive for congestion and sore throat. Negative for ear discharge, ear pain, postnasal drip, rhinorrhea, sinus pressure and sinus pain. Respiratory:  Positive for cough and shortness of breath. Negative for chest tightness and wheezing. Cardiovascular:  Negative for chest pain and palpitations. Gastrointestinal:  Negative for abdominal distention, abdominal pain, blood in stool, constipation, diarrhea, nausea and vomiting. Skin:  Negative for rash. There were no vitals filed for this visit. There is no height or weight on file to calculate BMI. Physical Exam:   Physical Exam   Patient alert and orient x3, able to carry on conversation easily, without shortness of breath, coughing, can speak in full sentences. Assessment and Plan[de-identified]     Problem List Items Addressed This Visit    None  Visit Diagnoses       COVID-19 virus infection    -  Primary    Relevant Medications    nirmatrelvir-ritonavir 300-100 MG Oral Tablet Therapy Pack    Acute cough        Relevant Medications        benzonatate 200 MG Oral Cap        Shortness of breath        Relevant Medications        albuterol 108 (90 Base) MCG/ACT Inhalation Aero Soln          Discussed plan of care with pt and pt is in agreement. All questions answered. Pt to call with questions or concerns.

## 2023-09-11 NOTE — TELEPHONE ENCOUNTER
Canceled per patient for:  EGD 43851 with dilatation   Provider Name:  Dr Bianca Ratliff  Date:  09/14/2023  Location:  [facility]   Sedation:  MAC  Time:  11:30am      Canceled in epic and endo

## 2023-09-14 ENCOUNTER — PATIENT MESSAGE (OUTPATIENT)
Dept: FAMILY MEDICINE CLINIC | Facility: CLINIC | Age: 28
End: 2023-09-14

## 2023-09-15 ENCOUNTER — TELEPHONE (OUTPATIENT)
Dept: FAMILY MEDICINE CLINIC | Facility: CLINIC | Age: 28
End: 2023-09-15

## 2023-09-18 ENCOUNTER — HOSPITAL ENCOUNTER (OUTPATIENT)
Dept: GENERAL RADIOLOGY | Age: 28
Discharge: HOME | End: 2023-09-18
Attending: FAMILY MEDICINE
Payer: COMMERCIAL

## 2023-09-18 ENCOUNTER — OFFICE VISIT (OUTPATIENT)
Dept: FAMILY MEDICINE CLINIC | Facility: CLINIC | Age: 28
End: 2023-09-18

## 2023-09-18 ENCOUNTER — LAB ENCOUNTER (OUTPATIENT)
Dept: LAB | Age: 28
End: 2023-09-18
Attending: FAMILY MEDICINE
Payer: COMMERCIAL

## 2023-09-18 VITALS
DIASTOLIC BLOOD PRESSURE: 82 MMHG | RESPIRATION RATE: 17 BRPM | HEART RATE: 75 BPM | HEIGHT: 64 IN | WEIGHT: 237 LBS | OXYGEN SATURATION: 100 % | SYSTOLIC BLOOD PRESSURE: 129 MMHG | BODY MASS INDEX: 40.46 KG/M2

## 2023-09-18 DIAGNOSIS — J20.8 ACUTE BRONCHITIS DUE TO OTHER SPECIFIED ORGANISMS: ICD-10-CM

## 2023-09-18 DIAGNOSIS — T76.21XA: ICD-10-CM

## 2023-09-18 DIAGNOSIS — T76.21XA: Primary | ICD-10-CM

## 2023-09-18 DIAGNOSIS — L01.00 IMPETIGO: Primary | ICD-10-CM

## 2023-09-18 LAB
BASOPHILS # BLD AUTO: 0.07 X10(3) UL (ref 0–0.2)
BASOPHILS NFR BLD AUTO: 0.8 %
DEPRECATED RDW RBC AUTO: 37.4 FL (ref 35.1–46.3)
EOSINOPHIL # BLD AUTO: 0.47 X10(3) UL (ref 0–0.7)
EOSINOPHIL NFR BLD AUTO: 5.4 %
ERYTHROCYTE [DISTWIDTH] IN BLOOD BY AUTOMATED COUNT: 12.7 % (ref 11–15)
HCG SERPL QL: NEGATIVE
HCT VFR BLD AUTO: 40.9 %
HGB BLD-MCNC: 13.3 G/DL
IMM GRANULOCYTES # BLD AUTO: 0.03 X10(3) UL (ref 0–1)
IMM GRANULOCYTES NFR BLD: 0.3 %
LYMPHOCYTES # BLD AUTO: 1.79 X10(3) UL (ref 1–4)
LYMPHOCYTES NFR BLD AUTO: 20.4 %
MCH RBC QN AUTO: 26.3 PG (ref 26–34)
MCHC RBC AUTO-ENTMCNC: 32.5 G/DL (ref 31–37)
MCV RBC AUTO: 81 FL
MONOCYTES # BLD AUTO: 0.42 X10(3) UL (ref 0.1–1)
MONOCYTES NFR BLD AUTO: 4.8 %
NEUTROPHILS # BLD AUTO: 5.98 X10 (3) UL (ref 1.5–7.7)
NEUTROPHILS # BLD AUTO: 5.98 X10(3) UL (ref 1.5–7.7)
NEUTROPHILS NFR BLD AUTO: 68.3 %
PLATELET # BLD AUTO: 406 10(3)UL (ref 150–450)
RBC # BLD AUTO: 5.05 X10(6)UL
WBC # BLD AUTO: 8.8 X10(3) UL (ref 4–11)

## 2023-09-18 PROCEDURE — 71046 X-RAY EXAM CHEST 2 VIEWS: CPT | Performed by: FAMILY MEDICINE

## 2023-09-18 PROCEDURE — 87591 N.GONORRHOEAE DNA AMP PROB: CPT

## 2023-09-18 PROCEDURE — 87389 HIV-1 AG W/HIV-1&-2 AB AG IA: CPT | Performed by: FAMILY MEDICINE

## 2023-09-18 PROCEDURE — 86780 TREPONEMA PALLIDUM: CPT | Performed by: FAMILY MEDICINE

## 2023-09-18 PROCEDURE — 3008F BODY MASS INDEX DOCD: CPT | Performed by: FAMILY MEDICINE

## 2023-09-18 PROCEDURE — 3079F DIAST BP 80-89 MM HG: CPT | Performed by: FAMILY MEDICINE

## 2023-09-18 PROCEDURE — 87491 CHLMYD TRACH DNA AMP PROBE: CPT

## 2023-09-18 PROCEDURE — 85025 COMPLETE CBC W/AUTO DIFF WBC: CPT | Performed by: FAMILY MEDICINE

## 2023-09-18 PROCEDURE — 99214 OFFICE O/P EST MOD 30 MIN: CPT | Performed by: FAMILY MEDICINE

## 2023-09-18 PROCEDURE — 84703 CHORIONIC GONADOTROPIN ASSAY: CPT | Performed by: FAMILY MEDICINE

## 2023-09-18 PROCEDURE — 3074F SYST BP LT 130 MM HG: CPT | Performed by: FAMILY MEDICINE

## 2023-09-18 PROCEDURE — 36415 COLL VENOUS BLD VENIPUNCTURE: CPT | Performed by: FAMILY MEDICINE

## 2023-09-18 RX ORDER — PREDNISONE 20 MG/1
TABLET ORAL
Qty: 10 TABLET | Refills: 0 | Status: SHIPPED | OUTPATIENT
Start: 2023-09-18

## 2023-09-19 LAB
C TRACH DNA SPEC QL NAA+PROBE: NEGATIVE
N GONORRHOEA DNA SPEC QL NAA+PROBE: NEGATIVE

## 2023-09-20 ENCOUNTER — TELEPHONE (OUTPATIENT)
Dept: FAMILY MEDICINE CLINIC | Facility: CLINIC | Age: 28
End: 2023-09-20

## 2023-09-20 LAB — T PALLIDUM AB SER QL: NEGATIVE

## 2023-09-20 NOTE — TELEPHONE ENCOUNTER
Spoke to patient. She saw Dr. Dali Welsh on 9/18/23 and was prescribed a steroid. She is feeling worse. She said it is very painful to take a deep breath or to do anything and she coughs so hard that she gets dizzy. She is taking the inhaler and it is helping for a little while but then her chest gets very tight again. Mom was asking if there was an OTC cough medication that could be taken with Benzonatate to help or other recommendations. Chest xray on 9/18 was clear. 2. Acute bronchitis due to other specified organisms  Obtain updated imaging  - predniSONE 20 MG Oral Tab; Take 2 tablets once a day for 5 days  Dispense: 10 tablet; Refill: 0  - XR CHEST PA + LAT CHEST (CPT=71046); Future  - CBC W Differential W Platelet [E]    RN said that she might need to go to ER. She said, [place] is what I was afraid of. \"     Dr. Dali Welsh, please advise on other recommendations.

## 2023-09-20 NOTE — TELEPHONE ENCOUNTER
Noted, I would definitely recommend for her to go to the ER as symptoms should be improving on her current regimen especially with no significant findings in the x-ray

## 2023-09-21 ENCOUNTER — HOSPITAL ENCOUNTER (OUTPATIENT)
Age: 28
Discharge: HOME | End: 2023-09-21
Attending: EMERGENCY MEDICINE
Payer: COMMERCIAL

## 2023-09-21 VITALS
OXYGEN SATURATION: 100 % | RESPIRATION RATE: 20 BRPM | HEART RATE: 74 BPM | TEMPERATURE: 98 F | SYSTOLIC BLOOD PRESSURE: 135 MMHG | DIASTOLIC BLOOD PRESSURE: 85 MMHG

## 2023-09-21 DIAGNOSIS — J40 BRONCHITIS: Primary | ICD-10-CM

## 2023-09-21 PROCEDURE — 99213 OFFICE O/P EST LOW 20 MIN: CPT

## 2023-09-21 PROCEDURE — 99212 OFFICE O/P EST SF 10 MIN: CPT

## 2023-09-21 NOTE — TELEPHONE ENCOUNTER
Called patient (name and  verified) 's recommendations discussed. Patient states she did get very worn out after taking her mom to the doctor's appointment today. Stressed the importance of going to the ED for evaluation. Patient verbalized understanding. Flagged for follow up.

## 2023-09-21 NOTE — ED INITIAL ASSESSMENT (HPI)
Patient arrived ambulatory to room. Patient was covid positive 1 week ago. Was seen by pcp 3 days ago and dx with bronchitis. Patient states she was told by her pcp to come to the [address] [place] [address] today. Patient c/o SOB and chest tightness. +cough. Easy non labored respirations. No distress.

## 2023-09-25 ENCOUNTER — OFFICE VISIT (OUTPATIENT)
Dept: FAMILY MEDICINE CLINIC | Facility: CLINIC | Age: 28
End: 2023-09-25

## 2023-09-25 ENCOUNTER — TELEPHONE (OUTPATIENT)
Dept: FAMILY MEDICINE CLINIC | Facility: CLINIC | Age: 28
End: 2023-09-25

## 2023-09-25 VITALS
SYSTOLIC BLOOD PRESSURE: 125 MMHG | WEIGHT: 241 LBS | HEART RATE: 88 BPM | RESPIRATION RATE: 18 BRPM | HEIGHT: 64 IN | BODY MASS INDEX: 41.15 KG/M2 | TEMPERATURE: 98 F | OXYGEN SATURATION: 98 % | DIASTOLIC BLOOD PRESSURE: 82 MMHG

## 2023-09-25 DIAGNOSIS — R05.2 SUBACUTE COUGH: ICD-10-CM

## 2023-09-25 DIAGNOSIS — J02.9 SORE THROAT: Primary | ICD-10-CM

## 2023-09-25 LAB
CONTROL LINE PRESENT WITH A CLEAR BACKGROUND (YES/NO): YES YES/NO
KIT LOT #: 6668 NUMERIC
STREP GRP A CUL-SCR: POSITIVE

## 2023-09-25 PROCEDURE — 87880 STREP A ASSAY W/OPTIC: CPT | Performed by: FAMILY MEDICINE

## 2023-09-25 PROCEDURE — 3008F BODY MASS INDEX DOCD: CPT | Performed by: FAMILY MEDICINE

## 2023-09-25 PROCEDURE — 99214 OFFICE O/P EST MOD 30 MIN: CPT | Performed by: FAMILY MEDICINE

## 2023-09-25 PROCEDURE — 3074F SYST BP LT 130 MM HG: CPT | Performed by: FAMILY MEDICINE

## 2023-09-25 PROCEDURE — 3079F DIAST BP 80-89 MM HG: CPT | Performed by: FAMILY MEDICINE

## 2023-09-25 RX ORDER — CODEINE PHOSPHATE AND GUAIFENESIN 10; 100 MG/5ML; MG/5ML
5 SOLUTION ORAL EVERY 6 HOURS PRN
Qty: 118 ML | Refills: 0 | Status: SHIPPED | OUTPATIENT
Start: 2023-09-25

## 2023-09-25 RX ORDER — CLARITHROMYCIN 250 MG/1
250 TABLET, FILM COATED ORAL 2 TIMES DAILY
Qty: 20 TABLET | Refills: 0 | Status: SHIPPED | OUTPATIENT
Start: 2023-09-25 | End: 2023-10-05

## 2023-09-25 NOTE — TELEPHONE ENCOUNTER
Pt recently seen in office on 9/18/23 and then in [address] [place] [address] 9/21/23 for \"bronchitis\"    Pt states she is still not feeling better and has cough. Pt coughing frequently during call. Pt states mildly SOB. Pt has f/u OV tomorrow and \"I am supposed to work today, but I am concerned cause I can barely talk without coughing\"    Advised to change OV to today and pt agrees to plan. OV changed to 9/25/23 with Dr Dali Welsh and 9/26/23 OV cancelled.

## 2023-10-02 ENCOUNTER — HOSPITAL ENCOUNTER (OUTPATIENT)
Dept: GENERAL RADIOLOGY | Age: 28
Discharge: HOME | End: 2023-10-02
Attending: FAMILY MEDICINE
Payer: COMMERCIAL

## 2023-10-02 ENCOUNTER — PATIENT MESSAGE (OUTPATIENT)
Dept: FAMILY MEDICINE CLINIC | Facility: CLINIC | Age: 28
End: 2023-10-02

## 2023-10-02 DIAGNOSIS — R05.1 ACUTE COUGH: ICD-10-CM

## 2023-10-02 DIAGNOSIS — R05.2 SUBACUTE COUGH: ICD-10-CM

## 2023-10-02 PROCEDURE — 71046 X-RAY EXAM CHEST 2 VIEWS: CPT | Performed by: FAMILY MEDICINE

## 2023-10-03 NOTE — TELEPHONE ENCOUNTER
Routed to Dr Amparo Ardon for advise, thanks.       LOV 9-25-23      Future Appointments   Date Time Provider Jasper Stiles   10/9/2023 11:00 AM Hayes Begin, LCSW [facility] [place]   10/16/2023 10:30 AM Hayes Begin, LCSW [facility] [place]   10/23/2023 11:00 AM Hayes Begin, LCSW [facility] [place]   10/30/2023 11:00 AM Hayes Begin, LCSW [facility] [place]

## 2023-10-04 RX ORDER — BENZONATATE 200 MG/1
200 CAPSULE ORAL 3 TIMES DAILY PRN
Qty: 30 CAPSULE | Refills: 0 | Status: SHIPPED | OUTPATIENT
Start: 2023-10-04

## 2023-10-04 NOTE — TELEPHONE ENCOUNTER
Spoke to patient. She had not seen the mychart message from Dr. Amparo Ardon. Rn read the message. She said that she is using her inhaler once before work and sometimes needing it again before her 8 hour shift is up. RN relayed that she can use it every 4 hours as needed but to let us know if she is needing it more than 3-4 times a day. She said the Benzonatate helps but doesn't last long. She requested a refill. Dr. Amparo Ardon, please advise.

## 2023-10-09 RX ORDER — PREDNISONE 20 MG/1
TABLET ORAL
Qty: 10 TABLET | Refills: 0 | Status: SHIPPED | OUTPATIENT
Start: 2023-10-09

## 2023-10-09 NOTE — TELEPHONE ENCOUNTER
Spoke to patient. She called back to update Dr. Sandra Sarabia. She was up all night coughing and did need to use to inhaler QID for two days. She is really getting frustrated because she is not getting sleep and she is experiencing lingering weakness in her hands and legs when she exerts herself at work. Dr. Sandra Sarabia, please advise on recommendations.

## 2023-10-10 ENCOUNTER — OFFICE VISIT (OUTPATIENT)
Dept: FAMILY MEDICINE CLINIC | Facility: CLINIC | Age: 28
End: 2023-10-10

## 2023-10-10 VITALS
HEART RATE: 70 BPM | WEIGHT: 241 LBS | HEIGHT: 64 IN | BODY MASS INDEX: 41.15 KG/M2 | SYSTOLIC BLOOD PRESSURE: 114 MMHG | DIASTOLIC BLOOD PRESSURE: 80 MMHG

## 2023-10-10 DIAGNOSIS — J01.40 ACUTE NON-RECURRENT PANSINUSITIS: Primary | ICD-10-CM

## 2023-10-10 PROCEDURE — 99213 OFFICE O/P EST LOW 20 MIN: CPT | Performed by: FAMILY MEDICINE

## 2023-10-10 PROCEDURE — 3079F DIAST BP 80-89 MM HG: CPT | Performed by: FAMILY MEDICINE

## 2023-10-10 PROCEDURE — 3074F SYST BP LT 130 MM HG: CPT | Performed by: FAMILY MEDICINE

## 2023-10-10 PROCEDURE — 3008F BODY MASS INDEX DOCD: CPT | Performed by: FAMILY MEDICINE

## 2023-10-10 RX ORDER — FLUTICASONE PROPIONATE 50 MCG
2 SPRAY, SUSPENSION (ML) NASAL DAILY
Qty: 16 G | Refills: 0 | Status: SHIPPED | OUTPATIENT
Start: 2023-10-10 | End: 2023-10-20

## 2023-10-10 RX ORDER — ECHINACEA PURPUREA EXTRACT 125 MG
1 TABLET ORAL AS NEEDED
Qty: 60 ML | Refills: 0 | Status: SHIPPED | OUTPATIENT
Start: 2023-10-10

## 2023-10-10 RX ORDER — CLINDAMYCIN HYDROCHLORIDE 300 MG/1
300 CAPSULE ORAL 3 TIMES DAILY
Qty: 30 CAPSULE | Refills: 0 | Status: SHIPPED | OUTPATIENT
Start: 2023-10-10 | End: 2023-10-20

## 2023-10-19 ENCOUNTER — TELEPHONE (OUTPATIENT)
Dept: FAMILY MEDICINE CLINIC | Facility: CLINIC | Age: 28
End: 2023-10-19

## 2023-10-19 DIAGNOSIS — R06.02 SHORTNESS OF BREATH: ICD-10-CM

## 2023-10-19 RX ORDER — ALBUTEROL SULFATE 90 UG/1
2 AEROSOL, METERED RESPIRATORY (INHALATION) EVERY 4 HOURS PRN
Qty: 18 G | Refills: 0 | Status: SHIPPED | OUTPATIENT
Start: 2023-10-19

## 2023-10-19 NOTE — TELEPHONE ENCOUNTER
prescription sent, she should go ahead and start the prednisone now as she is using albuterol that much, that way I can see her next week after she has completed the course of steroids and from there decide further adjustment regarding the use of inhalers

## 2023-10-19 NOTE — TELEPHONE ENCOUNTER
Dahiana Milton pt stated that she was in to see you on 10/10/23. Pt stated that she had inform you that she was using the albuterol inhaler that was given to her by Natalia Adams since she was having sob and wheezing and you inform her to continue taking it. Pt will like a refill as she still is using the inhaler BID and she will be out soon. Please advise. Pt stated that she is a little better with the cough but still has sob and some wheezing. Pt stated that she is taking the abx but she has not taken the prednisone as you told her to first take the abx. Pt will like to know if you want her to started the prednisone? Per pt she has picked it up and she does have it. Please advise. Pt denied having a fever.  Pt has a f/u appt to see you on 10/24/2023    Future Appointments   Date Time Provider Jasper Stiles          10/24/2023  9:30 AM Edith Ha MD [facility] [place]   10/30/2023 11:00 AM Helena Palm, LCSW [facility] [place]

## 2023-10-24 ENCOUNTER — LAB ENCOUNTER (OUTPATIENT)
Dept: LAB | Age: 28
End: 2023-10-24
Attending: FAMILY MEDICINE

## 2023-10-24 ENCOUNTER — OFFICE VISIT (OUTPATIENT)
Dept: FAMILY MEDICINE CLINIC | Facility: CLINIC | Age: 28
End: 2023-10-24

## 2023-10-24 VITALS
WEIGHT: 240 LBS | OXYGEN SATURATION: 98 % | RESPIRATION RATE: 17 BRPM | HEART RATE: 81 BPM | SYSTOLIC BLOOD PRESSURE: 136 MMHG | BODY MASS INDEX: 40.97 KG/M2 | DIASTOLIC BLOOD PRESSURE: 81 MMHG | HEIGHT: 64 IN

## 2023-10-24 DIAGNOSIS — R05.3 CHRONIC COUGH: Primary | ICD-10-CM

## 2023-10-24 DIAGNOSIS — R05.3 CHRONIC COUGH: ICD-10-CM

## 2023-10-24 LAB
ALBUMIN SERPL-MCNC: 4 G/DL (ref 3.4–5)
ALBUMIN/GLOB SERPL: 1.2 {RATIO} (ref 1–2)
ALP LIVER SERPL-CCNC: 51 U/L
ALT SERPL-CCNC: 17 U/L
ANION GAP SERPL CALC-SCNC: 8 MMOL/L (ref 0–18)
AST SERPL-CCNC: 11 U/L (ref 15–37)
BILIRUB SERPL-MCNC: 0.6 MG/DL (ref 0.1–2)
BUN BLD-MCNC: 17 MG/DL (ref 7–18)
BUN/CREAT SERPL: 19.5 (ref 10–20)
CALCIUM BLD-MCNC: 9.2 MG/DL (ref 8.5–10.1)
CHLORIDE SERPL-SCNC: 108 MMOL/L (ref 98–112)
CO2 SERPL-SCNC: 27 MMOL/L (ref 21–32)
CREAT BLD-MCNC: 0.87 MG/DL
EGFRCR SERPLBLD CKD-EPI 2021: 92 ML/MIN/1.73M2 (ref 60–?)
FASTING STATUS PATIENT QL REPORTED: YES
GLOBULIN PLAS-MCNC: 3.3 G/DL (ref 2.8–4.4)
GLUCOSE BLD-MCNC: 85 MG/DL (ref 70–99)
OSMOLALITY SERPL CALC.SUM OF ELEC: 297 MOSM/KG (ref 275–295)
POTASSIUM SERPL-SCNC: 3.7 MMOL/L (ref 3.5–5.1)
PROT SERPL-MCNC: 7.3 G/DL (ref 6.4–8.2)
SODIUM SERPL-SCNC: 143 MMOL/L (ref 136–145)

## 2023-10-24 PROCEDURE — 99214 OFFICE O/P EST MOD 30 MIN: CPT | Performed by: FAMILY MEDICINE

## 2023-10-24 PROCEDURE — 3079F DIAST BP 80-89 MM HG: CPT | Performed by: FAMILY MEDICINE

## 2023-10-24 PROCEDURE — 3008F BODY MASS INDEX DOCD: CPT | Performed by: FAMILY MEDICINE

## 2023-10-24 PROCEDURE — 86480 TB TEST CELL IMMUN MEASURE: CPT | Performed by: FAMILY MEDICINE

## 2023-10-24 PROCEDURE — 86615 BORDETELLA ANTIBODY: CPT

## 2023-10-24 PROCEDURE — 80053 COMPREHEN METABOLIC PANEL: CPT | Performed by: FAMILY MEDICINE

## 2023-10-24 PROCEDURE — 3075F SYST BP GE 130 - 139MM HG: CPT | Performed by: FAMILY MEDICINE

## 2023-10-24 PROCEDURE — 36415 COLL VENOUS BLD VENIPUNCTURE: CPT | Performed by: FAMILY MEDICINE

## 2023-10-24 RX ORDER — FLUTICASONE PROPIONATE AND SALMETEROL 100; 50 UG/1; UG/1
1 POWDER RESPIRATORY (INHALATION) 2 TIMES DAILY
Qty: 60 EACH | Refills: 0 | Status: SHIPPED | OUTPATIENT
Start: 2023-10-24

## 2023-10-26 ENCOUNTER — TELEPHONE (OUTPATIENT)
Dept: FAMILY MEDICINE CLINIC | Facility: CLINIC | Age: 28
End: 2023-10-26

## 2023-10-26 LAB
M TB IFN-G CD4+ T-CELLS BLD-ACNC: 0.04 IU/ML
M TB TUBERC IFN-G BLD QL: NEGATIVE
M TB TUBERC IGNF/MITOGEN IGNF CONTROL: 6.73 IU/ML
QFT TB1 AG MINUS NIL: -0.02 IU/ML
QFT TB2 AG MINUS NIL: 0.01 IU/ML

## 2023-10-26 NOTE — TELEPHONE ENCOUNTER
Pt called stated she trying to feel better but frustrated not better, coughing a lot, advised steam treatment, c/o breathing in cold air, advised mask, steam tx, stay hydrated ,verbalized understanding , will have PFT test tomorrow

## 2023-10-27 ENCOUNTER — HOSPITAL ENCOUNTER (OUTPATIENT)
Dept: RESPIRATORY THERAPY | Facility: HOSPITAL | Age: 28
Discharge: HOME | End: 2023-10-27
Attending: FAMILY MEDICINE

## 2023-10-27 ENCOUNTER — TELEPHONE (OUTPATIENT)
Dept: FAMILY MEDICINE CLINIC | Facility: CLINIC | Age: 28
End: 2023-10-27

## 2023-10-27 DIAGNOSIS — R05.3 CHRONIC COUGH: ICD-10-CM

## 2023-10-27 LAB
B PERTUSSIS IGA AB: 1.1 INDEX
B PERTUSSIS IGA AB: 1.1 INDEX
B PERTUSSIS IGG AB: 4.93 INDEX
B PERTUSSIS IGG AB: 4.93 INDEX

## 2023-10-27 PROCEDURE — 94729 DIFFUSING CAPACITY: CPT | Performed by: INTERNAL MEDICINE

## 2023-10-27 PROCEDURE — 94726 PLETHYSMOGRAPHY LUNG VOLUMES: CPT | Performed by: INTERNAL MEDICINE

## 2023-10-27 PROCEDURE — 94060 EVALUATION OF WHEEZING: CPT | Performed by: INTERNAL MEDICINE

## 2023-10-27 RX ORDER — AZITHROMYCIN 250 MG/1
TABLET, FILM COATED ORAL
Qty: 6 TABLET | Refills: 0 | Status: SHIPPED | OUTPATIENT
Start: 2023-10-27 | End: 2023-10-31

## 2023-10-27 NOTE — TELEPHONE ENCOUNTER
Left message to call back please transfer to triage. A mychart message was also sent. We do need to speak to her. I did not send all the information in the [place] message.

## 2023-10-27 NOTE — TELEPHONE ENCOUNTER
Patient contacts clinic inquiring on positive pertussis results. 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 She is supposed to work today at 1. Dr. Rhianna Gomez please advise.

## 2023-10-27 NOTE — TELEPHONE ENCOUNTER
Results reviewed, please inform pt prescription for azithromycin has been sent, she has been symptomatic now for a few weeks but I'd advise to inform her supervisor, she should stay out of work for 5 days after she complete treatment.  (Letter sent) I have also sent a prescription for her mother and please ask her to tell her father to ask his PCP for post exposure prophylaxis as well

## 2023-10-27 NOTE — PROCEDURES
[facility]    PFT Interpretation    [address]     1995 MRN [id number]   Height  59 inh  Age 25year old   Weight  240 lbs  Sex Female         Spirometry:   FEV1 3.55 L which is 110%  FEV1/FVC ratio 79% which is normal    Significant bronchodilator response by improving FEV1 15% and greater than 200 cc      FVL:   Normal      Lung Volume:   TLC 6.94 L which is 137%  Vital capacity 4.50 L is 119%  RV/TLC ratio is normal      DLCO:   130% which is normal      Impression:   Significant bronchodilator response and clinical correlation is needed to rule out underlying reactive airway/asthma  Otherwise normal PFTs        Thank you for allowing me to participate in the care of your patient. Soco Abbasi.  Yeny Morrell MD  10/27/2023  2:41 PM

## 2023-10-28 NOTE — TELEPHONE ENCOUNTER
Patient called back. I Spoke to patient. With mother in background. Provider's response given to patient. Dr Cindy Sher: patient wanted to schedule a f/u appt next week to go over everything. Is there a day and time we can put her on schedule for video visit? Your schedule is booked. FYI: Patient went to work yesterday; she did have a face shield and mask. She was constantly sanitizing. Patient will let her supervisor know. Her father's PCP is DR Cindy Sher. (Will send separate message to PCP to obtain zpak)     Discussed precautionary measures. Red flags discussed. Patient verbally understood to go to ER if has any significant changes, or worsening symptoms.

## 2023-10-30 ENCOUNTER — TELEPHONE (OUTPATIENT)
Dept: FAMILY MEDICINE CLINIC | Facility: CLINIC | Age: 28
End: 2023-10-30

## 2023-10-30 NOTE — TELEPHONE ENCOUNTER
----------------------------------------------------------------------  [facility] DEPT Xiomara Soares [phone] RE;PT SHERRIE RODRIGUEZ,   89 NEEDS SPECIFIC DX FOR PT TO NOTIFY OTHERS THAT MAY HAVE COME IN CONTACT Paged at number :  PAGE: [phone] at : Oct-  12:48

## 2023-10-30 NOTE — TELEPHONE ENCOUNTER
[facility] is calling for a copy of the Pertussis lab results and the last office notes, last OV 10/24/23. [phone]    Spoke with Janeth Carranza, from Health dept, line verified. Phone number [phone]    Requested records sent through Right Fax.

## 2023-10-30 NOTE — TELEPHONE ENCOUNTER
Spoke to patient. Assisted patient with scheduling appointment. Call ended before asking if she had any additional questions. Left message to call back.      Future Appointments   Date Time Provider Jasper Stiles   10/30/2023 11:00 AM Alger Exon, LCSW [facility] [place]   11/1/2023  9:30 AM Dana Patel MD [facility] [place]

## 2023-10-30 NOTE — TELEPHONE ENCOUNTER
Received call back from [place] with [address], [place]     He received the Office Visit note and labs. But needs documentation from encounter when patient called office and was prescribed Azithromycin 250mg     See 10/27/23 Telephone Encounter    Fax confirmed with Rosie:     Denise Donnelly, [facility]. Department   [phone]  [phone]       The 10/27/23 Telephone Encounter was Faxed.    Confirmation Received

## 2023-10-30 NOTE — TELEPHONE ENCOUNTER
Please send this message to PCP, Dr. Irma Whitt. I was on call and in the car and I had let them know that I did not know the exact diagnosis.

## 2023-10-30 NOTE — TELEPHONE ENCOUNTER
Patient returned our call ( Identified name and  )  Patient has no further questions at this time       Future Appointments   Date Time Provider Jasper Stiles   10/30/2023 11:00 AM ASHU Maher [place]   2023  9:30 AM Angy Rodriguez MD [facility] [place]

## 2023-11-01 ENCOUNTER — TELEPHONE (OUTPATIENT)
Dept: PULMONOLOGY | Facility: CLINIC | Age: 28
End: 2023-11-01

## 2023-11-01 ENCOUNTER — TELEMEDICINE (OUTPATIENT)
Dept: FAMILY MEDICINE CLINIC | Facility: CLINIC | Age: 28
End: 2023-11-01
Payer: COMMERCIAL

## 2023-11-01 DIAGNOSIS — R05.3 CHRONIC COUGH: Primary | ICD-10-CM

## 2023-11-01 PROCEDURE — 99214 OFFICE O/P EST MOD 30 MIN: CPT | Performed by: FAMILY MEDICINE

## 2023-11-01 RX ORDER — PREDNISONE 20 MG/1
TABLET ORAL
Qty: 10 TABLET | Refills: 0 | Status: SHIPPED | OUTPATIENT
Start: 2023-11-01

## 2023-11-01 RX ORDER — CIMETIDINE 400 MG/1
400 TABLET, FILM COATED ORAL NIGHTLY
Qty: 30 TABLET | Refills: 1 | Status: SHIPPED | OUTPATIENT
Start: 2023-11-01

## 2023-11-01 RX ORDER — FLUTICASONE PROPIONATE AND SALMETEROL 250; 50 UG/1; UG/1
1 POWDER RESPIRATORY (INHALATION) EVERY 12 HOURS SCHEDULED
Qty: 60 EACH | Refills: 0 | Status: SHIPPED | OUTPATIENT
Start: 2023-11-01

## 2023-11-01 NOTE — PROGRESS NOTES
This visit is conducted using Telemedicine with live, interactive video and audio. Patient has been referred to the [facility] website at [email or web address] to review the yearly Consent to Treat document. Patient understands and accepts financial responsibility for any deductible, co-insurance and/or co-pays associated with this service. Preethi Barrera is a 25year old female. HPI:   Patient is here for follow-up, she reported that she will be completing azithromycin course today, she is still having excessive cough, intermittent shortness of breath and wheezing, she continues to use albuterol every 4 hours, has been compliant with the use of Advair as well, upon questioning she also report that she has noted that after eating she is having reflux  Current Outpatient Medications   Medication Sig Dispense Refill    fluticasone-salmeterol (ADVAIR DISKUS) 250-50 MCG/ACT Inhalation Aerosol Powder, Breath Activated Inhale 1 puff into the lungs every 12 (twelve) hours. 60 each 0    predniSONE 20 MG Oral Tab Take 2 tablets once a day for 5 days 10 tablet 0    cimetidine 400 MG Oral Tab Take 1 tablet (400 mg total) by mouth nightly. 30 tablet 1    Acetaminophen-guaiFENesin 325-200 MG Oral Tab Take 1 tablet by mouth in the morning and 1 tablet before bedtime. Do all this for 14 days. 60 tablet 0    albuterol 108 (90 Base) MCG/ACT Inhalation Aero Soln Inhale 2 puffs into the lungs every 4 (four) hours as needed for Wheezing or Shortness of Breath. 18 g 0    sodium chloride 0.65 % Nasal Solution 1 spray by Nasal route as needed. 60 mL 0    guaiFENesin-codeine 100-10 MG/5ML Oral Solution Take 5 mL by mouth every 6 (six) hours as needed for cough. 118 mL 0    mupirocin 2 % External Ointment Apply to affect lesions tid 30 g 1    budesonide 0.5 MG/2ML Inhalation Suspension Mix 8 ml with splenda or honey twice daily 480 mL 0    Omeprazole 40 MG Oral Capsule Delayed Release Take 1 capsule (40 mg total) by mouth daily. Take 1 capsule by mouth daily before breakfast. 30 capsule 11    meclizine 25 MG Oral Tab Take 1 tablet (25 mg total) by mouth 3 (three) times daily as needed. 45 tablet 0    zinc sulfate 220 (50 Zn) MG Oral Cap Take 50 mg by mouth daily. Multiple Vitamins-Minerals (MULTIVITAMIN ADULT OR) Take by mouth. Past Medical History:   Diagnosis Date    COVID-19     Depression     Eosinophilic esophagitis     Esophageal reflux     Gastritis       Social History:  Social History     Socioeconomic History    Marital status: Single   Tobacco Use    Smoking status: Never    Smokeless tobacco: Never    Tobacco comments:     Mom smoked when she was growing up. Vaping Use    Vaping Use: Never used   Substance and Sexual Activity    Alcohol use: Yes     Comment: occasional    Drug use: No          EXAM:   LMP 09/11/2023 (Within Days)     Physical Exam  Constitutional:       General: She is not in acute distress. Pulmonary:      Effort: Pulmonary effort is normal.   Neurological:      Mental Status: She is alert and oriented to person, place, and time. Psychiatric:         Mood and Affect: Mood normal.         Behavior: Behavior normal.            ASSESSMENT AND PLAN:   1. Chronic cough  We discussed results of PFT that do correlate with asthma, patient also recently diagnosed with proptosis, she had recent sinus infection as well as COVID, she is still presenting with cough, overall wheezing has improved but has persisted as well, she is using albuterol 4 times a week despite the use of Advair, will adjust Advair, new course of steroids provided, referral to follow-up with pulmonology, will also add cimetidine as patient does have underlying GERD that may be exacerbating her cough and was instructed to follow-up with GI as well  - fluticasone-salmeterol (ADVAIR DISKUS) 250-50 MCG/ACT Inhalation Aerosol Powder, Breath Activated; Inhale 1 puff into the lungs every 12 (twelve) hours. Dispense: 60 each;  Refill: 0  - PULMONARY - INTERNAL  - predniSONE 20 MG Oral Tab; Take 2 tablets once a day for 5 days  Dispense: 10 tablet; Refill: 0  - cimetidine 400 MG Oral Tab; Take 1 tablet (400 mg total) by mouth nightly. Dispense: 30 tablet; Refill: 1       The patient indicates understanding of these issues and agrees to the plan. The above note was creating using Dragon speech recognition technology. Please excuse any typos.

## 2023-11-03 ENCOUNTER — APPOINTMENT (OUTPATIENT)
Dept: GENERAL RADIOLOGY | Facility: HOSPITAL | Age: 28
End: 2023-11-03
Attending: EMERGENCY MEDICINE
Payer: COMMERCIAL

## 2023-11-03 ENCOUNTER — HOSPITAL ENCOUNTER (EMERGENCY)
Facility: HOSPITAL | Age: 28
Discharge: HOME | End: 2023-11-03
Attending: EMERGENCY MEDICINE
Payer: COMMERCIAL

## 2023-11-03 ENCOUNTER — NURSE TRIAGE (OUTPATIENT)
Dept: FAMILY MEDICINE CLINIC | Facility: CLINIC | Age: 28
End: 2023-11-03

## 2023-11-03 VITALS
HEIGHT: 64 IN | WEIGHT: 240 LBS | DIASTOLIC BLOOD PRESSURE: 73 MMHG | SYSTOLIC BLOOD PRESSURE: 130 MMHG | TEMPERATURE: 99 F | BODY MASS INDEX: 40.97 KG/M2 | RESPIRATION RATE: 19 BRPM | HEART RATE: 73 BPM | OXYGEN SATURATION: 98 %

## 2023-11-03 DIAGNOSIS — R05.3 CHRONIC COUGH: Primary | ICD-10-CM

## 2023-11-03 DIAGNOSIS — R07.89 CHEST PAIN, NON-CARDIAC: ICD-10-CM

## 2023-11-03 LAB
ALBUMIN SERPL-MCNC: 4.6 G/DL (ref 3.2–4.8)
ALBUMIN/GLOB SERPL: 1.6 {RATIO} (ref 1–2)
ALP LIVER SERPL-CCNC: 51 U/L
ALT SERPL-CCNC: 13 U/L
ANION GAP SERPL CALC-SCNC: 8 MMOL/L (ref 0–18)
AST SERPL-CCNC: 28 U/L (ref ?–34)
B-HCG UR QL: NEGATIVE
BASOPHILS # BLD AUTO: 0.03 X10(3) UL (ref 0–0.2)
BASOPHILS NFR BLD AUTO: 0.2 %
BILIRUB SERPL-MCNC: 0.4 MG/DL (ref 0.3–1.2)
BUN BLD-MCNC: 9 MG/DL (ref 9–23)
BUN/CREAT SERPL: 10.8 (ref 10–20)
CALCIUM BLD-MCNC: 9.8 MG/DL (ref 8.7–10.4)
CHLORIDE SERPL-SCNC: 110 MMOL/L (ref 98–112)
CO2 SERPL-SCNC: 22 MMOL/L (ref 21–32)
CREAT BLD-MCNC: 0.83 MG/DL
DEPRECATED RDW RBC AUTO: 41.1 FL (ref 35.1–46.3)
EGFRCR SERPLBLD CKD-EPI 2021: 97 ML/MIN/1.73M2 (ref 60–?)
EOSINOPHIL # BLD AUTO: 0 X10(3) UL (ref 0–0.7)
EOSINOPHIL NFR BLD AUTO: 0 %
ERYTHROCYTE [DISTWIDTH] IN BLOOD BY AUTOMATED COUNT: 13.5 % (ref 11–15)
GLOBULIN PLAS-MCNC: 2.9 G/DL (ref 2.8–4.4)
GLUCOSE BLD-MCNC: 168 MG/DL (ref 70–99)
HCT VFR BLD AUTO: 39.1 %
HGB BLD-MCNC: 13 G/DL
IMM GRANULOCYTES # BLD AUTO: 0.07 X10(3) UL (ref 0–1)
IMM GRANULOCYTES NFR BLD: 0.4 %
LYMPHOCYTES # BLD AUTO: 1.12 X10(3) UL (ref 1–4)
LYMPHOCYTES NFR BLD AUTO: 5.8 %
MCH RBC QN AUTO: 27.4 PG (ref 26–34)
MCHC RBC AUTO-ENTMCNC: 33.2 G/DL (ref 31–37)
MCV RBC AUTO: 82.5 FL
MONOCYTES # BLD AUTO: 0.67 X10(3) UL (ref 0.1–1)
MONOCYTES NFR BLD AUTO: 3.5 %
NEUTROPHILS # BLD AUTO: 17.36 X10 (3) UL (ref 1.5–7.7)
NEUTROPHILS # BLD AUTO: 17.36 X10(3) UL (ref 1.5–7.7)
NEUTROPHILS NFR BLD AUTO: 90.1 %
OSMOLALITY SERPL CALC.SUM OF ELEC: 293 MOSM/KG (ref 275–295)
PLATELET # BLD AUTO: 377 10(3)UL (ref 150–450)
POTASSIUM SERPL-SCNC: 4.4 MMOL/L (ref 3.5–5.1)
PROT SERPL-MCNC: 7.5 G/DL (ref 5.7–8.2)
RBC # BLD AUTO: 4.74 X10(6)UL
SODIUM SERPL-SCNC: 140 MMOL/L (ref 136–145)
WBC # BLD AUTO: 19.3 X10(3) UL (ref 4–11)

## 2023-11-03 PROCEDURE — 85025 COMPLETE CBC W/AUTO DIFF WBC: CPT

## 2023-11-03 PROCEDURE — 71045 X-RAY EXAM CHEST 1 VIEW: CPT | Performed by: EMERGENCY MEDICINE

## 2023-11-03 PROCEDURE — 36415 COLL VENOUS BLD VENIPUNCTURE: CPT

## 2023-11-03 PROCEDURE — 99285 EMERGENCY DEPT VISIT HI MDM: CPT

## 2023-11-03 PROCEDURE — 99283 EMERGENCY DEPT VISIT LOW MDM: CPT

## 2023-11-03 PROCEDURE — 80053 COMPREHEN METABOLIC PANEL: CPT

## 2023-11-03 PROCEDURE — 81025 URINE PREGNANCY TEST: CPT

## 2023-11-03 PROCEDURE — 85025 COMPLETE CBC W/AUTO DIFF WBC: CPT | Performed by: EMERGENCY MEDICINE

## 2023-11-03 PROCEDURE — 80053 COMPREHEN METABOLIC PANEL: CPT | Performed by: EMERGENCY MEDICINE

## 2023-11-03 NOTE — TELEPHONE ENCOUNTER
Action Requested: Summary for Provider     []  Critical Lab, Recommendations Needed  [] Need Additional Advice  [x]   FYI    []   Need Orders  [] Need Medications Sent to Pharmacy  []  Other     SUMMARY: Going to ER now     Reason for call: Worsening Symptoms - worsening shortness of breath  Onset: last night      Patient called office. Date of birth and full name both confirmed. She has been taking all medications as prescribed. Using rescue inhaler but it only helps temporarily. Even while taking now, presents with conversational dyspnea, patient reports wheezing. RN advised ER now, offered to call 911  Patient is unsure if she will get a ride, call 911 or drive self. RN advised again driving herself - but if she chooses to drive - be cautious , pull over if needed to call 911. RN advised ride or 911 to ER now. She verbalizes understanding.         Reason for Disposition   MODERATE difficulty breathing (e.g., speaks in phrases, SOB even at rest, pulse 100-120) of new-onset or worse than normal    Protocols used: Breathing Difficulty-A-OH

## 2023-11-03 NOTE — ED INITIAL ASSESSMENT (HPI)
Pt arrives through triage with complaints of sob/ chest tightness since Monday and also reports right lower abdominal pain. +D. Denies fevers     Recent diagnosed with Whooping cough with abx, finished course.

## 2023-11-04 ENCOUNTER — NURSE TRIAGE (OUTPATIENT)
Dept: FAMILY MEDICINE CLINIC | Facility: CLINIC | Age: 28
End: 2023-11-04

## 2023-11-04 NOTE — TELEPHONE ENCOUNTER
Patient called with a condition update. Patient went to ER yesterday and discharged home. Patient states she is still coughing, she may not feel up to going back to work Monday. 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 She was diagnosed with pertussis  10/24/23. She will call to update on Monday. Patient also questioning if prednisone can make the WBC and glucose levels increase. Patient advised yes that is true. Patient states ER doctor told her that and wanted to confirm.

## 2023-11-06 ENCOUNTER — OFFICE VISIT (OUTPATIENT)
Dept: FAMILY MEDICINE CLINIC | Facility: CLINIC | Age: 28
End: 2023-11-06
Payer: COMMERCIAL

## 2023-11-06 VITALS
BODY MASS INDEX: 41.15 KG/M2 | HEIGHT: 64 IN | WEIGHT: 241 LBS | SYSTOLIC BLOOD PRESSURE: 123 MMHG | HEART RATE: 73 BPM | DIASTOLIC BLOOD PRESSURE: 83 MMHG

## 2023-11-06 DIAGNOSIS — R05.3 CHRONIC COUGH: ICD-10-CM

## 2023-11-06 DIAGNOSIS — R35.0 URINARY FREQUENCY: Primary | ICD-10-CM

## 2023-11-06 DIAGNOSIS — R73.09 ELEVATED GLUCOSE: ICD-10-CM

## 2023-11-06 LAB
APPEARANCE: CLEAR
BILIRUBIN: NEGATIVE
CARTRIDGE LOT#: NORMAL NUMERIC
GLUCOSE (URINE DIPSTICK): NEGATIVE MG/DL
HEMOGLOBIN A1C: 4.9 % (ref 4.3–5.6)
KETONES (URINE DIPSTICK): NEGATIVE MG/DL
MULTISTIX LOT#: ABNORMAL NUMERIC
NITRITE, URINE: NEGATIVE
PH, URINE: 6.5 (ref 4.5–8)
PROTEIN (URINE DIPSTICK): NEGATIVE MG/DL
SPECIFIC GRAVITY: 1.01 (ref 1–1.03)
URINE-COLOR: YELLOW
UROBILINOGEN,SEMI-QN: 0.2 MG/DL (ref 0–1.9)

## 2023-11-06 PROCEDURE — 3008F BODY MASS INDEX DOCD: CPT | Performed by: PHYSICIAN ASSISTANT

## 2023-11-06 PROCEDURE — 3079F DIAST BP 80-89 MM HG: CPT | Performed by: PHYSICIAN ASSISTANT

## 2023-11-06 PROCEDURE — 99213 OFFICE O/P EST LOW 20 MIN: CPT | Performed by: PHYSICIAN ASSISTANT

## 2023-11-06 PROCEDURE — 3074F SYST BP LT 130 MM HG: CPT | Performed by: PHYSICIAN ASSISTANT

## 2023-11-06 PROCEDURE — 83036 HEMOGLOBIN GLYCOSYLATED A1C: CPT | Performed by: PHYSICIAN ASSISTANT

## 2023-11-06 PROCEDURE — 81002 URINALYSIS NONAUTO W/O SCOPE: CPT | Performed by: PHYSICIAN ASSISTANT

## 2023-11-06 RX ORDER — MONTELUKAST SODIUM 10 MG/1
10 TABLET ORAL DAILY
Qty: 90 TABLET | Refills: 3 | Status: SHIPPED | OUTPATIENT
Start: 2023-11-06 | End: 2024-10-31

## 2023-11-06 NOTE — TELEPHONE ENCOUNTER
Patient called, verified Name and . She states she was seen in ED on 11/3 for chronic cough. She is still coughing, productive with phlegm, which usually gets bad at night. She has been taking Mucinex which did not seem to be working. She states that for the past few days, she has been experiencing \"crazy thirst\" and has been drinking and urinating a lot as mouth and throat feels dry. Her mother checked her blood sugar last night and it was 193. She was prescribed prednisone and today is the last day she is taking it. Advised to schedule an appointment for followup. Patient verbalized understanding and agreed with plan of care. Appointment scheduled. Future Appointments   Date Time Provider Jasper Stiles   2023  9:30 AM Estela Moss PA-C [facility] [place]   2023 11:[facility]   11/15/2023 10:00 AM NORA Vivar [facility]   2023  8:30 AM Gregg Hicks MD [facility] [place]   2023 10:30 AM TIFFANY Collins [facility] OF THE [facility]     Patient reminded to wear a mask upon entry into the building and throughout the appointment, verbalized understanding.

## 2023-11-08 ENCOUNTER — TELEPHONE (OUTPATIENT)
Dept: FAMILY MEDICINE CLINIC | Facility: CLINIC | Age: 28
End: 2023-11-08

## 2023-11-08 NOTE — TELEPHONE ENCOUNTER
Patient still has a cough. She finished her steriods on Monday. She was told to redo her blood work on Thursday 11/9/23. She would like to know if she can get the blood work done today instead. Please advise.

## 2023-11-09 ENCOUNTER — HOSPITAL ENCOUNTER (OUTPATIENT)
Dept: GENERAL RADIOLOGY | Age: 28
Discharge: HOME | End: 2023-11-09
Attending: PHYSICIAN ASSISTANT
Payer: COMMERCIAL

## 2023-11-09 ENCOUNTER — LAB ENCOUNTER (OUTPATIENT)
Dept: LAB | Age: 28
End: 2023-11-09
Attending: FAMILY MEDICINE
Payer: COMMERCIAL

## 2023-11-09 DIAGNOSIS — R05.3 CHRONIC COUGH: ICD-10-CM

## 2023-11-09 LAB
ANION GAP SERPL CALC-SCNC: 4 MMOL/L (ref 0–18)
BASOPHILS # BLD AUTO: 0.11 X10(3) UL (ref 0–0.2)
BASOPHILS NFR BLD AUTO: 1.1 %
BUN BLD-MCNC: 15 MG/DL (ref 9–23)
BUN/CREAT SERPL: 17.4 (ref 10–20)
CALCIUM BLD-MCNC: 9.4 MG/DL (ref 8.7–10.4)
CHLORIDE SERPL-SCNC: 104 MMOL/L (ref 98–112)
CO2 SERPL-SCNC: 28 MMOL/L (ref 21–32)
CREAT BLD-MCNC: 0.86 MG/DL
DEPRECATED RDW RBC AUTO: 40.3 FL (ref 35.1–46.3)
EGFRCR SERPLBLD CKD-EPI 2021: 93 ML/MIN/1.73M2 (ref 60–?)
EOSINOPHIL # BLD AUTO: 0.45 X10(3) UL (ref 0–0.7)
EOSINOPHIL NFR BLD AUTO: 4.6 %
ERYTHROCYTE [DISTWIDTH] IN BLOOD BY AUTOMATED COUNT: 13.3 % (ref 11–15)
FASTING STATUS PATIENT QL REPORTED: NO
GLUCOSE BLD-MCNC: 86 MG/DL (ref 70–99)
HCT VFR BLD AUTO: 39.3 %
HGB BLD-MCNC: 13.1 G/DL
IMM GRANULOCYTES # BLD AUTO: 0.06 X10(3) UL (ref 0–1)
IMM GRANULOCYTES NFR BLD: 0.6 %
LYMPHOCYTES # BLD AUTO: 2.42 X10(3) UL (ref 1–4)
LYMPHOCYTES NFR BLD AUTO: 24.9 %
MCH RBC QN AUTO: 27.6 PG (ref 26–34)
MCHC RBC AUTO-ENTMCNC: 33.3 G/DL (ref 31–37)
MCV RBC AUTO: 82.7 FL
MONOCYTES # BLD AUTO: 0.57 X10(3) UL (ref 0.1–1)
MONOCYTES NFR BLD AUTO: 5.9 %
NEUTROPHILS # BLD AUTO: 6.1 X10 (3) UL (ref 1.5–7.7)
NEUTROPHILS # BLD AUTO: 6.1 X10(3) UL (ref 1.5–7.7)
NEUTROPHILS NFR BLD AUTO: 62.9 %
OSMOLALITY SERPL CALC.SUM OF ELEC: 282 MOSM/KG (ref 275–295)
PLATELET # BLD AUTO: 337 10(3)UL (ref 150–450)
POTASSIUM SERPL-SCNC: 4.2 MMOL/L (ref 3.5–5.1)
RBC # BLD AUTO: 4.75 X10(6)UL
SODIUM SERPL-SCNC: 136 MMOL/L (ref 136–145)
WBC # BLD AUTO: 9.7 X10(3) UL (ref 4–11)

## 2023-11-09 PROCEDURE — 80048 BASIC METABOLIC PNL TOTAL CA: CPT | Performed by: PHYSICIAN ASSISTANT

## 2023-11-09 PROCEDURE — 71046 X-RAY EXAM CHEST 2 VIEWS: CPT | Performed by: PHYSICIAN ASSISTANT

## 2023-11-09 PROCEDURE — 36415 COLL VENOUS BLD VENIPUNCTURE: CPT | Performed by: PHYSICIAN ASSISTANT

## 2023-11-09 PROCEDURE — 85025 COMPLETE CBC W/AUTO DIFF WBC: CPT | Performed by: PHYSICIAN ASSISTANT

## 2023-11-09 NOTE — TELEPHONE ENCOUNTER
Patient called. She already went to get her labs and xray done today. Patient will be contacted once results are in. Discussed humidifier/steam vapor to help relieve coughing fits. Patient verbalized understanding.

## 2023-11-15 ENCOUNTER — OFFICE VISIT (OUTPATIENT)
Dept: PULMONOLOGY | Facility: CLINIC | Age: 28
End: 2023-11-15

## 2023-11-15 VITALS
RESPIRATION RATE: 20 BRPM | OXYGEN SATURATION: 99 % | HEIGHT: 64 IN | BODY MASS INDEX: 41.97 KG/M2 | SYSTOLIC BLOOD PRESSURE: 127 MMHG | DIASTOLIC BLOOD PRESSURE: 84 MMHG | HEART RATE: 71 BPM | WEIGHT: 245.81 LBS

## 2023-11-15 DIAGNOSIS — R05.3 CHRONIC COUGH: Primary | ICD-10-CM

## 2023-11-15 DIAGNOSIS — J45.30 MILD PERSISTENT ASTHMA WITHOUT COMPLICATION: ICD-10-CM

## 2023-11-15 PROCEDURE — 99203 OFFICE O/P NEW LOW 30 MIN: CPT | Performed by: PHYSICIAN ASSISTANT

## 2023-11-15 PROCEDURE — 3008F BODY MASS INDEX DOCD: CPT | Performed by: PHYSICIAN ASSISTANT

## 2023-11-15 PROCEDURE — 3079F DIAST BP 80-89 MM HG: CPT | Performed by: PHYSICIAN ASSISTANT

## 2023-11-15 PROCEDURE — 3074F SYST BP LT 130 MM HG: CPT | Performed by: PHYSICIAN ASSISTANT

## 2023-11-15 RX ORDER — AZITHROMYCIN 250 MG/1
TABLET, FILM COATED ORAL
Qty: 6 TABLET | Refills: 0 | Status: SHIPPED | OUTPATIENT
Start: 2023-11-15

## 2023-11-15 NOTE — PATIENT INSTRUCTIONS
Continue omeprazole 40 mg but take twice daily. Take azithromycin x5 days. Continue Advair 1 puff twice daily. Continue albuterol 2 puffs every 6 hours as needed for shortness of breath and wheezing. Follow up with allergist.  Management of anxiety. Recommend influenza vaccine.

## 2023-11-28 ENCOUNTER — OFFICE VISIT (OUTPATIENT)
Dept: FAMILY MEDICINE CLINIC | Facility: CLINIC | Age: 28
End: 2023-11-28

## 2023-11-28 VITALS
RESPIRATION RATE: 19 BRPM | DIASTOLIC BLOOD PRESSURE: 75 MMHG | BODY MASS INDEX: 41.66 KG/M2 | WEIGHT: 244 LBS | HEIGHT: 64 IN | SYSTOLIC BLOOD PRESSURE: 107 MMHG | HEART RATE: 84 BPM

## 2023-11-28 DIAGNOSIS — R05.3 CHRONIC COUGH: ICD-10-CM

## 2023-11-28 PROCEDURE — 99213 OFFICE O/P EST LOW 20 MIN: CPT | Performed by: FAMILY MEDICINE

## 2023-11-28 PROCEDURE — 3078F DIAST BP <80 MM HG: CPT | Performed by: FAMILY MEDICINE

## 2023-11-28 PROCEDURE — 3008F BODY MASS INDEX DOCD: CPT | Performed by: FAMILY MEDICINE

## 2023-11-28 PROCEDURE — 3074F SYST BP LT 130 MM HG: CPT | Performed by: FAMILY MEDICINE

## 2023-11-28 RX ORDER — FLUTICASONE PROPIONATE AND SALMETEROL 500; 50 UG/1; UG/1
1 POWDER RESPIRATORY (INHALATION) 2 TIMES DAILY
Qty: 60 EACH | Refills: 0 | Status: SHIPPED | OUTPATIENT
Start: 2023-11-28 | End: 2023-12-28

## 2023-11-29 ENCOUNTER — OFFICE VISIT (OUTPATIENT)
Dept: OTOLARYNGOLOGY | Facility: CLINIC | Age: 28
End: 2023-11-29
Payer: COMMERCIAL

## 2023-11-29 DIAGNOSIS — K21.00 GASTROESOPHAGEAL REFLUX DISEASE WITH ESOPHAGITIS, UNSPECIFIED WHETHER HEMORRHAGE: ICD-10-CM

## 2023-11-29 DIAGNOSIS — U09.9 POST-COVID CHRONIC COUGH: ICD-10-CM

## 2023-11-29 DIAGNOSIS — R05.3 POST-COVID CHRONIC COUGH: ICD-10-CM

## 2023-11-29 DIAGNOSIS — R05.3 CHRONIC COUGH: Primary | ICD-10-CM

## 2023-11-29 PROCEDURE — 31575 DIAGNOSTIC LARYNGOSCOPY: CPT | Performed by: STUDENT IN AN ORGANIZED HEALTH CARE EDUCATION/TRAINING PROGRAM

## 2023-11-29 PROCEDURE — 99213 OFFICE O/P EST LOW 20 MIN: CPT | Performed by: STUDENT IN AN ORGANIZED HEALTH CARE EDUCATION/TRAINING PROGRAM

## 2023-11-29 NOTE — PROGRESS NOTES
Judie Paredes is a 25year old female. Chief Complaint   Patient presents with    Cough     Reports chronic cough X 2 months  Phlegm with cough        ASSESSMENT AND PLAN:   1. Chronic cough  25year-old with a post-COVID cough for about 2 months. She has a history of EOE as well that is somewhat resistant to treatment she is considering injections from an allergist for this. She is already about about 5 doses of steroids this year. She is on omeprazole 40 mg twice a day already. She denies any postnasal drip or allergy symptoms. She has seen in pulmonologist and given an inhaler. She had pulmonary function testing suggestive of possible asthma. Flexible laryngoscopy without any concerning findings of her vocal cords. No nodules or polyps or hemorrhage. Mild edema and erythema of the posterior larynx. No signs of sinusitis. Likely has a neurogenic aspect to this post-COVID cough. Discussed possibly starting gabapentin if this continues for the next month. Discussed that this would take titration and would take several months to take effect. In the meantime she will try humidification and steam inhalation and if she is interested in beginning the medication she is going to return. She should continue to work with her GI physician on her EOE and possible reflux component. Consult from Dr. Mendel Murillo regarding chronic cough    2. Post-COVID chronic cough      3. Gastroesophageal reflux disease with esophagitis, unspecified whether hemorrhage        The patient indicates understanding of these issues and agrees to the plan.       EXAM:   [facility] 10/08/2023     Pertinent exam findings may also be noted above in assessment and plan     System Details   Skin Inspection - Normal.   Constitutional Overall appearance - Normal.   Head/Face Symmetric, TMJ tenderness not present    Eyes EOMI, PERRL   Right ear:  Canal clear, TM intact, no KANDY   Left ear:  Canal clear, TM intact, no KANDY   Nose: Septum midline, inferior turbinates not enlarged, nasal valves without collapse    Oral cavity/Oropharynx: No lesions or masses on inspection or palpation, tonsils symmetric    Neck: Soft without LAD, thyroid not enlarged  Voice clear/ no stridor   Other:      Scopes and Procedures:       Flexible Laryngoscopy Procedure Note (26439)    Due to inability for adequate examination of the larynx and need for magnification to perform the examination, endoscopy was performed. Risks and benefits were discussed with patient/family and they have given verbal consent to proceed. Pre-operative Diagnosis:   1. Chronic cough    2. Post-COVID chronic cough    3. Gastroesophageal reflux disease with esophagitis, unspecified whether hemorrhage        Post-operative Diagnosis: Same    Procedure: Diagnostic flexible fiberoptic laryngoscopy    Anesthesia: Topical anesthetic Macy     Surgeon Sondra Sorensen MD    EBL: 0cc    Procedure Detail & Findings:     After placement of topical anesthetic intranasally the flexible laryngoscope was inserted into the nare and driven through the nasal cavity with no significant abnormal findings noted in the nasal cavities or nasopharynx. The oropharynx, hypopharynx and larynx were subsequently examined and the following findings were noted:        Base of Tongue: Normal        Valeculla: Normal        Arytenoids: Normal/Erythemous: Erythmous        Introitus of the esophagus: Normal        Epiglottis: Normal        False vocal cords: Normal        True vocal cords: Normal        Subglottic space: Normal        Mobility of True vocal cords: Normal    Condition: Stable    Complications: Patient tolerated the procedure well with no immediate complication. Tony Medley MD        Current Outpatient Medications   Medication Sig Dispense Refill    fluticasone-salmeterol 500-50 MCG/ACT Inhalation Aerosol Powder, Breath Activated Inhale 1 puff into the lungs 2 (two) times daily.  60 each 0    azithromycin 250 MG Oral Tab take 2 tablet (500MG)  by ORAL route  every day for 1 day then 1 tablet (250 mg) by oral route once daily for 4 days 6 tablet 0    montelukast (SINGULAIR) 10 MG Oral Tab Take 1 tablet (10 mg total) by mouth daily. 90 tablet 3    albuterol 108 (90 Base) MCG/ACT Inhalation Aero Soln Inhale 2 puffs into the lungs every 4 (four) hours as needed for Wheezing or Shortness of Breath. 18 g 0    Omeprazole 40 MG Oral Capsule Delayed Release Take 1 capsule (40 mg total) by mouth daily. Take 1 capsule by mouth daily before breakfast. 30 capsule 11    meclizine 25 MG Oral Tab Take 1 tablet (25 mg total) by mouth 3 (three) times daily as needed. 45 tablet 0    Multiple Vitamins-Minerals (MULTIVITAMIN ADULT OR) Take by mouth. Past Medical History:   Diagnosis Date    Asthma     COVID-19     Depression     Eosinophilic esophagitis     Esophageal reflux     Gastritis       Social History:  Social History     Socioeconomic History    Marital status: Single   Tobacco Use    Smoking status: Never    Smokeless tobacco: Never    Tobacco comments:     Mom smoked when she was growing up.      Vaping Use    Vaping Use: Never used   Substance and Sexual Activity    Alcohol use: Yes     Comment: occasional    Drug use: No          Ruthann Oropeza MD  11/29/2023  9:54 AM

## 2023-12-05 ENCOUNTER — TELEPHONE (OUTPATIENT)
Dept: GASTROENTEROLOGY | Facility: CLINIC | Age: 28
End: 2023-12-05

## 2023-12-05 DIAGNOSIS — K20.0 EOSINOPHILIC ESOPHAGITIS: Primary | ICD-10-CM

## 2023-12-05 NOTE — TELEPHONE ENCOUNTER
Patient was cancelled - please contact patient to reschedule the EGD     Orders below       egd w/ mac w/ dil w/ Dr. Mansoor Solorzano  Dx:  EoE

## 2023-12-05 NOTE — TELEPHONE ENCOUNTER
Scheduled for:  Razia Hutton [id number] with dilatation   Provider Name:    Date:  05/16/2024  Location:  [facility]  Sedation:  Mac  Time:  1:30pm (pt is aware to arrive at 12:30pm     Prep:  Npo  Meds/Allergies Reconciled?:  yes    Diagnosis with codes:   Eosinophilic esophagitis T67.7   Was patient informed to call insurance with codes (Y/N): Yes      Referral sent?:  Referral was sent at the time of electronic surgical scheduling. [address] [place] [address] or [place] notified?:  I sent an electronic request to Endo Scheduling and received a confirmation today. Medication Orders:  None  Misc Orders:  None     Further instructions given by staff:  I discussed the prep instructions with the patient which she verbally understood and is aware that I will sent the instructions today. via [employer]

## 2023-12-07 ENCOUNTER — HOSPITAL ENCOUNTER (OUTPATIENT)
Age: 28
Discharge: HOME | End: 2023-12-07
Payer: COMMERCIAL

## 2023-12-07 ENCOUNTER — APPOINTMENT (OUTPATIENT)
Dept: GENERAL RADIOLOGY | Age: 28
End: 2023-12-07
Attending: Physician Assistant
Payer: COMMERCIAL

## 2023-12-07 ENCOUNTER — NURSE TRIAGE (OUTPATIENT)
Dept: FAMILY MEDICINE CLINIC | Facility: CLINIC | Age: 28
End: 2023-12-07

## 2023-12-07 VITALS
SYSTOLIC BLOOD PRESSURE: 122 MMHG | HEART RATE: 92 BPM | RESPIRATION RATE: 18 BRPM | TEMPERATURE: 99 F | OXYGEN SATURATION: 98 % | DIASTOLIC BLOOD PRESSURE: 78 MMHG

## 2023-12-07 DIAGNOSIS — J45.901 EXACERBATION OF ASTHMA, UNSPECIFIED ASTHMA SEVERITY, UNSPECIFIED WHETHER PERSISTENT: ICD-10-CM

## 2023-12-07 DIAGNOSIS — J06.9 VIRAL UPPER RESPIRATORY ILLNESS: Primary | ICD-10-CM

## 2023-12-07 LAB
POCT INFLUENZA A: NEGATIVE
POCT INFLUENZA B: NEGATIVE
SARS-COV-2 RNA RESP QL NAA+PROBE: NOT DETECTED

## 2023-12-07 PROCEDURE — 71046 X-RAY EXAM CHEST 2 VIEWS: CPT | Performed by: PHYSICIAN ASSISTANT

## 2023-12-07 PROCEDURE — 87502 INFLUENZA DNA AMP PROBE: CPT | Performed by: PHYSICIAN ASSISTANT

## 2023-12-07 PROCEDURE — 99214 OFFICE O/P EST MOD 30 MIN: CPT

## 2023-12-07 PROCEDURE — 94640 AIRWAY INHALATION TREATMENT: CPT

## 2023-12-07 RX ORDER — PREDNISONE 20 MG/1
40 TABLET ORAL DAILY
Qty: 10 TABLET | Refills: 0 | Status: SHIPPED | OUTPATIENT
Start: 2023-12-07 | End: 2023-12-12

## 2023-12-07 RX ORDER — ALBUTEROL SULFATE 90 UG/1
2 AEROSOL, METERED RESPIRATORY (INHALATION) EVERY 4 HOURS PRN
Qty: 1 EACH | Refills: 0 | Status: SHIPPED | OUTPATIENT
Start: 2023-12-07 | End: 2024-01-06

## 2023-12-07 RX ORDER — BENZONATATE 200 MG/1
200 CAPSULE ORAL 3 TIMES DAILY PRN
Qty: 20 CAPSULE | Refills: 0 | Status: SHIPPED | OUTPATIENT
Start: 2023-12-07 | End: 2023-12-29

## 2023-12-07 RX ORDER — IPRATROPIUM BROMIDE AND ALBUTEROL SULFATE 2.5; .5 MG/3ML; MG/3ML
3 SOLUTION RESPIRATORY (INHALATION) ONCE
Status: COMPLETED | OUTPATIENT
Start: 2023-12-07 | End: 2023-12-07

## 2023-12-07 NOTE — DISCHARGE INSTRUCTIONS
Complete entire course of prednisone as directed   Use inhaler and nebulizer as needed for shortness of breath and/or wheezing  Benzonatate for cough as needed up to 3 times daily     Alternate Tylenol and Motrin every 3 hours for pain or fever > 100.4 degrees  Drink plenty of fluids   Get plenty of rest     You may benefit from taking a decongestant (e.g. Sudafed)  You may benefit from taking a daily allergy medication (e.g. Zyrtec)  You may benefit from using a humidifier    Sleep with head elevated and avoid laying flat  Avoid having air blow on your face    Wash hands often  Disinfect your environment  Do not share utensils or drinks    Symptoms may take a few weeks to resolve  Follow up with your primary care provider

## 2023-12-07 NOTE — TELEPHONE ENCOUNTER
Action Requested: Summary for Provider     []  Critical Lab, Recommendations Needed  [] Need Additional Advice  []   FYI    []   Need Orders  [] Need Medications Sent to Pharmacy  []  Other     SUMMARY: Painful, dry, occasional productive cough, runny nose, sinus pain/pressure that is constant. Mom admitted yesterday for RSV, asking if should be tested. Inhalers not working as does have asthma. Advised to go to Urgent Care. Patient agreed. Going to [facility]. Reason for call: Cough        Patient statis is not Not sleeping due to cough  Unsure if has fevers    Went over home care advice. Call back or go to Walk in care or Immediate care if new symptoms arise or if s/sx worsen or has questions or concerns. Patient verbalized understanding and agrees with plan.       Reason for Disposition   Patient sounds very sick or weak to the triager    Protocols used: Cough-A-OH

## 2023-12-07 NOTE — TELEPHONE ENCOUNTER
Patient (name and  verified) calling back after being in the IC today for RSV testing. Patient was seen in the IC dx with URI viral, neg for covid. Patient was instructed to follow symptoms management guidelines. Patient was instructed that if symptoms worsen to be seen in the ER/IC for evaluation. Verbalized understanding.

## 2023-12-10 ENCOUNTER — APPOINTMENT (OUTPATIENT)
Dept: GENERAL RADIOLOGY | Facility: HOSPITAL | Age: 28
End: 2023-12-10
Attending: EMERGENCY MEDICINE
Payer: COMMERCIAL

## 2023-12-10 ENCOUNTER — HOSPITAL ENCOUNTER (EMERGENCY)
Facility: HOSPITAL | Age: 28
Discharge: HOME | End: 2023-12-10
Attending: EMERGENCY MEDICINE
Payer: COMMERCIAL

## 2023-12-10 VITALS
HEART RATE: 83 BPM | WEIGHT: 244.06 LBS | RESPIRATION RATE: 18 BRPM | SYSTOLIC BLOOD PRESSURE: 140 MMHG | TEMPERATURE: 97 F | DIASTOLIC BLOOD PRESSURE: 78 MMHG | OXYGEN SATURATION: 100 % | BODY MASS INDEX: 41.67 KG/M2 | HEIGHT: 64 IN

## 2023-12-10 DIAGNOSIS — J06.9 UPPER RESPIRATORY TRACT INFECTION, UNSPECIFIED TYPE: Primary | ICD-10-CM

## 2023-12-10 LAB
FLUAV + FLUBV RNA SPEC NAA+PROBE: NEGATIVE
FLUAV + FLUBV RNA SPEC NAA+PROBE: NEGATIVE
RSV RNA SPEC NAA+PROBE: POSITIVE
SARS-COV-2 RNA RESP QL NAA+PROBE: NOT DETECTED

## 2023-12-10 PROCEDURE — 0241U SARS-COV-2/FLU A AND B/RSV BY PCR (GENEXPERT): CPT | Performed by: EMERGENCY MEDICINE

## 2023-12-10 PROCEDURE — 99284 EMERGENCY DEPT VISIT MOD MDM: CPT

## 2023-12-10 PROCEDURE — 71045 X-RAY EXAM CHEST 1 VIEW: CPT | Performed by: EMERGENCY MEDICINE

## 2023-12-10 PROCEDURE — 94644 CONT INHLJ TX 1ST HOUR: CPT

## 2023-12-10 PROCEDURE — 99283 EMERGENCY DEPT VISIT LOW MDM: CPT

## 2023-12-10 RX ORDER — AZITHROMYCIN 250 MG/1
TABLET, FILM COATED ORAL
Qty: 6 TABLET | Refills: 0 | Status: SHIPPED | OUTPATIENT
Start: 2023-12-10 | End: 2023-12-15

## 2023-12-10 RX ORDER — PREDNISONE 20 MG/1
60 TABLET ORAL DAILY
Qty: 15 TABLET | Refills: 0 | Status: SHIPPED | OUTPATIENT
Start: 2023-12-10 | End: 2023-12-15

## 2023-12-11 NOTE — ED INITIAL ASSESSMENT (HPI)
Pt reports cough for past few days, today c/o ARMIN w/ hx asthma, sts she woke up from her nap w/ blue lips and low spo2 which improved after pt sat up, and improved further after inhaler use. Denies hospitalizations d/t asthma. No further complaints. A/ox4, respirations unlabored, speech full/clear, gait steady, no acute distress noted.

## 2023-12-12 ENCOUNTER — OFFICE VISIT (OUTPATIENT)
Dept: FAMILY MEDICINE CLINIC | Facility: CLINIC | Age: 28
End: 2023-12-12

## 2023-12-12 ENCOUNTER — NURSE TRIAGE (OUTPATIENT)
Dept: FAMILY MEDICINE CLINIC | Facility: CLINIC | Age: 28
End: 2023-12-12

## 2023-12-12 VITALS
DIASTOLIC BLOOD PRESSURE: 115 MMHG | RESPIRATION RATE: 17 BRPM | HEART RATE: 75 BPM | WEIGHT: 239 LBS | BODY MASS INDEX: 40.8 KG/M2 | OXYGEN SATURATION: 99 % | SYSTOLIC BLOOD PRESSURE: 145 MMHG | HEIGHT: 64 IN

## 2023-12-12 DIAGNOSIS — J45.901 EXACERBATION OF ASTHMA, UNSPECIFIED ASTHMA SEVERITY, UNSPECIFIED WHETHER PERSISTENT: Primary | ICD-10-CM

## 2023-12-12 PROCEDURE — 3080F DIAST BP >= 90 MM HG: CPT | Performed by: FAMILY MEDICINE

## 2023-12-12 PROCEDURE — 99213 OFFICE O/P EST LOW 20 MIN: CPT | Performed by: FAMILY MEDICINE

## 2023-12-12 PROCEDURE — 3008F BODY MASS INDEX DOCD: CPT | Performed by: FAMILY MEDICINE

## 2023-12-12 PROCEDURE — 3077F SYST BP >= 140 MM HG: CPT | Performed by: FAMILY MEDICINE

## 2023-12-12 RX ORDER — CODEINE PHOSPHATE AND GUAIFENESIN 10; 100 MG/5ML; MG/5ML
10 SOLUTION ORAL EVERY 6 HOURS PRN
Qty: 236 ML | Refills: 0 | Status: SHIPPED | OUTPATIENT
Start: 2023-12-12

## 2023-12-12 NOTE — PROGRESS NOTES
Blood pressure (!) 145/115, pulse 75, resp. rate 17, height 5' 4\" (1.626 m), weight 239 lb (108.4 kg), last menstrual period 11/28/2023, SpO2 99%, not currently breastfeeding. 1 week history of cough keeping patient awake at night produces phlegm she does not know what color clear nasal discharge. Some difficulty breathing. Denies fever denies chills. No sexual activity for 2 years. Also with a sore throat. Using her albuterol inhaler and Advair inhaler. Some sneezing no watery or itchy eyes. No bad breath some facial pressure. Using prednisone and azithromycin. Was seen in the emergency days ago. Objective patient with coughing paroxysms during exam lungs otherwise clear to auscultation no rales rhonchi or wheezes throat clear nonerythematous ears with TMs intact no redness    Assessment asthma exacerbation with RSV    Plan continue prednisone and inhalers    Cheratussin AC    RISKS AND BENEFITS EXPLAINED. MEDICATION MAY CAUSE DROWSINESS. DO NOT DRIVE OR OPERATE HEAVY MACHINERY. GO TO ER IF SYMPTOMS PERSIST OR WORSEN.

## 2023-12-12 NOTE — TELEPHONE ENCOUNTER
Action Requested: Summary for Provider     []  Critical Lab, Recommendations Needed  [] Need Additional Advice  []   FYI    []   Need Orders  [] Need Medications Sent to Pharmacy  []  Other     SUMMARY: Per protocol: OV    Future Appointments   Date Time Provider Jasper Stiles   12/12/2023  3:50 PM Trini Olsen DO [facility] [place]   12/18/2023 11:00 AM ASHU [facility] [place]   1/23/2024  9:30 AM TIFFANY [facility]   2/20/2024  9:45 AM Mago Ontiveros MD [facility] [place]   5/16/2024  1:30 PM ZUNILDA, PROCEDURE [address] None     Reason for call: RSV  Onset: Data Unavailable    Patient was in ER on Sunday for RSV. She is still slightly short of breath and has a cough. She has been using her inhale four times a day. She has a history of asthma. Her ears are also popping.      Reason for Disposition   MILD difficulty breathing (e.g., minimal/no SOB at rest, SOB with walking, pulse < 100) of new-onset or worse than normal    Protocols used: Breathing Difficulty-A-OH

## 2023-12-13 ENCOUNTER — TELEPHONE (OUTPATIENT)
Dept: FAMILY MEDICINE CLINIC | Facility: CLINIC | Age: 28
End: 2023-12-13

## 2023-12-13 NOTE — TELEPHONE ENCOUNTER
Patient calling (identified name and ) states she was seen yesterday and is still feeling miserable with her symptoms. Does not think she would be able to return to work on . Asking if her note can be extended for her to return to work on . Note pended for review and signing if appropriate.

## 2023-12-14 NOTE — TELEPHONE ENCOUNTER
Patient to go to emergency room if she is having difficulty breathing.   Otherwise she is to schedule appointment to see me for her regular PCP in office tomorrow

## 2023-12-14 NOTE — TELEPHONE ENCOUNTER
Spoke with patient (name and  verified). Dr. Tima Colon' recommendations discussed. Patient verbalized understanding and agrees with plan.        Future Appointments   Date Time Provider Jasper Stiles   12/15/2023  8:50 AM Katia Haywood DO [facility] [place]   2023 11:00 AM [facility]ne Server[facility] [place]   2024  9:30 AM TIFFANY [facility]   2024  9:45 AM [facility]   2024  1:30 PM MIKE MAURO [id number] None

## 2023-12-15 ENCOUNTER — OFFICE VISIT (OUTPATIENT)
Dept: FAMILY MEDICINE CLINIC | Facility: CLINIC | Age: 28
End: 2023-12-15
Payer: COMMERCIAL

## 2023-12-15 VITALS
WEIGHT: 240 LBS | HEART RATE: 76 BPM | BODY MASS INDEX: 40.97 KG/M2 | HEIGHT: 64 IN | RESPIRATION RATE: 12 BRPM | SYSTOLIC BLOOD PRESSURE: 123 MMHG | OXYGEN SATURATION: 99 % | DIASTOLIC BLOOD PRESSURE: 76 MMHG

## 2023-12-15 DIAGNOSIS — J01.90 ACUTE NON-RECURRENT SINUSITIS, UNSPECIFIED LOCATION: Primary | ICD-10-CM

## 2023-12-15 PROCEDURE — 3074F SYST BP LT 130 MM HG: CPT | Performed by: FAMILY MEDICINE

## 2023-12-15 PROCEDURE — 99213 OFFICE O/P EST LOW 20 MIN: CPT | Performed by: FAMILY MEDICINE

## 2023-12-15 PROCEDURE — 3008F BODY MASS INDEX DOCD: CPT | Performed by: FAMILY MEDICINE

## 2023-12-15 PROCEDURE — 3078F DIAST BP <80 MM HG: CPT | Performed by: FAMILY MEDICINE

## 2023-12-15 RX ORDER — LEVOFLOXACIN 750 MG/1
750 TABLET, FILM COATED ORAL DAILY
Qty: 5 TABLET | Refills: 0 | Status: SHIPPED | OUTPATIENT
Start: 2023-12-15 | End: 2023-12-20

## 2023-12-15 NOTE — PROGRESS NOTES
Blood pressure 123/76, pulse 76, resp. rate 12, height 5' 4\" (1.626 m), weight 240 lb (108.9 kg), last menstrual period 11/28/2023, SpO2 99%, not currently breastfeeding. Following up for continued cough. Sleeping better with the Cheratussin AC. Still with some dyspnea complaining of fatigue. Yellow nasal congestion and sinus pressure. Last dose of Zithromax is today. Objective lungs clear to auscultation but continues to have coughing paroxysms. Ears with TMs intact no redness. Throat with cobblestoning no exudate    Assessment sinusitis history of asthma    Plan continue inhalers complete prednisone start Levaquin follow-up in 3 days if no improvement    GO TO ER IF SYMPTOMS PERSIST OR WORSEN.

## 2023-12-29 ENCOUNTER — NURSE TRIAGE (OUTPATIENT)
Dept: FAMILY MEDICINE CLINIC | Facility: CLINIC | Age: 28
End: 2023-12-29

## 2023-12-29 ENCOUNTER — OFFICE VISIT (OUTPATIENT)
Dept: FAMILY MEDICINE CLINIC | Facility: CLINIC | Age: 28
End: 2023-12-29

## 2023-12-29 ENCOUNTER — HOSPITAL ENCOUNTER (OUTPATIENT)
Dept: GENERAL RADIOLOGY | Age: 28
Discharge: HOME | End: 2023-12-29
Attending: FAMILY MEDICINE
Payer: COMMERCIAL

## 2023-12-29 VITALS
SYSTOLIC BLOOD PRESSURE: 119 MMHG | HEIGHT: 64 IN | HEART RATE: 80 BPM | DIASTOLIC BLOOD PRESSURE: 76 MMHG | BODY MASS INDEX: 42.85 KG/M2 | OXYGEN SATURATION: 98 % | WEIGHT: 251 LBS | RESPIRATION RATE: 16 BRPM

## 2023-12-29 DIAGNOSIS — R05.1 ACUTE COUGH: ICD-10-CM

## 2023-12-29 DIAGNOSIS — R05.3 CHRONIC COUGH: ICD-10-CM

## 2023-12-29 DIAGNOSIS — R05.1 ACUTE COUGH: Primary | ICD-10-CM

## 2023-12-29 DIAGNOSIS — R06.02 SHORTNESS OF BREATH: ICD-10-CM

## 2023-12-29 LAB
CONTROL LINE PRESENT WITH A CLEAR BACKGROUND (YES/NO): YES YES/NO
COVID19 BINAX NOW ANTIGEN: NOT DETECTED
KIT LOT #: 7926 NUMERIC
OPERATOR ID: NORMAL
STREP GRP A CUL-SCR: NEGATIVE

## 2023-12-29 PROCEDURE — 71046 X-RAY EXAM CHEST 2 VIEWS: CPT | Performed by: FAMILY MEDICINE

## 2023-12-29 PROCEDURE — 3074F SYST BP LT 130 MM HG: CPT | Performed by: FAMILY MEDICINE

## 2023-12-29 PROCEDURE — 94640 AIRWAY INHALATION TREATMENT: CPT | Performed by: FAMILY MEDICINE

## 2023-12-29 PROCEDURE — 3078F DIAST BP <80 MM HG: CPT | Performed by: FAMILY MEDICINE

## 2023-12-29 PROCEDURE — 99214 OFFICE O/P EST MOD 30 MIN: CPT | Performed by: FAMILY MEDICINE

## 2023-12-29 PROCEDURE — 3008F BODY MASS INDEX DOCD: CPT | Performed by: FAMILY MEDICINE

## 2023-12-29 PROCEDURE — 87880 STREP A ASSAY W/OPTIC: CPT | Performed by: FAMILY MEDICINE

## 2023-12-29 RX ORDER — OMEPRAZOLE 40 MG/1
40 CAPSULE, DELAYED RELEASE ORAL DAILY
Qty: 30 CAPSULE | Refills: 11 | Status: SHIPPED | OUTPATIENT
Start: 2023-12-29

## 2023-12-29 RX ORDER — IPRATROPIUM BROMIDE AND ALBUTEROL SULFATE 2.5; .5 MG/3ML; MG/3ML
3 SOLUTION RESPIRATORY (INHALATION) ONCE
Status: COMPLETED | OUTPATIENT
Start: 2023-12-29 | End: 2023-12-29

## 2023-12-29 RX ORDER — FLUTICASONE PROPIONATE AND SALMETEROL XINAFOATE 230; 21 UG/1; UG/1
2 AEROSOL, METERED RESPIRATORY (INHALATION) 2 TIMES DAILY
Qty: 1 EACH | Refills: 1 | Status: SHIPPED | OUTPATIENT
Start: 2023-12-29 | End: 2023-12-29 | Stop reason: DRUGHIGH

## 2023-12-29 RX ORDER — PREDNISONE 20 MG/1
20 TABLET ORAL 3 TIMES DAILY
Qty: 12 TABLET | Refills: 0 | Status: SHIPPED | OUTPATIENT
Start: 2023-12-29

## 2023-12-29 RX ORDER — ALBUTEROL SULFATE 90 UG/1
2 AEROSOL, METERED RESPIRATORY (INHALATION) EVERY 4 HOURS PRN
Qty: 18 G | Refills: 1 | Status: SHIPPED | OUTPATIENT
Start: 2023-12-29

## 2023-12-29 RX ORDER — FLUTICASONE PROPIONATE AND SALMETEROL 500; 50 UG/1; UG/1
1 POWDER RESPIRATORY (INHALATION) 2 TIMES DAILY
Qty: 1 EACH | Refills: 1 | Status: SHIPPED | OUTPATIENT
Start: 2023-12-29 | End: 2024-01-28

## 2023-12-29 RX ADMIN — IPRATROPIUM BROMIDE AND ALBUTEROL SULFATE 3 ML: 2.5; .5 SOLUTION RESPIRATORY (INHALATION) at 14:47:00

## 2023-12-29 NOTE — PROGRESS NOTES
Blood pressure 119/76, pulse 80, resp. rate 16, height 5' 4\" (1.626 m), weight 251 lb (113.9 kg), last menstrual period 11/28/2023, SpO2 98%, not currently breastfeeding. 3-day history of difficulty breathing history of asthma. Has a dog no smoking. Mother Susan Mims. Has been using inhalers without relief. No nasal congestion no sneezing no watery eyes. Bilateral ear discomfort. Sore throat. Also no bad breath no tooth pain some facial pressure and chest pain at night. Objective patient with coughing paroxysms during exam throat clear nonerythematous rapid strep negative. Ears with TMs intact no redness tenderness noted bilateral TMJ assessment asthma exacerbation    Plan DuoNebs in office COVID-negative    Strep swab    Refilled omeprazole    Prednisone prescription  STAT CXR    Advair prescription sent    GO TO ER IF SYMPTOMS PERSIST OR WORSEN.

## 2023-12-29 NOTE — TELEPHONE ENCOUNTER
Patient called, verified Name and . States testing negative for Covid. Reports that she can tell if her O2 saturation is low, currently showing 87% but goes up in the 90's. Advised that if she is experiencing shortness of breath or difficulty of breathing, she should not hesitate to go to ED. Verbalized understanding. She will monitor her symptoms.     Future Appointments   Date Time Provider Jasper Stiles   2023  2:10 PM Dolly Mccray, [facility] [place]

## 2023-12-29 NOTE — TELEPHONE ENCOUNTER
Please reply to pool: EM RN TRIAGE  Action Requested: Summary for Provider     []  Critical Lab, Recommendations Needed  [] Need Additional Advice  [x]   FYI    []   Need Orders  [] Need Medications Sent to Pharmacy  []  Other     SUMMARY: Patient contacts clinic reporting cough, wheezing and O2 desaturation yesterday. O2 went to 84% yesterday. She used her inhaler and it came up to 90%. She is 98% today but has increased her inhaler use. Denies fever. Some lightheadedness. She has not checked for covid. RN advised home covid test, call if positive result. Acute visit booked today. Patient to don mask prior to entering building. She verbalized understanding and compliance.     Reason for call: Cough  Onset: Data Unavailable                       Reason for Disposition   MILD difficulty breathing (e.g., minimal/no SOB at rest, SOB with walking, pulse <100) and still present when not coughing    Protocols used: Cough-A-OH

## 2023-12-29 NOTE — TELEPHONE ENCOUNTER
Patient states she was seen in the office today by Adonay Hong and she was prescribed a different dose of the Advair inhaler she normally takes the 500-50 mcg/act dose. Patient also requesting albuterol inhaler. Medications pended.

## 2023-12-30 LAB — SARS-COV-2 RNA RESP QL NAA+PROBE: NOT DETECTED

## 2023-12-30 RX ORDER — FLUTICASONE PROPIONATE AND SALMETEROL 500; 50 UG/1; UG/1
1 POWDER RESPIRATORY (INHALATION) 2 TIMES DAILY
Refills: 0 | OUTPATIENT
Start: 2023-12-30

## 2024-01-02 ENCOUNTER — TELEPHONE (OUTPATIENT)
Dept: FAMILY MEDICINE CLINIC | Facility: CLINIC | Age: 29
End: 2024-01-02

## 2024-01-02 NOTE — TELEPHONE ENCOUNTER
----- Message from Radha Prieto PA-C sent at 12/30/2023  7:35 PM CST -----  Covid 19 is negative.

## 2024-01-30 ENCOUNTER — OFFICE VISIT (OUTPATIENT)
Facility: CLINIC | Age: 29
End: 2024-01-30
Payer: COMMERCIAL

## 2024-01-30 VITALS
DIASTOLIC BLOOD PRESSURE: 69 MMHG | SYSTOLIC BLOOD PRESSURE: 129 MMHG | WEIGHT: 254 LBS | HEART RATE: 67 BPM | HEIGHT: 64 IN | BODY MASS INDEX: 43.36 KG/M2

## 2024-01-30 DIAGNOSIS — K20.0 EOSINOPHILIC ESOPHAGITIS: Primary | ICD-10-CM

## 2024-01-30 PROCEDURE — 3008F BODY MASS INDEX DOCD: CPT | Performed by: NURSE PRACTITIONER

## 2024-01-30 PROCEDURE — 3078F DIAST BP <80 MM HG: CPT | Performed by: NURSE PRACTITIONER

## 2024-01-30 PROCEDURE — 3074F SYST BP LT 130 MM HG: CPT | Performed by: NURSE PRACTITIONER

## 2024-01-30 PROCEDURE — 99215 OFFICE O/P EST HI 40 MIN: CPT | Performed by: NURSE PRACTITIONER

## 2024-01-30 NOTE — PROGRESS NOTES
[facility] - Gastroenterology                                                                                                               Reason for consult: eval    Requesting physician or provider: Marizol Mosquera MD    Chief Complaint   Patient presents with    Follow - Up       HPI:   Krupa Stock is a 28 year old year-old female with history of covid-19, depression, EoE, gerd, gastritis:      she is here today for f/u    she is here today for f/u EoE s/p balloon dilation x 2  treated with ppi and topical steroids  did not tolerate swallowed Flovent - had upset stomach with use     Eval with Allergy 2021-     Reviewed potential empiric trial of avoidance of milk and wheat x 4-6 weeks  even if skin testing is negative to food allergens if EOE symptoms persist as these are common non-IgE mediated culprits in regards to immune reactions to foods and eosinophilic esophagitis  Reviewed anti-GERD measures     She has not tried elimination diets.     She had been on pantoprazole. She says was told take it bid. She forgets and takes it once daily. She thinks still has regular gerd sx and intermittent dysphagia with solids.       Sh says after dilation has had sensitivity in her throat to very hot and cold foods. She is not sure if dilation improved dysphagia.      Had covid early April. Had vomiting w/ illness  Seems like reflux and dysphagia were slightly worse after illness    She was given pulmicort respule after last visit, but stopped therapy bc she says made her throat burn.    Had covid for 2nd time sept 2023, 3rd time oct 2023  Reflux dysphagia worse with sx  Also had strep, pertussis, rsv    PFT suggestive of asthma   eval w/ pulm  Has been using breo, albuterol  Not taking singulair    ENT eval   Seeing allergy - has upcoming appt    She reports running out of pantoprazole and was off therapy for approx 1 mos.  Use prior to that was approx 1x/week.   She started omeprazole approx 1 mos ago and has been taking therapy daily without noticeable improvement in her sx of gerd, dysphagia with solids like bread hamburger.  she denies abdominal pain. she denies nausea and/or vomiting.  she denies recent change in appetite and/or unintentional weight loss.     she moves her bowels daily and without recent change. she denies straining and/or incomplete evacuation.  she denies brbpr and/or melena.     Her maternal uncle had NHL  Maternal grandfather and maternal great grandfather has \"lazy epiglottis\"  She denies a FH GI malignancy, ibd celiac     No history of adverse reaction to sedation  No LANCE  No anticoagulants  No pacemaker/defibrillator  No pain medications and/or sleep aides        Last colonoscopy: no    EGD 2/2021  She had distal esophageal stricture dilated to 15 mm w/ TTS balloon. Path c/w EoE and advised to continue pantoprazole.     PATH  Distal esophagus up to 10 eos per HPF  Proximal esophagus up to 40 eos per HPF  Neg HP     EGD 5/2021  Narrowing through mid and distal esophagus   S/p balloon dilation up to 16.5 mm     PATH  Distal esophagus up to 20 eos per HPF  Mid esophagus up to 40 eos per HPF    Wt Readings from Last 6 Encounters:   01/30/24 254 lb (115.2 kg)   12/29/23 251 lb (113.9 kg)   12/15/23 240 lb (108.9 kg)   12/12/23 239 lb (108.4 kg)   12/10/23 244 lb 0.8 oz (110.7 kg)   11/28/23 244 lb (110.7 kg)        History, Medications, Allergies, ROS:      Past Medical History:   Diagnosis Date    Asthma     COVID-19     Depression     Eosinophilic esophagitis     Esophageal reflux     Gastritis       Past Surgical History:   Procedure Laterality Date    CHOLECYSTECTOMY  12/1/15    GASTRO - INTERNAL  02/2021    OTHER      left ankle    REMOVAL GALLBLADDER        Family Hx:   Family History   Problem Relation Age of Onset    Diabetes Mother         type 1    Other (Other) Mother     Hypertension Maternal Grandmother     Hypertension Maternal  Grandfather       Social History:   Social History     Socioeconomic History    Marital status: Single   Tobacco Use    Smoking status: Never    Smokeless tobacco: Never    Tobacco comments:     Mom smoked when she was growing up.     Vaping Use    Vaping Use: Never used   Substance and Sexual Activity    Alcohol use: Yes     Comment: occasional    Drug use: No        Medications (Active prior to today's visit):  Current Outpatient Medications   Medication Sig Dispense Refill    collagenase 250 UNIT/GM External Ointment Apply topically daily.      fluticasone furoate-vilanterol (BREO ELLIPTA) 200-25 MCG/ACT Inhalation Aerosol Powder, Breath Activated Inhale 1 puff into the lungs daily. 60 each 1    Omeprazole 40 MG Oral Capsule Delayed Release Take 1 capsule (40 mg total) by mouth daily. Take 1 capsule by mouth daily before breakfast. 30 capsule 11    albuterol 108 (90 Base) MCG/ACT Inhalation Aero Soln Inhale 2 puffs into the lungs every 4 (four) hours as needed for Wheezing or Shortness of Breath. 18 g 1    montelukast (SINGULAIR) 10 MG Oral Tab Take 1 tablet (10 mg total) by mouth daily. 90 tablet 3    Multiple Vitamins-Minerals (MULTIVITAMIN ADULT OR) Take by mouth.      predniSONE 20 MG Oral Tab Take 1 tablet (20 mg total) by mouth in the morning, at noon, and at bedtime. (Patient not taking: Reported on 1/30/2024) 12 tablet 0    guaiFENesin-codeine (CHERATUSSIN AC) 100-10 MG/5ML Oral Solution Take 10 mL by mouth every 6 (six) hours as needed. (Patient not taking: Reported on 12/29/2023) 236 mL 0    meclizine 25 MG Oral Tab Take 1 tablet (25 mg total) by mouth 3 (three) times daily as needed. (Patient not taking: Reported on 1/30/2024) 45 tablet 0       Allergies:  Allergies   Allergen Reactions    Dust Mite Extract ANAPHYLAXIS    Cephalexin RASH and ITCHING    Penicillins RASH       ROS:   CONSTITUTIONAL: negative for fevers, chills, sweats and weight loss  EYES Negative for red eyes, yellow eyes, changes in  vision  HEENT: Positive for dysphagia and negative for hoarseness  RESPIRATORY: Negative for cough and shortness of breath  CARDIOVASCULAR: Negative for chest pain, palpitations  GASTROINTESTINAL: See HPI  GENITOURINARY: Negative for dysuria and frequency  MUSCULOSKELETAL: Negative for arthralgias and myalgias  NEUROLOGICAL: Negative for dizziness and headaches  BEHAVIOR/PSYCH: Negative for anxiety and poor appetite    PHYSICAL EXAM:   Blood pressure 129/69, pulse 67, height 5' 4\" (1.626 m), weight 254 lb (115.2 kg), last menstrual period 01/21/2024, not currently breastfeeding.    GEN: WD/WN, NAD  HEENT: Supple symmetrical, trachea midline  CV: RRR, the extremities are warm and well perfused   LUNGS: No increased work of breathing  ABDOMEN: No scars, normal bowel sounds, soft, non-tender, non-distended no rebound or guarding, no masses, no hepatomegaly  MSK: No redness, no warmth, no swelling of joints  SKIN: No jaundice, no erythema, no rashes  HEMATOLOGIC: No bleeding, no bruising  NEURO: Alert and interactive, normal gait    Labs/Imaging/Procedures:     Patient's pertinent labs and imaging were reviewed and discussed with patient today.        .  ASSESSMENT/PLAN:   Krupa Stock is a 28 year old year-old female with history of covid-19, depression, EoE, gerd, gastritis:     #eoe  She is here today for f/u EoE s/p balloon dilation x 2  treated with ppi and topical steroids.  Sx not improved with PPI, however has lapses in therapy and non-compliance.  She did not tolerate swallowed Flovent - had upset stomach with use.  She reports burning in her esophagus was worse with pulmicort respule.  She is currently on omeprazole 40 mg/daily and has on-going c/o gerd, dysphagia with solids.    Recommend:  Omeprazole twice daily  Reflux diet modification  Avoid hard solids  Extra care with chewing swallowing  Follow with asthma, allergy  Consider elimination diet  Repeat egd  Consider dupixent if failure to ppi and  could not tolerate topical steroid    Egd w/ mac w/ Dr. Ureña w/ possible dil  Dx: EoE       Orders This Visit:  No orders of the defined types were placed in this encounter.      Meds This Visit:  Requested Prescriptions      No prescriptions requested or ordered in this encounter       Imaging & Referrals:  None    ENDOSCOPIC RISK BENEFIT DISCUSSION: I described the procedure in great detail with the patient. I discussed the risks and benefits, including but not limited to: bleeding, perforation, infection, anesthesia complications, and even death. Patient will be NPO after midnight and will have a person physically present at time of pick-up to drive patient home. Patient verbalized understanding and agrees to proceed with procedure as planned.    Mari Jones, APRN   1/30/2024        This note was partially prepared using [employer] voice recognition dictation software. As a result, errors may occur. When identified, these errors have been corrected. While every attempt is made to correct errors during dictation, discrepancies may still exist.

## 2024-01-30 NOTE — PATIENT INSTRUCTIONS
Recommend:  Omeprazole twice daily  Reflux diet modification  Avoid hard solids  Extra care with chewing swallowing  Follow with asthma, allergy  Repeat egd  Consider dupixent if failure to ppi and could not tolerate topical steroid    Egd w/ harpreet w/ Dr. Ureña w/ possible dil  Dx: EoE       Tips to Control Acid Reflux    To control acid reflux, you’ll need to make some basic diet and lifestyle changes. The simple steps outlined below may be all you’ll need to ease discomfort.   Watch what you eat  Don't have fatty foods or spicy foods.  Eat fewer acidic foods, such as citrus and tomato-based foods. These can increase symptoms.  Limit drinking alcohol, caffeine, and fizzy beverages. All increase acid reflux.  Try limiting chocolate, peppermint, and spearmint. These can make acid reflux worse in some people.     Watch when you eat  Don't lie down for 3 hours after eating.  Don't snack before going to bed.     Raise your head  Raising your head and upper body by 4 to 6 inches helps limit reflux when you’re lying down. Put blocks under the head of your bed frame or a wedge under your mattress to raise it.   Other changes  Lose weight, if you need to  Don’t exercise near bedtime  Don't wear tight-fitting clothes  Limit aspirin and ibuprofen  Stop smoking     [facility] last reviewed this educational content on 6/1/2019 © 2000-2020 [employer], "[employer] [address] [place] [address], [place] [address]. All rights reserved. This information is not intended as a substitute for professional medical care. Always follow your healthcare professional's instructions.

## 2024-02-14 ENCOUNTER — NURSE TRIAGE (OUTPATIENT)
Dept: FAMILY MEDICINE CLINIC | Facility: CLINIC | Age: 29
End: 2024-02-14

## 2024-02-14 NOTE — TELEPHONE ENCOUNTER
Action Requested: Summary for Provider     []  Critical Lab, Recommendations Needed  [] Need Additional Advice  [x]   FYI    []   Need Orders  [] Need Medications Sent to Pharmacy  []  Other     SUMMARY: Patient stated that for the last 1 week has been feeling tired and lightheaded. This morning was cooking and felt lightheaded like she was going to pass out, but did not. Things were not spinning.  Was pale. Did not check blood pressure. Patient laid down and took a nap and felt better. No longer feeling light headed. Still feels tired though. Eating and drinking regularly. No other symptoms.  Currently driving to work. Work till 11pm, so declined appointment for today. No appointments available with Dr Ruth till March.  Appointment made for tomorrow at 8:50am with Dr Mckeon in [place].   Advised patient to continue to drink fluids. Patient agreed.   Reason for call: Tired (Tired, lightheaded)  Onset: Feb 7, 2024  Reason for Disposition   MILD weakness (i.e., does not interfere with ability to work, go to school, normal activities) and persists > 1 week    Protocols used: Weakness (Generalized) and Fatigue-A-OH

## 2024-02-15 ENCOUNTER — OFFICE VISIT (OUTPATIENT)
Dept: FAMILY MEDICINE CLINIC | Facility: CLINIC | Age: 29
End: 2024-02-15
Payer: COMMERCIAL

## 2024-02-15 VITALS
HEART RATE: 78 BPM | SYSTOLIC BLOOD PRESSURE: 117 MMHG | WEIGHT: 252 LBS | HEIGHT: 64 IN | DIASTOLIC BLOOD PRESSURE: 79 MMHG | BODY MASS INDEX: 43.02 KG/M2

## 2024-02-15 DIAGNOSIS — R53.83 FATIGUE, UNSPECIFIED TYPE: Primary | ICD-10-CM

## 2024-02-15 PROCEDURE — 99213 OFFICE O/P EST LOW 20 MIN: CPT | Performed by: FAMILY MEDICINE

## 2024-02-15 RX ORDER — MONTELUKAST SODIUM 10 MG/1
10 TABLET ORAL NIGHTLY
Qty: 90 TABLET | Refills: 1 | Status: SHIPPED | OUTPATIENT
Start: 2024-02-15 | End: 2024-08-13

## 2024-02-15 RX ORDER — LEVOCETIRIZINE DIHYDROCHLORIDE 5 MG/1
5 TABLET, FILM COATED ORAL EVERY MORNING
Qty: 90 TABLET | Refills: 3 | Status: SHIPPED | OUTPATIENT
Start: 2024-02-15

## 2024-02-15 RX ORDER — ALBUTEROL SULFATE 90 UG/1
2 AEROSOL, METERED RESPIRATORY (INHALATION) EVERY 6 HOURS PRN
Qty: 8.5 G | Refills: 5 | Status: SHIPPED | OUTPATIENT
Start: 2024-02-15 | End: 2025-02-14

## 2024-02-15 RX ORDER — FLUTICASONE FUROATE AND VILANTEROL 200; 25 UG/1; UG/1
1 POWDER RESPIRATORY (INHALATION) DAILY
Qty: 60 EACH | Refills: 1 | Status: SHIPPED | OUTPATIENT
Start: 2024-02-15

## 2024-02-15 NOTE — PROGRESS NOTES
Blood pressure 117/79, pulse 78, height 5' 4\" (1.626 m), weight 252 lb (114.3 kg), last menstrual period 01/21/2024, not currently breastfeeding.      Patient presents today following up for fatigue.  Sleeps from 5 to 8 hours nightly works p.m. shift as a CNA.  No children she has a dog.    Recent asthma exacerbation.  Has an allergist appointment scheduled for February 17.  She reports that she  does not cough at night.  Some difficulty when she takes a deep breath.  Menstrual flow 5 to 6 days monthly.    Objective lungs clear to auscultation without rales rhonchi or wheezes    Heart regular rate rhythm no murmurs    Assessment #1 fatigue #2 asthma #3 allergies    Plan #1 blood test ordered fasting #2 refilled albuterol and Singulair continue Breo #3 Xyzal prescription follow-up with allergist as scheduled

## 2024-02-16 ENCOUNTER — LAB ENCOUNTER (OUTPATIENT)
Dept: LAB | Age: 29
End: 2024-02-16
Attending: FAMILY MEDICINE
Payer: COMMERCIAL

## 2024-02-16 DIAGNOSIS — R53.83 FATIGUE, UNSPECIFIED TYPE: ICD-10-CM

## 2024-02-16 LAB
ALBUMIN SERPL-MCNC: 4.5 G/DL (ref 3.2–4.8)
ALBUMIN/GLOB SERPL: 1.7 {RATIO} (ref 1–2)
ALP LIVER SERPL-CCNC: 48 U/L
ALT SERPL-CCNC: 17 U/L
ANION GAP SERPL CALC-SCNC: 5 MMOL/L (ref 0–18)
AST SERPL-CCNC: 21 U/L (ref ?–34)
BASOPHILS # BLD AUTO: 0.08 X10(3) UL (ref 0–0.2)
BASOPHILS NFR BLD AUTO: 1 %
BILIRUB SERPL-MCNC: 1.4 MG/DL (ref 0.3–1.2)
BUN BLD-MCNC: 18 MG/DL (ref 9–23)
BUN/CREAT SERPL: 24.3 (ref 10–20)
CALCIUM BLD-MCNC: 9.4 MG/DL (ref 8.7–10.4)
CHLORIDE SERPL-SCNC: 106 MMOL/L (ref 98–112)
CO2 SERPL-SCNC: 27 MMOL/L (ref 21–32)
CREAT BLD-MCNC: 0.74 MG/DL
DEPRECATED RDW RBC AUTO: 40.6 FL (ref 35.1–46.3)
EGFRCR SERPLBLD CKD-EPI 2021: 112 ML/MIN/1.73M2 (ref 60–?)
EOSINOPHIL # BLD AUTO: 0.12 X10(3) UL (ref 0–0.7)
EOSINOPHIL NFR BLD AUTO: 1.5 %
ERYTHROCYTE [DISTWIDTH] IN BLOOD BY AUTOMATED COUNT: 13.2 % (ref 11–15)
FASTING STATUS PATIENT QL REPORTED: YES
GLOBULIN PLAS-MCNC: 2.7 G/DL (ref 2.8–4.4)
GLUCOSE BLD-MCNC: 88 MG/DL (ref 70–99)
HCT VFR BLD AUTO: 39.9 %
HGB BLD-MCNC: 12.7 G/DL
IMM GRANULOCYTES # BLD AUTO: 0.03 X10(3) UL (ref 0–1)
IMM GRANULOCYTES NFR BLD: 0.4 %
LYMPHOCYTES # BLD AUTO: 1.83 X10(3) UL (ref 1–4)
LYMPHOCYTES NFR BLD AUTO: 23 %
MCH RBC QN AUTO: 26.8 PG (ref 26–34)
MCHC RBC AUTO-ENTMCNC: 31.8 G/DL (ref 31–37)
MCV RBC AUTO: 84.4 FL
MONOCYTES # BLD AUTO: 0.48 X10(3) UL (ref 0.1–1)
MONOCYTES NFR BLD AUTO: 6 %
NEUTROPHILS # BLD AUTO: 5.42 X10 (3) UL (ref 1.5–7.7)
NEUTROPHILS # BLD AUTO: 5.42 X10(3) UL (ref 1.5–7.7)
NEUTROPHILS NFR BLD AUTO: 68.1 %
OSMOLALITY SERPL CALC.SUM OF ELEC: 287 MOSM/KG (ref 275–295)
PLATELET # BLD AUTO: 329 10(3)UL (ref 150–450)
POTASSIUM SERPL-SCNC: 4.3 MMOL/L (ref 3.5–5.1)
PROT SERPL-MCNC: 7.2 G/DL (ref 5.7–8.2)
RBC # BLD AUTO: 4.73 X10(6)UL
SODIUM SERPL-SCNC: 138 MMOL/L (ref 136–145)
TSI SER-ACNC: 1.34 MIU/ML (ref 0.55–4.78)
VIT D+METAB SERPL-MCNC: 35.7 NG/ML (ref 30–100)
WBC # BLD AUTO: 8 X10(3) UL (ref 4–11)

## 2024-02-16 PROCEDURE — 36415 COLL VENOUS BLD VENIPUNCTURE: CPT | Performed by: FAMILY MEDICINE

## 2024-02-16 PROCEDURE — 85025 COMPLETE CBC W/AUTO DIFF WBC: CPT | Performed by: FAMILY MEDICINE

## 2024-02-16 PROCEDURE — 82306 VITAMIN D 25 HYDROXY: CPT

## 2024-02-16 PROCEDURE — 84443 ASSAY THYROID STIM HORMONE: CPT | Performed by: FAMILY MEDICINE

## 2024-02-16 PROCEDURE — 80053 COMPREHEN METABOLIC PANEL: CPT | Performed by: FAMILY MEDICINE

## 2024-02-17 ENCOUNTER — TELEPHONE (OUTPATIENT)
Dept: FAMILY MEDICINE CLINIC | Facility: CLINIC | Age: 29
End: 2024-02-17

## 2024-02-17 NOTE — TELEPHONE ENCOUNTER
Patient returning call.   Test results and provider's recommendations given.   Per patient, [employer] message with Central Scheduling number sent.

## 2024-02-17 NOTE — TELEPHONE ENCOUNTER
----- Message from Louis Mckeon DO sent at 2/16/2024  3:45 PM CST -----  Slight elevation in bilirubin ultrasound ordered of the liver.  Other labs are normal.

## 2024-02-20 ENCOUNTER — OFFICE VISIT (OUTPATIENT)
Dept: ALLERGY | Facility: CLINIC | Age: 29
End: 2024-02-20

## 2024-02-20 ENCOUNTER — NURSE ONLY (OUTPATIENT)
Dept: ALLERGY | Facility: CLINIC | Age: 29
End: 2024-02-20

## 2024-02-20 VITALS
RESPIRATION RATE: 19 BRPM | SYSTOLIC BLOOD PRESSURE: 120 MMHG | DIASTOLIC BLOOD PRESSURE: 67 MMHG | HEART RATE: 82 BPM | OXYGEN SATURATION: 100 %

## 2024-02-20 DIAGNOSIS — J30.89 SEASONAL AND PERENNIAL ALLERGIC RHINOCONJUNCTIVITIS: Primary | ICD-10-CM

## 2024-02-20 DIAGNOSIS — Z23 NEED FOR COVID-19 VACCINE: ICD-10-CM

## 2024-02-20 DIAGNOSIS — Z23 FLU VACCINE NEED: ICD-10-CM

## 2024-02-20 DIAGNOSIS — J30.2 SEASONAL AND PERENNIAL ALLERGIC RHINOCONJUNCTIVITIS: Primary | ICD-10-CM

## 2024-02-20 DIAGNOSIS — K20.0 EOSINOPHILIC ESOPHAGITIS: Primary | ICD-10-CM

## 2024-02-20 DIAGNOSIS — K20.0 EOSINOPHILIC ESOPHAGITIS: ICD-10-CM

## 2024-02-20 DIAGNOSIS — H10.10 SEASONAL AND PERENNIAL ALLERGIC RHINOCONJUNCTIVITIS: Primary | ICD-10-CM

## 2024-02-20 PROCEDURE — 95004 PERQ TESTS W/ALRGNC XTRCS: CPT | Performed by: ALLERGY & IMMUNOLOGY

## 2024-02-20 PROCEDURE — 99214 OFFICE O/P EST MOD 30 MIN: CPT | Performed by: ALLERGY & IMMUNOLOGY

## 2024-02-20 PROCEDURE — 95024 IQ TESTS W/ALLERGENIC XTRCS: CPT | Performed by: ALLERGY & IMMUNOLOGY

## 2024-02-20 RX ORDER — MOMETASONE FUROATE 200 UG/1
2 AEROSOL RESPIRATORY (INHALATION) 2 TIMES DAILY
Qty: 3 EACH | Refills: 0 | Status: SHIPPED | OUTPATIENT
Start: 2024-02-20

## 2024-02-20 NOTE — PROGRESS NOTES
Krupa Stock is a 28 year old female.    HPI:   No chief complaint on file.    Patient is a 28-year-old female who presents for follow-up.  Patient last seen by me in April 2021    Patient with history of allergic rhinitis with prior skin testing being positive to dust mites and cats  Patient also with a history of eosinophilic esophagitis.  Last seen by GI in January 2024    Patient with prior dilation in February 2021  GI notes previous trials of swallowed Flovent in the past    Prior EGD with biopsy and dilation  in 2021 showed 62 eos  in the distal esophagus and 10 eosinophils in the proximal esophagus    Medication list include Breo 200, proton pump inhibitor of albuterol, Singulair    COVID-vaccine x 4 doses last in 2022  Flu vaccine up-to-date from November 2023    Today patient reports    Ar:  Active or persistent symptoms: rnnelly   Active meds: xzyal   pets :  1 dog       Eoe:   On ppi  bid   Per pt GI did not offer to Dupixant thru GI , advised to see allergy   Yet to try elimination  diet   Tried swallowed ICS   Ed or food impaction in  interim   Current dysphagia : intermittent   Current gerd symptoms: yes   Last egd was  a few years ago, to have repeat in May 2024      Today patient reports  Dx with asthma in interim   Had pertussis and rsv in interim   Breo alb singulair   Denies current symptoms in office   Better with breo   Nonsmoker  Mom tyron               HISTORY:  Past Medical History:   Diagnosis Date    Asthma (HCC)     COVID-19     Depression     Eosinophilic esophagitis     Esophageal reflux     Gastritis       Past Surgical History:   Procedure Laterality Date    CHOLECYSTECTOMY  12/1/15    GASTRO - INTERNAL  02/2021    OTHER      left ankle    REMOVAL GALLBLADDER        Family History   Problem Relation Age of Onset    Diabetes Mother         type 1    Other (Other) Mother     Hypertension Maternal Grandmother     Hypertension Maternal Grandfather       Social History:   Social  History     Socioeconomic History    Marital status: Single   Tobacco Use    Smoking status: Never    Smokeless tobacco: Never    Tobacco comments:     Mom smoked when she was growing up.     Vaping Use    Vaping Use: Never used   Substance and Sexual Activity    Alcohol use: Yes     Comment: occasional    Drug use: No        Medications (Active prior to today's visit):  Current Outpatient Medications   Medication Sig Dispense Refill    fluticasone furoate-vilanterol (BREO ELLIPTA) 200-25 MCG/ACT Inhalation Aerosol Powder, Breath Activated Inhale 1 puff into the lungs daily. 60 each 1    montelukast (SINGULAIR) 10 MG Oral Tab Take 1 tablet (10 mg total) by mouth nightly. 90 tablet 1    levocetirizine 5 MG Oral Tab Take 1 tablet (5 mg total) by mouth every morning. 90 tablet 3    albuterol 108 (90 Base) MCG/ACT Inhalation Aero Soln Inhale 2 puffs into the lungs every 6 (six) hours as needed. 8.5 g 5    collagenase 250 UNIT/GM External Ointment Apply topically daily.      Omeprazole 40 MG Oral Capsule Delayed Release Take 1 capsule (40 mg total) by mouth daily. Take 1 capsule by mouth daily before breakfast. 30 capsule 11    albuterol 108 (90 Base) MCG/ACT Inhalation Aero Soln Inhale 2 puffs into the lungs every 4 (four) hours as needed for Wheezing or Shortness of Breath. 18 g 1    montelukast (SINGULAIR) 10 MG Oral Tab Take 1 tablet (10 mg total) by mouth daily. 90 tablet 3    meclizine 25 MG Oral Tab Take 1 tablet (25 mg total) by mouth 3 (three) times daily as needed. 45 tablet 0    Multiple Vitamins-Minerals (MULTIVITAMIN ADULT OR) Take by mouth.         Allergies:  Allergies   Allergen Reactions    Dust Mite Extract ANAPHYLAXIS    Cephalexin RASH and ITCHING    Penicillins RASH         ROS:   Allergic/Immuno:  See hpi  Cardiovascular:  Negative for irregular heartbeat/palpitations, chest pain, edema  Constitutional:  Negative night sweats,weight loss, irritability and lethargy  ENMT:  Negative for ear drainage,  hearing loss and nasal drainage  Eyes:  Negative for eye discharge and vision loss  Gastrointestinal:  Negative for abdominal pain, diarrhea and vomiting  Integumentary:  Negative for pruritus and rash  Respiratory:  Negative for cough, dyspnea and wheezing    PHYSICAL EXAM:   Constitutional: responsive, no acute distress noted  Head/Face: NC/Atraumatic  Eyes/Vision: conjunctiva and lids are normal extraocular motion is intact   Ears/Audiometry: tympanic membranes are normal bilaterally hearing is grossly intact  Nose/Mouth/Throat: nose and throat are clear mucous membranes are moist   Neck/Thyroid: neck is supple without adenopathy  Lymphatic: no abnormal cervical, supraclavicular or axillary adenopathy is noted  Respiratory: normal to inspection lungs are clear to auscultation bilaterally normal respiratory effort   Cardiovascular: regular rate and rhythm no murmurs, gallups, or rubs  Abdomen: soft non-tender non-distended  Skin/Hair: no unusual rashes present   Extremities: no edema, cyanosis, or clubbing     ASSESSMENT/PLAN:   Assessment   Encounter Diagnoses   Name Primary?    Seasonal and perennial allergic rhinoconjunctivitis Yes    Flu vaccine need     Need for COVID-19 vaccine      Skin testing today to common indoor and outdoor environmental allergies was dm, cat mold     Skin testing today to common food allergens including milk egg wheat soy almond walnut peanut was milk     Positive histamine control      #1 eosinophilic esophagitis  Followed by GI.  Last endoscopy was in 2021.  Biopsy report from none reviewed  Still with current symptoms in spite of proton pump libido twice a day.  Previous course of swallowed budesonide in the past without improvement  Patient to have repeat EGD in May 2024 with GI  See above skin testing to reevaluate for allergic triggers both food and environmental allergies  Continue with proton pump inhibitor twice a day  Trial of swallowed inhaled corticosteroid via HFA 2 puffs  swallowed twice a day.  Do not eat or drink for 30 minutes afterwards  May consider trial of elimination diet starting with milk and wheat for approximately 6 to 8 weeks  If not improving may consider Dupixent if repeat EGD in May 2024 still shows EOE.  Reviewed with patient that GI should also have the ability to prescribe Dupixent as a biologic if needed  Reviewed approval approval process for Dupixent can take a month    #2 asthma  Continue with Breo and Singulair  Albuterol as needed.  Denies persistent asthma symptoms since starting Breo.  Reviewed signs and symptoms of persistent asthma including the rules of 2  Reviewed goals of treatment her asthma symptoms 2 days/week or less albuterol usage 2 days/week or less and prednisone once a year last    #3 allergic rhinitis  Reviewed avoidance measures and potential treatment option immunotherapy  May consider Xyzal, levocetirizine 5 mg once a day as an antihistamine  Flonase 2 sprays per nostril once a day if having prominent nasal congestion or postnasal drip    #4 COVID-vaccine booster recommended reviewed I do not have the booster my office    5.  Flu vaccine up-to-date    #6 recommend pneumonia vaccine with Prevnar 20 given history of asthma               Orders This Visit:  No orders of the defined types were placed in this encounter.      Meds This Visit:  Requested Prescriptions      No prescriptions requested or ordered in this encounter       Imaging & Referrals:  None     2/20/2024  Isaac Gottlieb MD    If medication samples were provided today, they were provided solely for patient education and training related to self administration of these medications.  Teaching, instruction and sample was provided to the patient by myself.  Teaching included  a review of potential adverse side effects as well as potential efficacy.  Patient's questions were answered in regards to medication administration and dosing and potential side effects. Teaching was  provided via the teach back method

## 2024-03-01 ENCOUNTER — HOSPITAL ENCOUNTER (OUTPATIENT)
Dept: ULTRASOUND IMAGING | Age: 29
Discharge: HOME | End: 2024-03-01
Attending: FAMILY MEDICINE
Payer: COMMERCIAL

## 2024-03-01 DIAGNOSIS — R17 ELEVATED BILIRUBIN: ICD-10-CM

## 2024-03-01 PROCEDURE — 76705 ECHO EXAM OF ABDOMEN: CPT | Performed by: FAMILY MEDICINE

## 2024-05-06 RX ORDER — FLUTICASONE FUROATE AND VILANTEROL 200; 25 UG/1; UG/1
1 POWDER RESPIRATORY (INHALATION) DAILY
Qty: 1 EACH | Refills: 3 | Status: SHIPPED | OUTPATIENT
Start: 2024-05-06

## 2024-05-06 NOTE — TELEPHONE ENCOUNTER
Please review. Protocol Failed; No Protocol    Requested Prescriptions   Pending Prescriptions Disp Refills    BREO ELLIPTA 200-25 MCG/ACT Inhalation Aerosol Powder, Breath Activated [Pharmacy Med Name: Breo Ellipta 200-25 Mcg/Act Inh Glax]  0     Sig: INHALE 1 PUFF INTO THE LUNGS DAILY       Asthma & COPD Medication Protocol Failed - 5/3/2024 12:40 PM        Failed - Asthma Action Score greater than or equal to 20        Failed - AAP/ACT given in last 12 months     No data recorded  No data recorded  No data recorded  No data recorded          Passed - Appointment in past 6 or next 3 months      Recent Outpatient Visits              2 months ago Eosinophilic esophagitis    [facility]    Nurse Only    2 months ago Seasonal and perennial allergic rhinoconjunctivitis    [facility] Isaac Gottlieb MD    Office Visit    2 months ago Fatigue, unspecified type    [facility] [place]    Office Visit    3 months ago Eosinophilic esophagitis    [facility] Mari Jones APRN    [facility] Visit    4 months [facility]    [facility]          Future Appointments         Provider Department Appt Notes    Tomorrow Louis Mckeon, DO [facility], [address], [place] I’m having lower back pain for at least a week now and it’s not getting any better.    In 1 week MIKE MAURO [facility] Egd w/mac @[facility]                           Future Appointments         Provider Department Appt Notes    Tomorrow Louis Mckeon,  [facility], [address], [place] I’m having lower back pain for at least a week now and it’s not getting any better.    In 1 week MIKE MAURO  [facility] Egd w/mac @[facility]          Recent Outpatient Visits              2 months ago Eosinophilic esophagitis    [facility]    Nurse Only    2 months ago Seasonal and perennial allergic rhinoconjunctivitis    [facility] Isaac Gottlieb MD    Office Visit    2 months ago Fatigue, unspecified type    [facility] [place] Cespedes, David B,     Office Visit    3 months ago Eosinophilic esophagitis    [facility] Mari Jones APRN    Office Visit    4 months ago Acute cough    [facility] [place] Cespedes, David B,     Office Visit

## 2024-05-07 ENCOUNTER — OFFICE VISIT (OUTPATIENT)
Dept: FAMILY MEDICINE CLINIC | Facility: CLINIC | Age: 29
End: 2024-05-07
Payer: COMMERCIAL

## 2024-05-07 VITALS
BODY MASS INDEX: 44.9 KG/M2 | OXYGEN SATURATION: 99 % | HEART RATE: 76 BPM | HEIGHT: 64 IN | SYSTOLIC BLOOD PRESSURE: 127 MMHG | RESPIRATION RATE: 16 BRPM | DIASTOLIC BLOOD PRESSURE: 85 MMHG | WEIGHT: 263 LBS

## 2024-05-07 DIAGNOSIS — M54.50 ACUTE BILATERAL LOW BACK PAIN WITHOUT SCIATICA: ICD-10-CM

## 2024-05-07 DIAGNOSIS — J45.901 MILD ASTHMA WITH EXACERBATION, UNSPECIFIED WHETHER PERSISTENT (HCC): Primary | ICD-10-CM

## 2024-05-07 DIAGNOSIS — J01.90 ACUTE NON-RECURRENT SINUSITIS, UNSPECIFIED LOCATION: ICD-10-CM

## 2024-05-07 PROCEDURE — 99214 OFFICE O/P EST MOD 30 MIN: CPT | Performed by: FAMILY MEDICINE

## 2024-05-07 RX ORDER — FLUTICASONE FUROATE AND VILANTEROL 200; 25 UG/1; UG/1
1 POWDER RESPIRATORY (INHALATION) DAILY
Qty: 1 EACH | Refills: 3 | Status: SHIPPED | OUTPATIENT
Start: 2024-05-07

## 2024-05-07 RX ORDER — METHYLPREDNISOLONE 4 MG/1
TABLET ORAL
Qty: 1 EACH | Refills: 1 | Status: SHIPPED | OUTPATIENT
Start: 2024-05-07

## 2024-05-07 RX ORDER — DOXYCYCLINE HYCLATE 100 MG
100 TABLET ORAL 2 TIMES DAILY
Qty: 20 TABLET | Refills: 0 | Status: SHIPPED | OUTPATIENT
Start: 2024-05-07

## 2024-05-07 NOTE — PROGRESS NOTES
Blood pressure 127/85, pulse 76, resp. rate 16, height 5' 4\" (1.626 m), weight 263 lb (119.3 kg), last menstrual period 01/21/2024, SpO2 99%, not currently breastfeeding.          Complaining of low back pain and asthma flareup.  Has been using all her asthma medications.  Cough not keeping her awake at night at this time.  Some difficulty breathing but not right now.  Cough began 4 days ago.  There is some yellow phlegm and nasal congestion.  She is not sexually active.    She denies trauma to the back.  She did try a new yoga pose.  No pain down the legs.  Back pain seems to be improving.    Objective lungs clear to auscultation no rales rhonchi or wheezes    Throat clear nonerythematous    Back without spinous process tenderness    Negative ELEANOR testing    Negative FADIR testing    Negative straight leg raise bilaterally    Assessment #1 asthma exacerbation #2 sinusitis #3 low back pain    Plan #1 Medrol Dosepak doxycycline #2 doxycycline #3 Medrol Dosepak    GO TO ER IF SYMPTOMS PERSIST OR WORSEN.       Follow-up if no improvement

## 2024-05-16 ENCOUNTER — HOSPITAL ENCOUNTER (OUTPATIENT)
Facility: HOSPITAL | Age: 29
Setting detail: HOSPITAL OUTPATIENT SURGERY
Discharge: HOME | End: 2024-05-16
Attending: INTERNAL MEDICINE | Admitting: INTERNAL MEDICINE

## 2024-05-16 ENCOUNTER — ANESTHESIA (OUTPATIENT)
Dept: ENDOSCOPY | Facility: HOSPITAL | Age: 29
End: 2024-05-16

## 2024-05-16 ENCOUNTER — ANESTHESIA EVENT (OUTPATIENT)
Dept: ENDOSCOPY | Facility: HOSPITAL | Age: 29
End: 2024-05-16

## 2024-05-16 VITALS
SYSTOLIC BLOOD PRESSURE: 139 MMHG | TEMPERATURE: 98 F | DIASTOLIC BLOOD PRESSURE: 91 MMHG | HEIGHT: 64 IN | RESPIRATION RATE: 13 BRPM | HEART RATE: 67 BPM | WEIGHT: 263 LBS | OXYGEN SATURATION: 99 % | BODY MASS INDEX: 44.9 KG/M2

## 2024-05-16 DIAGNOSIS — K20.0 EOSINOPHILIC ESOPHAGITIS: ICD-10-CM

## 2024-05-16 LAB — B-HCG UR QL: NEGATIVE

## 2024-05-16 PROCEDURE — 0D748ZZ DILATION OF ESOPHAGOGASTRIC JUNCTION, VIA NATURAL OR ARTIFICIAL OPENING ENDOSCOPIC: ICD-10-PCS | Performed by: INTERNAL MEDICINE

## 2024-05-16 PROCEDURE — 0DB38ZX EXCISION OF LOWER ESOPHAGUS, VIA NATURAL OR ARTIFICIAL OPENING ENDOSCOPIC, DIAGNOSTIC: ICD-10-PCS | Performed by: INTERNAL MEDICINE

## 2024-05-16 PROCEDURE — 43239 EGD BIOPSY SINGLE/MULTIPLE: CPT | Performed by: INTERNAL MEDICINE

## 2024-05-16 PROCEDURE — 43249 ESOPH EGD DILATION <30 MM: CPT | Performed by: INTERNAL MEDICINE

## 2024-05-16 RX ORDER — SODIUM CHLORIDE, SODIUM LACTATE, POTASSIUM CHLORIDE, CALCIUM CHLORIDE 600; 310; 30; 20 MG/100ML; MG/100ML; MG/100ML; MG/100ML
INJECTION, SOLUTION INTRAVENOUS CONTINUOUS
Status: DISCONTINUED | OUTPATIENT
Start: 2024-05-16 | End: 2024-05-16

## 2024-05-16 RX ORDER — LIDOCAINE HYDROCHLORIDE 10 MG/ML
INJECTION, SOLUTION EPIDURAL; INFILTRATION; INTRACAUDAL; PERINEURAL AS NEEDED
Status: DISCONTINUED | OUTPATIENT
Start: 2024-05-16 | End: 2024-05-16 | Stop reason: SURG

## 2024-05-16 RX ORDER — NALOXONE HYDROCHLORIDE 0.4 MG/ML
0.08 INJECTION, SOLUTION INTRAMUSCULAR; INTRAVENOUS; SUBCUTANEOUS ONCE AS NEEDED
Status: DISCONTINUED | OUTPATIENT
Start: 2024-05-16 | End: 2024-05-16

## 2024-05-16 RX ADMIN — SODIUM CHLORIDE, SODIUM LACTATE, POTASSIUM CHLORIDE, CALCIUM CHLORIDE: 600; 310; 30; 20 INJECTION, SOLUTION INTRAVENOUS at 13:19:00

## 2024-05-16 RX ADMIN — LIDOCAINE HYDROCHLORIDE 40 MG: 10 INJECTION, SOLUTION EPIDURAL; INFILTRATION; INTRACAUDAL; PERINEURAL at 13:06:00

## 2024-05-16 RX ADMIN — SODIUM CHLORIDE, SODIUM LACTATE, POTASSIUM CHLORIDE, CALCIUM CHLORIDE: 600; 310; 30; 20 INJECTION, SOLUTION INTRAVENOUS at 13:03:00

## 2024-05-16 NOTE — H&P
History & Physical Examination    Patient Name: Krupa Stock  MRN: [id number]  CSN: [id number]  YOB: 1995    Diagnosis:     EoE       Medications Prior to Admission   Medication Sig Dispense Refill Last Dose    Doxycycline Hyclate 100 MG Oral Tab Take 1 tablet (100 mg total) by mouth 2 (two) times daily. 20 tablet 0 5/15/2024 at 1100    fluticasone furoate-vilanterol (BREO ELLIPTA) 200-25 MCG/ACT Inhalation Aerosol Powder, Breath Activated Inhale 1 puff into the lungs daily. 1 each 3 5/16/2024 at 0900    levocetirizine 5 MG Oral Tab Take 1 tablet (5 mg total) by mouth every morning. 90 tablet 3 5/16/2024 at 0900    Omeprazole 40 MG Oral Capsule Delayed Release Take 1 capsule (40 mg total) by mouth daily. Take 1 capsule by mouth daily before breakfast. 30 capsule 11 5/16/2024 at 0900    albuterol 108 (90 Base) MCG/ACT Inhalation Aero Soln Inhale 2 puffs into the lungs every 4 (four) hours as needed for Wheezing or Shortness of Breath. 18 g 1 prn    montelukast (SINGULAIR) 10 MG Oral Tab Take 1 tablet (10 mg total) by mouth daily. 90 tablet 3 5/16/2024 at 0900    meclizine 25 MG Oral Tab Take 1 tablet (25 mg total) by mouth 3 (three) times daily as needed. 45 tablet 0 prn    Multiple Vitamins-Minerals (MULTIVITAMIN ADULT OR) Take by mouth.   5/15/2024 at 0000    methylPREDNISolone (MEDROL) 4 MG Oral Tablet Therapy Pack As directed. 1 each 1     montelukast (SINGULAIR) 10 MG Oral Tab Take 1 tablet (10 mg total) by mouth nightly. 90 tablet 1     albuterol 108 (90 Base) MCG/ACT Inhalation Aero Soln Inhale 2 puffs into the lungs every 6 (six) hours as needed. 8.5 g 5     collagenase 250 UNIT/GM External Ointment Apply topically daily. (Patient not taking: Reported on 2/20/2024)        Current Facility-Administered Medications   Medication Dose Route Frequency    lactated ringers infusion   Intravenous Continuous       Allergies:   Allergies   Allergen Reactions    Dairy Products SHORTNESS OF BREATH     Dust Mite Extract ANAPHYLAXIS    Cat Hair Extract UNKNOWN    Mold UNKNOWN    Cephalexin RASH and ITCHING    Penicillins RASH       Past Medical History:    Asthma (HCC)    COVID-19    Depression    Eosinophilic esophagitis    Esophageal reflux    Gastritis     Past Surgical History:   Procedure Laterality Date    Cholecystectomy  12/1/15    Gastro - internal  02/2021    Other      left ankle    Removal gallbladder       Family History   Problem Relation Age of Onset    Diabetes Mother         type 1    Other (Other) Mother     Hypertension Maternal Grandmother     Hypertension Maternal Grandfather      Social History     Tobacco Use    Smoking status: Never    Smokeless tobacco: Never    Tobacco comments:     Mom smoked when she was growing up.     Substance Use Topics    Alcohol use: Yes     Comment: occasional       SYSTEM Check if Review is Normal Check if Physical Exam is Normal If not normal, please explain:   HEENT [x ] [ x]    NECK & BACK [x ] [x ]    HEART [x ] [ x]    LUNGS [x ] [ x]    ABDOMEN [x ] [x ]    UROGENITAL [ ] [ ]    EXTREMITIES [x ] [x ]    OTHER        [ x ] I have discussed the risks and benefits and alternatives with the patient/family.  They understand and agree to proceed with plan of care.  [ x ] I have reviewed the History and Physical done within the last 30 days.  Any changes noted above.    Mohamud Ureña MD  5/16/2024  12:48 PM

## 2024-05-16 NOTE — DISCHARGE INSTRUCTIONS
Home Care Instructions for Colonoscopy and/or Gastroscopy with Sedation    Diet:  - Resume your regular diet as tolerated unless otherwise instructed.  - Start with light meals to minimize bloating.  - Do not drink alcohol today.    Medication:  - If you have questions about resuming your normal medications, please contact your Primary Care Physician.    Activities:  - Take it easy today. Do not return to work today.  - Do not drive today.  - Do not operate any machinery today (including kitchen equipment).      Gastroscopy:  - You may have a sore throat for 2-3 days following the exam. This is normal. Gargling with warm salt water (1/2 tsp salt to 1 glass warm water) or using throat lozenges will help.  - If you experience any sharp pain in your neck, abdomen or chest, vomiting of blood, oral temperature over 100 degrees Fahrenheit, light-headedness or dizziness, or any other problems, contact your doctor.    CLEAR LIQUIDS TODAY (5/16) then SOFT DIET (5/17 and 5/18)    **If unable to reach your doctor, please go to the [facility] Emergency Room**    - Your referring physician will receive a full report of your examination.  - If you do not hear from your doctor's office within two weeks of your biopsy, please call them for your results.    You may be able to see your laboratory results in [employer] between 4 and 7 business days.  In some cases, your physician may not have viewed the results before they are released to [employer].  If you have questions regarding your results contact the physician who ordered the test/exam by phone or via [employer] by choosing \"Ask a Medical Question.\"

## 2024-05-16 NOTE — OPERATIVE REPORT
[facility] Endoscopy Report  Date of procedure-May 16, 2024    Preoperative Diagnosis:  -History of eosinophilic esophagitis  -Dysphagia      Postoperative Diagnosis:  -Esophageal stricture at the GE junction/Schatzki's ring status post dilation with TTS balloon  -Hiatal hernia      Procedure:    Esophagogastroduodenoscopy plus TTS balloon dilation to 16.5 mm esophageal stricture      Surgeon:  Mohamud Ureña M.D.    Anesthesia:  MAC sedation-please see anesthesia records    Technique:  After informed consent, the patient was placed in the left lateral recumbent position.   An Olympus adult HD gastroscope was inserted into the hypopharynx and advanced under direct vision into the esophagus, stomach and duodenum.  The endoscope was withdrawn to the stomach where retroflexion of the annulus, body, cardia and fundus was performed.  The upper endoscope was then positioned in the distal esophagus.  A TTS balloon dilation catheter was advanced to the scope and centered over the distal esophageal stricture/narrowing at the GE junction, this appeared to be Schatzki's ring like.  This was dilated to 15 mm and then deflated.  No mucosal effect noted.  This was dilated to 16.5 mm then deflated after holding it for 30 seconds.  No perforation or bleeding were noted.  Biopsies were taken from the distal and midesophagus and labeled in separate containers.  The procedures were well tolerated without immediate complication.    Findings:  The esophagus showed esophageal stricture at the GE junction at 38 cm, Schatzki's ring in appearance.  Balloon dilated and biopsy disrupted.  The mid and more proximal esophagus furrowing was noted but no narrowing of the luminal diameter and no further strictures or rings were noted.  The GE junction and diaphragmatic impression were at 38 cm and 40 cm for 2 cm hiatal hernia.  The stomach distended appropriately with insufflated air.  The mucosa of the stomach including cardia,  fundus, gastric body and antrum were normal.  The duodenal bulb and post bulbar regions were normal.    Estimated blood loss-insignificant  Specimens-see above    Impression:  -Esophageal stricture at the GE junction/Schatzki's ring status post dilation with TTS balloon  -Hiatal hernia    Recommendations:  - Post procedure/esophageal dilation instructions given  - Follow up on biopsies - discussed continue on PPI and possibly adding topical budesonide or Dupixent.           Mohamud Ureña MD  5/16/2024  1:29 PM

## 2024-05-16 NOTE — ANESTHESIA POSTPROCEDURE EVALUATION
Patient: Krupa Stock    Procedure Summary       Date: 05/16/24 Room / Location: [facility] ENDOSCOPY 04 / [facility] ENDOSCOPY    Anesthesia Start: 1303 Anesthesia Stop: 1320    Procedure: ESOPHAGOGASTRODUODENOSCOPY with Dilatation Diagnosis:       Eosinophilic esophagitis      (Schatzki ring, hiatal hernia)    Surgeons: Mohamud Ureña MD Anesthesiologist: Haydee Sainz CRNA    Anesthesia Type: general ASA Status: 2            Anesthesia Type: general    Vitals Value Taken Time   /84 05/16/24 1319   Temp 98 °F (36.7 °C) 05/16/24 1319   Pulse 87 05/16/24 1319   Resp 12 05/16/24 1319   SpO2 100 % 05/16/24 1319       [facility] AN Post Evaluation:   Patient Evaluated in PACU  Patient Participation: complete - patient participated  Level of Consciousness: sleepy but conscious  Pain Score: 0  Pain Management: adequate  Airway Patency:patent  Dental exam unchanged from preop  Yes    Cardiovascular Status: acceptable  Respiratory Status: acceptable  Postoperative Hydration acceptable    Haydee Sainz CRNA  5/16/2024 1:20 PM
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
## 2024-05-17 ENCOUNTER — MED REC SCAN ONLY (OUTPATIENT)
Facility: CLINIC | Age: 29
End: 2024-05-17

## 2024-05-21 ENCOUNTER — TELEPHONE (OUTPATIENT)
Dept: GASTROENTEROLOGY | Facility: CLINIC | Age: 29
End: 2024-05-21

## 2024-05-21 NOTE — TELEPHONE ENCOUNTER
Recall EGD in 11/2024 with Dr. Ureña per APN. EGD done on 5/16/24.    Message sent to pt outreach to repeat EGD in 11/2024.

## 2024-05-30 NOTE — ED INITIAL ASSESSMENT (HPI)
Problem: Discharge Planning  Goal: Discharge to home or other facility with appropriate resources  Outcome: Progressing     Problem: Chronic Conditions and Co-morbidities  Goal: Patient's chronic conditions and co-morbidity symptoms are monitored and maintained or improved  Outcome: Progressing     Problem: Safety - Adult  Goal: Free from fall injury  Outcome: Progressing  Flowsheets (Taken 5/29/2024 2000)  Free From Fall Injury: Instruct family/caregiver on patient safety     Problem: Skin/Tissue Integrity  Goal: Absence of new skin breakdown  Description: 1.  Monitor for areas of redness and/or skin breakdown  2.  Assess vascular access sites hourly  3.  Every 4-6 hours minimum:  Change oxygen saturation probe site  4.  Every 4-6 hours:  If on nasal continuous positive airway pressure, respiratory therapy assess nares and determine need for appliance change or resting period.  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

## 2024-08-08 ENCOUNTER — NURSE TRIAGE (OUTPATIENT)
Dept: FAMILY MEDICINE CLINIC | Facility: CLINIC | Age: 29
End: 2024-08-08

## 2024-08-08 PROCEDURE — 99284 EMERGENCY DEPT VISIT MOD MDM: CPT

## 2024-08-08 NOTE — TELEPHONE ENCOUNTER
Action Requested: Summary for Provider     []  Critical Lab, Recommendations Needed  [] Need Additional Advice  []   FYI    []   Need Orders  [] Need Medications Sent to Pharmacy  [x]  Other     SUMMARY:  Patient symptoms  start a few weeks ago but it started to get worse on Monday. Patient stated that she has asthma. Patient feels chest tightness and feels its harder to breath and having more coughing fits. When patient uses her inhaler it does help with the coughing fits. Patient is not sure if she has chest wheezing. Per patient she can not hear the wheeze it just feels tight.  Patient has tested for covid this morning and it was negative. Patient stated that she has been using her albuterol inhaler more about 3-4 times a day. . Patient was inform she will need to be seen today for a evaluation. Patient stated that she is on her way to work and she can not call off. Patient will like to know if Dr. Mckeon can see her tomorrow in the morning. Dr. Mckeon can you see patient in the morning?    Patient stated that if her symptoms get worse she will go to the ER.  RN ok to sent patient a [employer] message or you can leave her a message.    Reason for call: Cough      Reason for Disposition   SEVERE coughing spells (e.g., whooping sound after coughing, vomiting after coughing)    Protocols used: Cough-A-OH

## 2024-08-09 ENCOUNTER — HOSPITAL ENCOUNTER (EMERGENCY)
Facility: HOSPITAL | Age: 29
Discharge: HOME | End: 2024-08-09
Attending: STUDENT IN AN ORGANIZED HEALTH CARE EDUCATION/TRAINING PROGRAM
Payer: COMMERCIAL

## 2024-08-09 VITALS
RESPIRATION RATE: 22 BRPM | TEMPERATURE: 98 F | DIASTOLIC BLOOD PRESSURE: 80 MMHG | HEART RATE: 69 BPM | HEIGHT: 64 IN | WEIGHT: 242 LBS | SYSTOLIC BLOOD PRESSURE: 131 MMHG | OXYGEN SATURATION: 96 % | BODY MASS INDEX: 41.32 KG/M2

## 2024-08-09 DIAGNOSIS — J98.01 ACUTE BRONCHOSPASM: Primary | ICD-10-CM

## 2024-08-09 PROCEDURE — 94640 AIRWAY INHALATION TREATMENT: CPT

## 2024-08-09 RX ORDER — PREDNISONE 20 MG/1
40 TABLET ORAL ONCE
Status: COMPLETED | OUTPATIENT
Start: 2024-08-09 | End: 2024-08-09

## 2024-08-09 RX ORDER — IPRATROPIUM BROMIDE AND ALBUTEROL SULFATE 2.5; .5 MG/3ML; MG/3ML
SOLUTION RESPIRATORY (INHALATION)
Status: DISCONTINUED
Start: 2024-08-09 | End: 2024-08-09

## 2024-08-09 RX ORDER — PREDNISONE 20 MG/1
40 TABLET ORAL DAILY
Qty: 8 TABLET | Refills: 0 | Status: SHIPPED | OUTPATIENT
Start: 2024-08-10 | End: 2024-08-14

## 2024-08-09 RX ORDER — IPRATROPIUM BROMIDE AND ALBUTEROL SULFATE 2.5; .5 MG/3ML; MG/3ML
3 SOLUTION RESPIRATORY (INHALATION) ONCE
Status: COMPLETED | OUTPATIENT
Start: 2024-08-09 | End: 2024-08-09

## 2024-08-09 NOTE — ED INITIAL ASSESSMENT (HPI)
Pt presents stating that she thinks she is having an asthma flare-up.  She reports feeling short of breath since this morning, she went to work, maxed out her inhaler use and then started coughing after work.

## 2024-08-09 NOTE — ED PROVIDER NOTES
Patient Seen in: [facility] Emergency Department      History     Chief Complaint   Patient presents with    Cough/URI     Stated Complaint: Asthma,cough    Subjective:   HPI    29-year-old female history of asthma, depression, GERD, presenting for evaluation of concerns for asthma attack and cough.  States has been having cough and shortness of breath for a while, felt more short of breath this evening while at work using her inhaler and then had a significant coughing fit.  Denies fevers, no recent antibiotics or steroids.    Objective:   Past Medical History:    Asthma (HCC)    COVID-19    Depression    Eosinophilic esophagitis    Esophageal reflux    Gastritis              Past Surgical History:   Procedure Laterality Date    Cholecystectomy  12/01/2015    Gastro - internal  02/2021    Other      left ankle    Removal gallbladder      [place] teeth removed                  Social History     Socioeconomic History    Marital status: Single   Tobacco Use    Smoking status: Never    Smokeless tobacco: Never    Tobacco comments:     Mom smoked when she was growing up.     Vaping Use    Vaping status: Never Used    Passive vaping exposure: Yes   Substance and Sexual Activity    Alcohol use: Yes     Comment: occasional    Drug use: No              Review of Systems    Positive for stated Chief Complaint: Cough/URI    Other systems are as noted in HPI.  Constitutional and vital signs reviewed.      All other systems reviewed and negative except as noted above.    Physical Exam     ED Triage Vitals   BP 08/08/24 2355 157/89   Pulse 08/08/24 2355 78   Resp 08/08/24 2355 22   Temp 08/08/24 2355 98.3 °F (36.8 °C)   Temp src 08/08/24 2355 Oral   SpO2 08/08/24 2355 100 %   O2 Device 08/09/24 0125 None (Room air)       Current Vitals:   Vital Signs  BP: 131/80  Pulse: 69  Resp: 22  Temp: 98.3 °F (36.8 °C)  Temp src: Oral  MAP (mmHg): 95    Oxygen Therapy  SpO2: 96 %  O2 Device: None (Room air)            Physical  Exam    Constitutional: awake, alert, no sig distress  HENT: mmm, no lesions,  Neck: normal range of motion, no tenderness, supple.  Eyes: PERRL, EOMI, conjunctiva normal, no discharge. Sclera anicteric.  Cardiovascular: rr no murmur  Respiratory: Bronchospastic cough, faint end expiratory wheeze  GI: Bowel sounds normal, Soft, no tenderness, no masses, no pulsatile masses.  : No CVA tenderness.  Skin: Warm, dry, no erythema, no rash.  Musculoskeletal: Intact distal pulses, no edema, no tenderness, no cyanosis, no clubbing. Good range of motion in all major joints. No tenderness to palpation or major deformities noted. Back- No tenderness.  Neurologic: Alert & oriented x 3, normal motor function, normal sensory function, no focal deficits noted.  Psych: Calm, cooperative, nl affect    ED Course   Labs Reviewed - No data to display                   MDM      29-year-old female with history as documented above presenting with cough, wheeze.  On arrival vitals are stable and reassuring.  She is saturating well on room air with normal work of breathing.  -Patient has good air entry bilaterally and only faint end expiratory wheeze  Presentation compatible with asthma exacerbation, given neb and steroid.  Improved after neb.  No focal lung sounds suggest bronchopneumonia.  I discussed with patient that I have a low clinical concern for pneumonia and patient has had frequent chest x-rays, when I reviewed patient's medical record there are 9 x-rays f of the chest from 2023 alone.  She is comfortable deferring chest imaging at this time.      Return precautions and follow-up instructions were discussed with patient who voiced understanding and agreement the plan.  All questions were answered to patient satisfaction.                             Medical Decision Making      Disposition and Plan     Clinical Impression:  1. Acute bronchospasm         Disposition:  Discharge  8/9/2024  1:08 am    Follow-up:  Louis Mckeon,  DO  [address] [facility]  [address]  [place] [address]  [phone]    Follow up in 2 day(s)      [facility] Emergency Department  [address]  [facility] [address]  [phone]  Follow up  As needed, If symptoms worsen          Medications Prescribed:  Discharge Medication List as of 8/9/2024  1:29 AM        START taking these medications    Details   predniSONE 20 MG Oral Tab Take 2 tablets (40 mg total) by mouth daily for 4 days., Normal, Disp-8 tablet, R-0

## 2024-08-09 NOTE — ED QUICK NOTES
Patient safe to be discharged home per provider. Discharge education given via printed AVS. Reviewed: follow up care, scheduled medications and when to seek emergency care. Patient verbalizes understanding and is able to teach back. Patient ambulated to exit.

## 2024-08-09 NOTE — DISCHARGE INSTRUCTIONS
Return to the emergency department if you develop significant respiratory distress or increased  work of breathing. If you are having moderate difficulty breathing, please use your albuterol  inhaler 4 puffs every 4 hrs as needed for symptoms. If you are having severe difficulty  breathing, you can use your albuterol inhaler 4 puffs every fifteen minutes as needed but you  must return to the emergency department for further treatment if you are requiring this much  medication.

## 2024-08-15 ENCOUNTER — TELEPHONE (OUTPATIENT)
Facility: CLINIC | Age: 29
End: 2024-08-15

## 2024-08-15 NOTE — TELEPHONE ENCOUNTER
Patient outreach message received:    Recall EGD in 11/2024 with Dr. Ureña per APN. EGD done on 5/16/24.  Message sent to pt outreach to repeat EGD in 11/2024.    Recall reminder letter mailed out to patient.

## 2024-08-26 RX ORDER — FLUTICASONE FUROATE AND VILANTEROL 200; 25 UG/1; UG/1
1 POWDER RESPIRATORY (INHALATION) DAILY
Qty: 3 EACH | Refills: 3 | Status: SHIPPED | OUTPATIENT
Start: 2024-08-26

## 2024-08-26 NOTE — TELEPHONE ENCOUNTER
Please review; protocol failed/ has no protocol    Requested Prescriptions   Pending Prescriptions Disp Refills    BREO ELLIPTA 200-25 MCG/ACT Inhalation Aerosol Powder, Breath Activated [Pharmacy Med Name: Breo Ellipta 200-25 Mcg/Act Inh Glax]  0     Sig: INHALE 1 PUFF INTO THE LUNGS DAILY.       Asthma & COPD Medication Protocol Failed - 8/24/2024  3:00 AM        Failed - Asthma Action Score greater than or equal to 20        Failed - AAP/ACT given in last 12 months     No data recorded  No data recorded  No data recorded  No data recorded          Passed - Appointment in past 6 or next 3 months      Recent Outpatient Visits              3 months ago Mild asthma with exacerbation, unspecified whether persistent ([facility])    [facility], [address], [place] Louis Mckeon, DO    Office Visit    6 months ago Eosinophilic esophagitis    [facility]    Nurse Only    6 months ago Seasonal and perennial [facility]    [facility] Visit    6 months ago Fatigue, unspecified type    [facility], [address], [place] Louis Mckeon, DO    Office Visit    6 months ago Eosinophilic esophagitis    [facility] Mari Jones APRN    Office Visit                         Recent Outpatient Visits              3 months ago Mild asthma with exacerbation, unspecified whether persistent ([facility])    [facility], [address], [place] Louis Mckeon, DO    Office Visit    6 months ago Eosinophilic esophagitis    [facility]    Nurse Only    6 months ago Seasonal and perennial allergic rhinoconjunctivitis    [facility]    Office Visit    6 months ago Fatigue, unspecified type    [place]  [facility], [facility], [place] Cespedes, David B,     Office Visit    6 months ago Eosinophilic esophagitis    [facility], Mari Mariscal, APRN    Office Visit

## 2024-08-30 ENCOUNTER — PATIENT MESSAGE (OUTPATIENT)
Dept: ALLERGY | Facility: CLINIC | Age: 29
End: 2024-08-30

## 2024-08-30 RX ORDER — EPINEPHRINE 0.3 MG/.3ML
INJECTION SUBCUTANEOUS
Qty: 1 EACH | Refills: 0 | Status: SHIPPED | OUTPATIENT
Start: 2024-08-30

## 2024-08-30 NOTE — TELEPHONE ENCOUNTER
RN called pt and left message to notify her that Dr. Gottlieb sent Epipen and when to use it.  Also sent detailed [employer] message as well listed below:    Lee Gentile,    Before logging out for the weekend, I saw that Dr. Gottlieb had seen the [employer] message you had sent and he sent a prescription for the Epipen to your pharmacy.    Please only administer your Epipen in the OUTER thigh muscle for any anaphylactic reaction such as difficulties breathing, talking or swallowing, worsening hives, recurrent vomiting or diarrhea.    ANYTIME you administer the Epipen please follow up in the nearest ER or call 911 immediately for additional monitoring of symptoms and treatment as you will need additional assessment and care.    I know you also have a history of eosinophilic esophagitis--if you ever have any issues with swallowing where you feel like you are choking please follow up in the ER or call 911 immediately as well.    I will be leaving at 12:15 this afternoon and the office will be closed for the rest of the weekend. We will re-open on Tuesday morning at 9am. Please contact our office at [phone] so we can further triage your symptoms.     Thank you,    Susan ZARATE  [facility]  [phone]

## 2024-08-30 NOTE — TELEPHONE ENCOUNTER
EpiPen prescription sent.  Once patient is contacted please inform patient proper indications to use an EpiPen including more severe symptoms with problems breathing swallowing or talking generalized or worsening hives recurrent GI symptoms including throwing up or diarrhea

## 2024-08-30 NOTE — TELEPHONE ENCOUNTER
RN called and left message to triage pt's allergic reaction.  [employer] message sent to Dr. Gottlieb,    In voicemail, RN advised that Dr. Gottlieb is out of the office on Fridays and will return on Tuesday 9/3 due to the holiday.  Advised that RN will be leaving shortly for the weekend.  Requested pt please contact our office to triage symptoms.  Advised that if pt were to ever have allergic reaction again to follow up in ER or call 911.    [employer] message with this same information also sent to pt.    Routed to Dr Gottlieb for review.  Pt last seen on 2/20/2024 for history of eosinophilic esophagitis and asthma.        From: Krupa Stock  To: Isaac Gottlieb  Sent: 8/30/2024 10:49 AM CDT  Subject: Just a quick question…    I was wondering if I could have a prescription for a eppy pin. Sorry if I’m spelling this wrong. But I actually had an allergic reaction to seasoning to a roast from [employer] and it was pretty bad and wish I had this. It made me itch like crazy and cause a rash on my face. It was awlful and it lasted days. I also found out from my last scope that I was having an allergic reaction to something which is probably dairy. I’ve been not having any dairy since my last visit.

## 2024-09-05 ENCOUNTER — HOSPITAL ENCOUNTER (EMERGENCY)
Facility: HOSPITAL | Age: 29
Discharge: HOME | End: 2024-09-05
Attending: EMERGENCY MEDICINE
Payer: COMMERCIAL

## 2024-09-05 ENCOUNTER — APPOINTMENT (OUTPATIENT)
Dept: GENERAL RADIOLOGY | Facility: HOSPITAL | Age: 29
End: 2024-09-05
Attending: EMERGENCY MEDICINE
Payer: COMMERCIAL

## 2024-09-05 VITALS
TEMPERATURE: 99 F | RESPIRATION RATE: 18 BRPM | SYSTOLIC BLOOD PRESSURE: 129 MMHG | BODY MASS INDEX: 40.29 KG/M2 | HEIGHT: 64 IN | DIASTOLIC BLOOD PRESSURE: 88 MMHG | WEIGHT: 236 LBS | HEART RATE: 72 BPM | OXYGEN SATURATION: 99 %
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
## 2024-09-05 DIAGNOSIS — S46.912A STRAIN OF LEFT SHOULDER, INITIAL ENCOUNTER: ICD-10-CM

## 2024-09-05 PROCEDURE — 99284 EMERGENCY DEPT VISIT MOD MDM: CPT

## 2024-09-05 PROCEDURE — 73030 X-RAY EXAM OF SHOULDER: CPT | Performed by: EMERGENCY MEDICINE

## 2024-09-05 PROCEDURE — 99283 EMERGENCY DEPT VISIT LOW MDM: CPT

## 2024-09-05 RX ORDER — IBUPROFEN 600 MG/1
600 TABLET, FILM COATED ORAL ONCE
Status: COMPLETED | OUTPATIENT
Start: 2024-09-05 | End: 2024-09-05
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
## 2024-09-05 NOTE — TELEPHONE ENCOUNTER
RN left voicemail for patient with information listed below   Advised in voicemail  Left call back number with office hours if patient had any additional questions/concerns     Please only administer your Epipen in the OUTER thigh muscle for any anaphylactic reaction such as difficulties breathing, talking or swallowing, worsening hives, recurrent vomiting or diarrhea.     ANYTIME you administer the Epipen please follow up in the nearest ER or call 911 immediately for additional monitoring of symptoms and treatment as you will need additional assessment and care.     I know you also have a history of eosinophilic esophagitis--if you ever have any issues with swallowing where you feel like you are choking please follow up in the ER or call 911 immediately as well.

## 2024-10-02 NOTE — TELEPHONE ENCOUNTER
Received a refill request for EpiPen 0.3 mg/0.3 ml.    Last office visit was 24 for food allergies.    Left a message for patient to please contact our office to discuss refill request- will need to know if patient used Epi Pen or has it .

## 2024-10-05 RX ORDER — EPINEPHRINE 0.3 MG/.3ML
INJECTION SUBCUTANEOUS
Qty: 2 EACH | Refills: 0 | Status: SHIPPED | OUTPATIENT
Start: 2024-10-05

## 2024-10-05 NOTE — TELEPHONE ENCOUNTER
Second message left for patient to return call so we can triage epinephrine usage. Will await call back.

## 2024-10-05 NOTE — TELEPHONE ENCOUNTER
Pt reports she had allergic reaction to pot roast seasoning this past week.   She did not have an epipen at home.   Pt would like a refill for epipen.   Dairy causes flares. She has completely cut out.     Face flushed. Blotches. Itching head to toe.   Rash present on lower body.   Breathing affected. Tightness in chest.   Could not stop itching  Xyzal and benadryl.     Currently the itching has gone away.   RN discussed with patient if she needs to use the Epipen, she needs to dial 911 and go to ER immediately.   Patient to keep a diary of foods that trigger unwanted symptoms so we can attempt to narrow down triggers for testing.   Patient scheduled for a follow up in November with DR. Gottlieb with possible testing.   Pt advised she can continue her asthma inhalers, and montelukast. She would need to discontinue Levocetirizine and benadryl.     Patient to schedule a follow up appointment with Dr. Mckeon for her asthma since he currently manages it.   Patient agreeable to plan of care. She denies further questions.

## 2024-10-05 NOTE — TELEPHONE ENCOUNTER
EpiPen prescription approved.  Agree with triage advice provided.  Continue to avoid suspected foods in the interim until patient can be seen for follow-up.

## 2024-11-27 NOTE — TELEPHONE ENCOUNTER
Please review; protocol failed/ has no protocol    Requested Prescriptions   Pending Prescriptions Disp Refills    MONTELUKAST 10 MG Oral Tab [Pharmacy Med Name: Montelukast Sodium 10 Mg Tab Auro] 90 tablet 0     Sig: TAKE ONE TABLET BY MOUTH IN THE EVENING       Asthma & COPD Medication Protocol Failed - 11/27/2024 12:39 PM        Failed - ACT Score greater than or equal to 20                Failed - Appointment in past 6 or next 3 months      Recent Outpatient Visits              6 months ago Mild asthma with exacerbation, unspecified whether persistent ([facility]    Office Visit    9 months ago Eosinophilic esophagitis    [facility]    Nurse Only    9 months ago Seasonal and perennial allergic rh[facility]oconjunctivitis    [facility]    Office Visit    9 months ago Fatigue, unspecified type    [facility]    Office Visit    10 months ago Eosinophilic esophagitis    [facility] Mari Jones APRN    Office Visit          Future Appointments         Provider Department Appt Notes    In 3 months Isaac Gottlieb MD [facility] allergic reactions to foods.                    Failed - ACT recorded in the last 12 months                   Recent Outpatient Visits              6 months ago Mild asthma with exacerbation, unspecified whether persistent ([facility])    [facility]    Office Visit    9 months ago Eosinophilic esophagitis    [facility]    Nurse Only    9 months ago Seasonal and perennial allergic rhinoconjunctivitis    [facility]  Isaac AKBAR [facility]    Office Visit    9 months ago Fatigue, unspecified type    [facility], [place] Cespedes, David B, DO    Office Visit    10 months ago [facility]osinophilic esophagitis    [facility]    [facility]          Future Appointments         Provider Department Appt Notes    In 3 months Isaac Gottlieb MD [facility] allergic reactions to foods.

## 2024-11-29 RX ORDER — MONTELUKAST SODIUM 10 MG/1
10 TABLET ORAL EVERY EVENING
Qty: 90 TABLET | Refills: 3 | Status: SHIPPED | OUTPATIENT
Start: 2024-11-29

## 2024-12-18 RX ORDER — OMEPRAZOLE 40 MG/1
40 CAPSULE, DELAYED RELEASE ORAL DAILY
Qty: 90 CAPSULE | Refills: 3 | Status: SHIPPED | OUTPATIENT
Start: 2024-12-18

## 2024-12-18 NOTE — TELEPHONE ENCOUNTER
Refill passed per [facility] protocol.    Requested Prescriptions   Pending Prescriptions Disp Refills    OMEPRAZOLE 40 MG Oral Capsule Delayed Release [Pharmacy Med Name: Omeprazole Dr 40 Mg Cap Nort] 30 capsule 0     Sig: Take 1 capsule (40 mg total) by mouth daily. Take 1 capsule by mouth daily before breakfast.       Gastrointestional Medication Protocol Passed - 12/18/2024  5:38 PM        Passed - In person appointment or virtual visit in the past 12 mos or appointment in next 3 mos     Recent Outpatient Visits              7 months ago Mild asthma with exacerbation, unspecified whether persistent ([facility])    [facility], [address], [place] Louis Mckeon DO    Office Visit    10 months ago Eosinophilic esophagitis    [facility]    Nurse Only    10 months ago Seasonal and perennial allergic rhinoconjunctivitis    [facility] Isaac Gottlieb MD    Office Visit    10 months ago Fatigue, unspecified type    [facility], [address], [place] Louis Mckeon DO    Office Visit    10 months ago Eosinophilic esophagitis    [facility] Mari Jones APRN    Office Visit          Future Appointments         Provider Department Appt Notes    In 2 days Louis Mckeon DO [facility], [address], [place] breathing issues    In 2 months Isaac Gottlieb MD [facility] allergic reactions to foods.                       Future Appointments         Provider Department Appt Notes    In 2 days Louis Mckeon DO [facility], [address], [place] breathing issues    In 2 months Isaac Gottlieb MD [facility] allergic reactions to foods.          Recent Outpatient Visits              7 months ago  Mild asthma with exacerbation, unspecified whether persistent (HCC)    [facility] [place],     Office Visit    10 months ago Eosinophilic esophagitis    [facility]    Nurse Only    10 months ago Seasonal and perennial allergic rhinoconjunctivitis    [facility] Isaac Gottlieb MD    Office Visit    10 months ago Fatigue, unspecified type    [facility] [place],     Office Visit    10 months ago Eosinophilic esophagitis    [facility] Mari Jones APRN    Office Visit

## 2024-12-20 ENCOUNTER — OFFICE VISIT (OUTPATIENT)
Dept: FAMILY MEDICINE CLINIC | Facility: CLINIC | Age: 29
End: 2024-12-20

## 2024-12-20 ENCOUNTER — NURSE TRIAGE (OUTPATIENT)
Dept: FAMILY MEDICINE CLINIC | Facility: CLINIC | Age: 29
End: 2024-12-20

## 2024-12-20 VITALS
RESPIRATION RATE: 16 BRPM | OXYGEN SATURATION: 100 % | WEIGHT: 228 LBS | HEART RATE: 74 BPM | DIASTOLIC BLOOD PRESSURE: 82 MMHG | BODY MASS INDEX: 38.93 KG/M2 | HEIGHT: 64 IN | TEMPERATURE: 97 F | SYSTOLIC BLOOD PRESSURE: 134 MMHG

## 2024-12-20 DIAGNOSIS — J45.901 EXACERBATION OF ASTHMA, UNSPECIFIED ASTHMA SEVERITY, UNSPECIFIED WHETHER PERSISTENT (HCC): ICD-10-CM

## 2024-12-20 DIAGNOSIS — J45.40 MODERATE PERSISTENT ASTHMA, UNSPECIFIED WHETHER COMPLICATED (HCC): ICD-10-CM

## 2024-12-20 DIAGNOSIS — R05.1 ACUTE COUGH: Primary | ICD-10-CM

## 2024-12-20 DIAGNOSIS — R63.0 POOR APPETITE: ICD-10-CM

## 2024-12-20 PROCEDURE — 99213 OFFICE O/P EST LOW 20 MIN: CPT

## 2024-12-20 RX ORDER — ALBUTEROL SULFATE 0.83 MG/ML
2.5 SOLUTION RESPIRATORY (INHALATION) EVERY 6 HOURS PRN
Qty: 84 ML | Refills: 0 | Status: SHIPPED | OUTPATIENT
Start: 2024-12-20

## 2024-12-20 RX ORDER — PREDNISONE 20 MG/1
40 TABLET ORAL DAILY
Qty: 10 TABLET | Refills: 0 | Status: SHIPPED | OUTPATIENT
Start: 2024-12-20 | End: 2024-12-25

## 2024-12-20 RX ORDER — NEBULIZER ACCESSORIES
KIT MISCELLANEOUS
Qty: 1 EACH | Refills: 0 | Status: SHIPPED | OUTPATIENT
Start: 2024-12-20

## 2024-12-20 NOTE — PROGRESS NOTES
HPI:    Patient ID: Krupa Stock is a 29 year old female.    HPI     Patient here in office with complaint of cough, chest tightness and breathing difficulty, started 1 week ago.  Denies wheezing.  Using Breo inhaler as prescribed with mild improvement.  Using albuterol 4 times daily, provides mild relief.  Interested in receiving prescription for albuterol nebulizer.        Review of Systems   Constitutional: Negative.    Respiratory:  Positive for cough, chest tightness and shortness of breath. Negative for wheezing.    Cardiovascular: Negative.    Skin: Negative.    Neurological: Negative.    Psychiatric/Behavioral: Negative.              Current Outpatient Medications   Medication Sig Dispense Refill    albuterol (2.5 MG/3ML) 0.083% Inhalation Nebu Soln Take 3 mL (2.5 mg total) by nebulization every 6 (six) hours as needed for Wheezing. 84 mL 0    Respiratory Therapy Supplies (NEBULIZER/TUBING/MOUTHPIECE) Does not apply Kit Use with Albuterol nebulizer as needed 1 each 0    Omeprazole 40 MG Oral Capsule Delayed Release Take 1 capsule (40 mg total) by mouth daily. Take 1 capsule by mouth daily before breakfast. 90 capsule 3    montelukast 10 MG Oral Tab Take 1 tablet (10 mg total) by mouth every evening. 90 tablet 3    EPINEPHrine 0.3 MG/0.3ML Injection Solution Auto-injector INJECT INTRAMUSCULARLY IN EVENT OF  ALLERGIC REACTION 2 each 0    fluticasone furoate-vilanterol (BREO ELLIPTA) 200-25 MCG/ACT Inhalation Aerosol Powder, Breath Activated Inhale 1 puff into the lungs daily. 3 each 3    levocetirizine 5 MG Oral Tab Take 1 tablet (5 mg total) by mouth every morning. 90 tablet 3    albuterol 108 (90 Base) MCG/ACT Inhalation Aero Soln Inhale 2 puffs into the lungs every 4 (four) hours as needed for Wheezing or Shortness of Breath. 18 g 1    meclizine 25 MG Oral Tab Take 1 tablet (25 mg total) by mouth 3 (three) times daily as needed. 45 tablet 0    Multiple Vitamins-Minerals (MULTIVITAMIN ADULT OR) Take  by mouth.      levoFLOXacin (LEVAQUIN) 750 MG Oral Tab Take 1 tablet (750 mg total) by mouth daily for 5 days. 5 tablet 0    predniSONE 20 MG Oral Tab Take 1 tablet (20 mg total) by mouth in the morning, at noon, and at bedtime for 5 days. 15 tablet 0     Allergies:Allergies[1]   /82   Pulse 74   Temp 97 °F (36.1 °C) (Temporal)   Resp 16   Ht 5' 4\" (1.626 m)   Wt 228 lb (103.4 kg)   LMP 11/28/2024 (Approximate)   SpO2 100%   BMI 39.14 kg/m²   Body mass index is 39.14 kg/m².  PHYSICAL EXAM:   Physical Exam  Vitals reviewed.   Constitutional:       General: She is not in acute distress.     Appearance: Normal appearance. She is not ill-appearing.   HENT:      Right Ear: Tympanic membrane, ear canal and external ear normal. There is no impacted cerumen.      Left Ear: Tympanic membrane, ear canal and external ear normal. There is no impacted cerumen.      Nose: Nose normal. No congestion.      Mouth/Throat:      Mouth: Mucous membranes are moist.      Pharynx: No oropharyngeal exudate or posterior oropharyngeal erythema.   Eyes:      General:         Right eye: No discharge.         Left eye: No discharge.      Conjunctiva/sclera: Conjunctivae normal.   Cardiovascular:      Rate and Rhythm: Normal rate and regular rhythm.   Pulmonary:      Effort: Pulmonary effort is normal. No respiratory distress.      Breath sounds: No stridor. Wheezing present. No rhonchi or rales.      Comments: Decreased breath sounds bilateral lobes with inspiratory wheezing  Chest:      Chest wall: No tenderness.   Musculoskeletal:      Cervical back: Neck supple.   Lymphadenopathy:      Cervical: No cervical adenopathy.   Skin:     General: Skin is warm.      Findings: No rash.   Neurological:      General: No focal deficit present.      Mental Status: She is alert and oriented to person, place, and time.   Psychiatric:         Mood and Affect: Mood normal.         Behavior: Behavior normal.         Thought Content: Thought  content normal.         Judgment: Judgment normal.                ASSESSMENT/PLAN:   1. Acute cough  - XR CHEST PA + LAT CHEST (CPT=71046); Future    2. Exacerbation of asthma, unspecified asthma severity, unspecified whether persistent (HCC)  - predniSONE 20 MG Oral Tab, Take 2 tablets (40 mg total) by mouth daily for 5 days   - albuterol (2.5 MG/3ML) 0.083% Inhalation Nebu Soln, Take 3 mL (2.5 mg total) by nebulization every 6 (six) hours as needed for Wheezing.   -Go to ER for worsening symptoms    3. Poor appetite  - XR CHEST PA + LAT CHEST (CPT=71046); Future    4. Moderate persistent asthma, unspecified whether complicated (HCC)  - Pulmonary Referral - In Network      No orders of the defined types were placed in this encounter.      Meds This Visit:  Requested Prescriptions     Signed Prescriptions Disp Refills    predniSONE 20 MG Oral Tab 10 tablet 0     Sig: Take 2 tablets (40 mg total) by mouth daily for 5 days.    albuterol (2.5 MG/3ML) 0.083% Inhalation Nebu Soln 84 mL 0     Sig: Take 3 mL (2.5 mg total) by nebulization every 6 (six) hours as needed for Wheezing.    Respiratory Therapy Supplies (NEBULIZER/TUBING/MOUTHPIECE) Does not apply Kit 1 each 0     Sig: Use with Albuterol nebulizer as needed       Imaging & Referrals:  PULMONARY - INTERNAL         ID#[id number]         [1]   Allergies  Allergen Reactions    Dairy Products SHORTNESS OF BREATH    Dust Mite Extract ANAPHYLAXIS    Cat Hair Extract UNKNOWN    Mold UNKNOWN    Cephalexin RASH and ITCHING    Penicillins RASH

## 2024-12-20 NOTE — TELEPHONE ENCOUNTER
Action Requested: Summary for Provider     []  Critical Lab, Recommendations Needed  [] Need Additional Advice  [x]   FYI    []   Need Orders  [] Need Medications Sent to Pharmacy  []  Other     SUMMARY: Patient missed her appointment this morning with Dr. Mckeon. Patient states she has to use her inhaler more often than usual. \"Lungs hurt\" per Patient. Denies fever. Reports congestion. Patient does not want to go to Urgent care due to cost. Patient states she may need a nebulizer treatment. Rescheduled with GAIL Marc today. Patient states she is able to come by 11am.      Reason for call: Breathing Problem  Onset: Data Unavailable                     Reason for Disposition   Quick-relief asthma medicine (e.g., albuterol /salbutamol, levalbuterol by inhaler or nebulizer) is needed more frequently than every 4 hours to keep you comfortable    Protocols used: Asthma Attack-A-[place]

## 2024-12-21 ENCOUNTER — HOSPITAL ENCOUNTER (OUTPATIENT)
Dept: GENERAL RADIOLOGY | Age: 29
Discharge: HOME | End: 2024-12-21
Payer: COMMERCIAL

## 2024-12-21 DIAGNOSIS — R05.1 ACUTE COUGH: ICD-10-CM

## 2024-12-21 DIAGNOSIS — R63.0 POOR APPETITE: ICD-10-CM

## 2024-12-21 PROCEDURE — 71046 X-RAY EXAM CHEST 2 VIEWS: CPT

## 2024-12-24 ENCOUNTER — OFFICE VISIT (OUTPATIENT)
Dept: FAMILY MEDICINE CLINIC | Facility: CLINIC | Age: 29
End: 2024-12-24
Payer: COMMERCIAL

## 2024-12-24 VITALS
RESPIRATION RATE: 14 BRPM | DIASTOLIC BLOOD PRESSURE: 83 MMHG | HEART RATE: 80 BPM | SYSTOLIC BLOOD PRESSURE: 138 MMHG | WEIGHT: 244 LBS | BODY MASS INDEX: 41.66 KG/M2 | HEIGHT: 64 IN | OXYGEN SATURATION: 98 %

## 2024-12-24 DIAGNOSIS — R05.1 ACUTE COUGH: Primary | ICD-10-CM

## 2024-12-24 DIAGNOSIS — J01.90 ACUTE NON-RECURRENT SINUSITIS, UNSPECIFIED LOCATION: ICD-10-CM

## 2024-12-24 DIAGNOSIS — J45.901 EXACERBATION OF ASTHMA, UNSPECIFIED ASTHMA SEVERITY, UNSPECIFIED WHETHER PERSISTENT (HCC): ICD-10-CM

## 2024-12-24 PROCEDURE — 99214 OFFICE O/P EST MOD 30 MIN: CPT | Performed by: FAMILY MEDICINE

## 2024-12-24 RX ORDER — PREDNISONE 20 MG/1
20 TABLET ORAL 3 TIMES DAILY
Qty: 15 TABLET | Refills: 0 | Status: SHIPPED | OUTPATIENT
Start: 2024-12-24 | End: 2024-12-29

## 2024-12-24 RX ORDER — LEVOFLOXACIN 750 MG/1
750 TABLET, FILM COATED ORAL DAILY
Qty: 5 TABLET | Refills: 0 | Status: SHIPPED | OUTPATIENT
Start: 2024-12-24 | End: 2024-12-29

## 2024-12-24 NOTE — PROGRESS NOTES
Blood pressure 138/83, pulse 80, resp. rate 14, height 5' 4\" (1.626 m), weight 244 lb (110.7 kg), last menstrual period 11/28/2024, SpO2 98%, not currently breastfeeding.      2-month history of has been feeling worse more trouble breathing more inhaler use.  Some coughing during the day no nocturnal cough no phlegm production green or bloody nasal discharge.  Some sore throat with bilateral ear pain no fever no chills.  Some headache with facial frontal pressure no bad breath or heartburn.  Upper teeth are painful some watery and itchy eyes.  No smoking history of asthma.  Denies any sexual activity has a dog.  COVID was negative at home.    Objective throat erythematous no exudate ears with TMs intact no redness    Lungs clear to auscultation without rales rhonchi or wheezes    No nasal flaring no chest retractions    Comfortable no apparent distress    Assessment asthma exacerbation with sinusitis    Plan increase prednisone to 3 times daily and Levaquin prescription given follow-up with allergist as scheduled    GO TO ER IF SYMPTOMS PERSIST OR WORSEN.

## 2024-12-25 LAB — SARS-COV-2 RNA RESP QL NAA+PROBE: NOT DETECTED

## 2025-01-14 ENCOUNTER — HOSPITAL ENCOUNTER (OUTPATIENT)
Dept: CT IMAGING | Facility: HOSPITAL | Age: 30
Discharge: HOME | End: 2025-01-14
Attending: FAMILY MEDICINE
Payer: COMMERCIAL

## 2025-01-14 ENCOUNTER — HOSPITAL ENCOUNTER (OUTPATIENT)
Dept: GENERAL RADIOLOGY | Facility: HOSPITAL | Age: 30
Discharge: HOME | End: 2025-01-14
Attending: FAMILY MEDICINE
Payer: COMMERCIAL

## 2025-01-14 ENCOUNTER — TELEPHONE (OUTPATIENT)
Dept: FAMILY MEDICINE CLINIC | Facility: CLINIC | Age: 30
End: 2025-01-14

## 2025-01-14 ENCOUNTER — OFFICE VISIT (OUTPATIENT)
Dept: FAMILY MEDICINE CLINIC | Facility: CLINIC | Age: 30
End: 2025-01-14
Payer: COMMERCIAL

## 2025-01-14 VITALS
DIASTOLIC BLOOD PRESSURE: 80 MMHG | TEMPERATURE: 98 F | SYSTOLIC BLOOD PRESSURE: 120 MMHG | BODY MASS INDEX: 41.11 KG/M2 | HEIGHT: 64 IN | RESPIRATION RATE: 18 BRPM | OXYGEN SATURATION: 97 % | HEART RATE: 102 BPM | WEIGHT: 240.81 LBS

## 2025-01-14 DIAGNOSIS — R05.1 ACUTE COUGH: ICD-10-CM

## 2025-01-14 DIAGNOSIS — M79.10 MUSCLE ACHE: Primary | ICD-10-CM

## 2025-01-14 DIAGNOSIS — R10.31 RLQ ABDOMINAL PAIN: ICD-10-CM

## 2025-01-14 LAB
CONTROL LINE PRESENT WITH A CLEAR BACKGROUND (YES/NO): YES YES/NO
CREAT BLD-MCNC: 0.8 MG/DL
EGFRCR SERPLBLD CKD-EPI 2021: 101 ML/MIN/1.73M2 (ref 60–?)
KIT LOT #: NORMAL NUMERIC
PREGNANCY TEST, URINE: NEGATIVE

## 2025-01-14 PROCEDURE — 71046 X-RAY EXAM CHEST 2 VIEWS: CPT | Performed by: FAMILY MEDICINE

## 2025-01-14 PROCEDURE — 74177 CT ABD & PELVIS W/CONTRAST: CPT | Performed by: FAMILY MEDICINE

## 2025-01-14 PROCEDURE — 99214 OFFICE O/P EST MOD 30 MIN: CPT | Performed by: FAMILY MEDICINE

## 2025-01-14 PROCEDURE — 81025 URINE PREGNANCY TEST: CPT | Performed by: FAMILY MEDICINE

## 2025-01-14 PROCEDURE — 82565 ASSAY OF CREATININE: CPT

## 2025-01-14 RX ORDER — OSELTAMIVIR PHOSPHATE 75 MG/1
75 CAPSULE ORAL 2 TIMES DAILY
Qty: 10 CAPSULE | Refills: 0 | Status: SHIPPED | OUTPATIENT
Start: 2025-01-14 | End: 2025-01-19

## 2025-01-14 NOTE — PROGRESS NOTES
Blood pressure 120/80, pulse 102, temperature 97.9 °F (36.6 °C), temperature source Temporal, resp. rate 18, height 5' 4\" (1.626 m), weight 240 lb 12.8 oz (109.2 kg), last menstrual period 11/28/2024, SpO2 97%, not currently breastfeeding.          3-day history of cough with yellow to green phlegm and yellow nasal congestion.  Has a sore throat with dysphonia and dysphagia.  Bilateral ear pain.  No fever no chills.  Some dyspnea.  Also with tooth pain and a stomachache.  Headache.  Facial frontal head pain.  No bad breath she has sneezing, watery and itchy eyes.  Did not lose her voice.  Had diarrhea a week ago.  3 days ago started with vomiting.  No vomiting for the past 2 days.  She has not been eating.  No bowel movement today.  Had diarrhea yesterday.  Had antibiotics 3 weeks ago.  No recent foreign travel or reptile pets.  Has used Tylenol with minimal relief.  She is not sexually active.  She has asthma she does not smoke.    Objective    Ears with TMs intact no redness    Throat clear not erythematous    Lungs clear to auscultation patient with coughing paroxysms during lung exam    Abdomen with decreased bowel sounds there is tenderness in the right lower quadrant and at McBurney's point    There is left lower quadrant tenderness    1. Muscle ache  We will check for flu  - [address] Thru Flu Test (Rapid Flu); Future    2. RLQ abdominal pain  CT ordered stat  - CT ABDOMEN+PELVIS(CONTRAST ONLY)(CPT=74177); Future  - Urine Pregnancy Test    3. Acute cough  History of asthma  - XR CHEST PA + LAT CHEST (CPT=71046); Future

## 2025-01-14 NOTE — TELEPHONE ENCOUNTER
Name and  verified.    Dr. Mckeon please advise, the patient went for her flu test and she was positive for influenza A.     patient is asking for a note for work to state she was in the office today. Pended note for your signature if appropriate.

## 2025-01-15 NOTE — TELEPHONE ENCOUNTER
Spoke to patient. Verified patient received doctors note. Patient also stated she picked up prescription and has taken first two pills. Advised rest and push fluids and call back if she needs anything.

## 2025-01-17 RX ORDER — ALBUTEROL SULFATE 0.83 MG/ML
2.5 SOLUTION RESPIRATORY (INHALATION) EVERY 6 HOURS PRN
Qty: 180 ML | Refills: 5 | Status: SHIPPED | OUTPATIENT
Start: 2025-01-17

## 2025-01-17 NOTE — TELEPHONE ENCOUNTER
Please review; protocol failed/ has no protocol    Requested Prescriptions   Pending Prescriptions Disp Refills    ALBUTEROL (2.5 MG/3ML) 0.083% Inhalation Nebu Soln [Pharmacy Med Name: Albuterol Sulfate 0.083 % Neb Kavya] 180 mL 0     Sig: Take 3 mL (2.5 mg total) by nebulization every 6 (six) hours as needed for Wheezing.       Asthma & COPD Medication Protocol Failed - 1/17/2025  2:51 PM        Failed - ACT Score greater than or equal to 20     7 (12/20/2024 11:14 AM)            Passed - Appointment in past 6 or next 3 months      Recent Outpatient Visits              3 days ago Muscle ache    [facility], [place] Cespedes, David B, DO    Office Visit    3 weeks ago Acute cough    [facility], [place] Cespedes, David B, DO    Office Visit    4 weeks ago Acute cough    [facility] Tatyana Cummings APRN    Office Visit    8 months ago Mild asthma with exacerbation, unspecified whether persistent ([facility])    [facility] [place] Cespedes, David B, DO    Office Visit    11 months ago Eosinophilic esophagitis    [facility]    Nurse Only          Future Appointments         Provider Department Appt Notes    In 1 month Isaac Gottlieb MD [facility] allergic reactions to foods.                    Passed - ACT recorded in the last 12 months     7 (12/20/2024 11:14 AM)            Passed - Medication is active on med list           Recent Outpatient Visits              3 days ago Muscle ache    [facility], [place] Cespedes, David B, DO    Office Visit    3 weeks ago Acute cough    [facility], [place] Cespedes, David B, DO    Office Visit    4 weeks ago Acute cough    [facility] Zoila  TIFFANY oJe    Office Visit    8 months ago Mild asthma with exacerbation, unspecified whether persistent (HCC)    [facility], [place] Cespedes, David B, DO    Office Visit    11 months ago Eosinophilic esophagitis    [facility]    Nurse Only          Future Appointments         Provider Department Appt Notes    In 1 month Isaac Gottlieb MD [facility] allergic reactions to foods.

## 2025-01-21 ENCOUNTER — OFFICE VISIT (OUTPATIENT)
Dept: FAMILY MEDICINE CLINIC | Facility: CLINIC | Age: 30
End: 2025-01-21
Payer: COMMERCIAL

## 2025-01-21 VITALS
DIASTOLIC BLOOD PRESSURE: 86 MMHG | BODY MASS INDEX: 37.39 KG/M2 | HEIGHT: 64 IN | WEIGHT: 219 LBS | SYSTOLIC BLOOD PRESSURE: 120 MMHG | TEMPERATURE: 97 F | HEART RATE: 93 BPM

## 2025-01-21 DIAGNOSIS — J45.901 EXACERBATION OF ASTHMA, UNSPECIFIED ASTHMA SEVERITY, UNSPECIFIED WHETHER PERSISTENT (HCC): Primary | ICD-10-CM

## 2025-01-21 PROCEDURE — 99214 OFFICE O/P EST MOD 30 MIN: CPT | Performed by: FAMILY MEDICINE

## 2025-01-21 RX ORDER — CODEINE PHOSPHATE AND GUAIFENESIN 10; 100 MG/5ML; MG/5ML
10 SOLUTION ORAL EVERY 6 HOURS PRN
Qty: 240 ML | Refills: 0 | Status: SHIPPED | OUTPATIENT
Start: 2025-01-21

## 2025-01-21 RX ORDER — DOXYCYCLINE HYCLATE 100 MG
100 TABLET ORAL 2 TIMES DAILY
Qty: 20 TABLET | Refills: 0 | Status: SHIPPED | OUTPATIENT
Start: 2025-01-21

## 2025-01-21 RX ORDER — PREDNISONE 20 MG/1
TABLET ORAL
Qty: 15 TABLET | Refills: 0 | Status: SHIPPED | OUTPATIENT
Start: 2025-01-21

## 2025-01-21 NOTE — PROGRESS NOTES
Blood pressure 120/86, pulse 93, temperature 97.4 °F (36.3 °C), temperature source Temporal, height 5' 4\" (1.626 m), weight 219 lb (99.3 kg), last menstrual period 11/28/2024, not currently breastfeeding.          Patient presents today following up for coughing that persists no difficulty breathing she has nasal congestion.  Had diarrhea yesterday has not had diarrhea today.  Was treated for the flu recently.  She has not had a flu vaccine.    Coughing at night.  Minimal head pain.  Some frontal facial headache.    Objective    Coughing paroxysms during exam lungs clear to auscultation no rales rhonchi or wheezes    Throat clear not erythematous    Assessment asthma exacerbation sinusitis    Plan doxycycline prescription also prednisone prescription and Robitussin with codeine    MEDICATION MAY CAUSE DROWSINESS.  DO NOT DRIVE OR OPERATE HEAVY MACHINERY.      RISKS AND BENEFITS EXPLAINED.   GO TO ER IF SYMPTOMS PERSIST OR WORSEN.

## 2025-01-22 ENCOUNTER — TELEPHONE (OUTPATIENT)
Dept: FAMILY MEDICINE CLINIC | Facility: CLINIC | Age: 30
End: 2025-01-22

## 2025-01-22 NOTE — TELEPHONE ENCOUNTER
Dr. Mckeon - new note pended    Spoke to patient, full name and date of birth verified.  Patient was seen yesterday by Dr. Mckeon.   Her return to work note accidentally states patient can return on 1/4/25.     Patient requests new note with return to work date 1/24/25.     Patient stated sending the new note through [employer] works fine.

## 2025-02-20 RX ORDER — LEVOCETIRIZINE DIHYDROCHLORIDE 5 MG/1
5 TABLET, FILM COATED ORAL EVERY MORNING
Qty: 90 TABLET | Refills: 3 | Status: SHIPPED | OUTPATIENT
Start: 2025-02-20

## 2025-02-20 NOTE — TELEPHONE ENCOUNTER
Refill Per Protocol      LAST WRITTEN: 02/15/2024   Levocetirizine Dihydrochloride 5 mg Oral Every morning    Medication refill pended for your review/approval

## 2025-03-20 DIAGNOSIS — R06.02 SHORTNESS OF BREATH: ICD-10-CM

## 2025-03-26 ENCOUNTER — TELEPHONE (OUTPATIENT)
Dept: FAMILY MEDICINE CLINIC | Facility: CLINIC | Age: 30
End: 2025-03-26

## 2025-03-26 DIAGNOSIS — R06.02 SHORTNESS OF BREATH: ICD-10-CM

## 2025-03-26 RX ORDER — ALBUTEROL SULFATE 90 UG/1
2 INHALANT RESPIRATORY (INHALATION) EVERY 6 HOURS PRN
Qty: 8.5 G | Refills: 3 | Status: SHIPPED | OUTPATIENT
Start: 2025-03-26

## 2025-03-27 NOTE — TELEPHONE ENCOUNTER
The patient's drug benefit plan provides coverage for other drugs which may be considered for treating your patient. Can your patient be treated with a formulary drug? Available Formulary Alternatives: albuterol sulfate CFC-free aerosol (except NDCs [id number], [id number]), levalbuterol tartrate CFC-free aerosol [NOTE: If yes, provide your patient with a new prescription for the formulary product.

## 2025-03-31 DIAGNOSIS — R06.02 SHORTNESS OF BREATH: ICD-10-CM

## 2025-04-02 RX ORDER — ALBUTEROL SULFATE 90 UG/1
2 INHALANT RESPIRATORY (INHALATION) EVERY 6 HOURS PRN
Qty: 8.5 G | Refills: 0 | OUTPATIENT
Start: 2025-04-02

## 2025-04-04 NOTE — TELEPHONE ENCOUNTER
Patient did not view Clink message, third attempt calling no answer Lvm telling pt to give us a call back

## 2025-04-05 RX ORDER — ALBUTEROL SULFATE 90 UG/1
2 INHALANT RESPIRATORY (INHALATION) EVERY 6 HOURS PRN
Qty: 8.5 G | Refills: 3 | Status: SHIPPED | OUTPATIENT
Start: 2025-04-05

## 2025-06-13 ENCOUNTER — LAB ENCOUNTER (OUTPATIENT)
Dept: LAB | Age: 30
End: 2025-06-13
Attending: FAMILY MEDICINE
Payer: COMMERCIAL

## 2025-06-13 ENCOUNTER — HOSPITAL ENCOUNTER (OUTPATIENT)
Dept: GENERAL RADIOLOGY | Age: 30
Discharge: HOME | End: 2025-06-13
Attending: FAMILY MEDICINE
Payer: COMMERCIAL

## 2025-06-13 ENCOUNTER — OFFICE VISIT (OUTPATIENT)
Dept: FAMILY MEDICINE CLINIC | Facility: CLINIC | Age: 30
End: 2025-06-13
Payer: COMMERCIAL

## 2025-06-13 ENCOUNTER — PATIENT MESSAGE (OUTPATIENT)
Dept: FAMILY MEDICINE CLINIC | Facility: CLINIC | Age: 30
End: 2025-06-13

## 2025-06-13 VITALS
OXYGEN SATURATION: 98 % | HEIGHT: 64 IN | SYSTOLIC BLOOD PRESSURE: 122 MMHG | TEMPERATURE: 98 F | DIASTOLIC BLOOD PRESSURE: 78 MMHG | BODY MASS INDEX: 39.78 KG/M2 | HEART RATE: 75 BPM | WEIGHT: 233 LBS | RESPIRATION RATE: 16 BRPM

## 2025-06-13 DIAGNOSIS — J45.901 EXACERBATION OF ASTHMA, UNSPECIFIED ASTHMA SEVERITY, UNSPECIFIED WHETHER PERSISTENT (HCC): Primary | ICD-10-CM

## 2025-06-13 DIAGNOSIS — J45.901 EXACERBATION OF ASTHMA, UNSPECIFIED ASTHMA SEVERITY, UNSPECIFIED WHETHER PERSISTENT (HCC): ICD-10-CM

## 2025-06-13 PROCEDURE — 99214 OFFICE O/P EST MOD 30 MIN: CPT | Performed by: FAMILY MEDICINE

## 2025-06-13 PROCEDURE — 87637 SARSCOV2&INF A&B&RSV AMP PRB: CPT

## 2025-06-13 PROCEDURE — 71046 X-RAY EXAM CHEST 2 VIEWS: CPT | Performed by: FAMILY MEDICINE

## 2025-06-13 RX ORDER — DOXYCYCLINE HYCLATE 100 MG
100 TABLET ORAL 2 TIMES DAILY
Qty: 20 TABLET | Refills: 0 | Status: SHIPPED | OUTPATIENT
Start: 2025-06-13

## 2025-06-13 RX ORDER — PREDNISONE 20 MG/1
TABLET ORAL
Qty: 15 TABLET | Refills: 0 | Status: SHIPPED | OUTPATIENT
Start: 2025-06-13

## 2025-06-13 NOTE — PROGRESS NOTES
Subjective:   Krupa Stock is a 30 year old female who presents for Asthma       History/Other:   History of Present Illness  Krupa Stock is a 30 year old female with asthma who presents with worsening respiratory symptoms and cough.    Over the past week, Krupa has experienced worsening asthma symptoms, requiring increased use of nebulizer treatments and inhaler. She has significant chest pain upon waking, and her usual medications, including Breo, provide inconsistent relief. She sometimes uses her inhaler before bed, but it does not always work.    She has a new onset of coughing fits that began today. A recent incident involving dairy consumption led to severe coughing fits. She has a stuffy nose, sneezing, watery and itchy eyes, and a sensation of post-nasal drip. She experiences tightness in her chest and pain with deep breathing. Oxygen saturation is 98%.    She has a mild headache and occasional tooth pain. She has no appetite but is not vomiting.    Her medical history includes an allergy to penicillin, which previously caused breathing difficulties and a rash. She is currently taking Montelukast and LevoCetirizine for allergies. 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 She has one dog at home and is allergic to cats.   Chief Complaint Reviewed and Verified  No Further Nursing Notes to   Review  Tobacco Reviewed  Allergies Reviewed  Medications Reviewed    Medical History Reviewed  Surgical History Reviewed  OB Status Reviewed    Family History Reviewed  Social History Reviewed         Tobacco:  She has never smoked tobacco.    Current Medications[1]           Review of Systems:  Pertinent items are noted in HPI.      Objective:   /78   Pulse 75   Resp 16   Ht 5' 4\" (1.626 m)   Wt 233 lb (105.7 kg)   LMP 06/07/2025 (Exact Date)   SpO2 98%   BMI 39.99 kg/m²  Estimated body mass index is 39.99 kg/m² as calculated from the following:    Height as of this  encounter: 5' 4\" (1.626 m).    Weight as of this encounter: 233 lb (105.7 kg).  Results  LABS  Oxygen saturation: 98% (06/13/2025)     Physical Exam  VITALS: SaO2- 98%  HEENT: Normal oropharynx    LUNGS CTA COUGHING PAROXYSMS DURING EXAM   COMFORTABLE NAD   Assessment & Plan:   1. Exacerbation of asthma, unspecified asthma severity, unspecified whether persistent (HCC) (Primary)  -     ALLERGY - INTERNAL  -     SARS-COV-22-ALINITY; Future; Expected date: 06/13/2025  -     XR CHEST PA + LAT CHEST (CPT=71046); Future; Expected date: 06/13/2025  Other orders  -     Doxycycline Hyclate; Take 1 tablet (100 mg total) by mouth 2 (two) times daily.  Dispense: 20 tablet; Refill: 0  -     predniSONE; TAKE 1 TAB 3 TIMES DAILY  Dispense: 15 tablet; Refill: 0    Assessment & Plan  Asthma exacerbation  Experiencing an exacerbation characterized by increased nebulizer and inhaler use, chest tightness, and dyspnea, interfering with daily activities and sleep. Recent coughing fits post-dairy exposure suggest a possible allergic component. Current medications, Breo and Montelukast, are insufficient. Penicillin allergy limits antibiotic options. Doxycycline and prednisone prescribed. Advised to seek emergency care if breathing worsens.  - Prescribe doxycycline and prednisone.  - Advise to avoid work to prevent spreading potential infection.  - Refer to an allergist for further evaluation and management of asthma and potential allergies.  - Instruct to go to the ER if breathing worsens.    Upper respiratory infection  Symptoms include cough, rhinorrhea, sneezing, conjunctivitis, and headache. Cough onset today with postnasal drip. Afebrile with no chills. Differential includes COVID-19, RSV, and influenza. Nasopharyngeal swab ordered. Advised to avoid work until results are available.  - Order nasopharyngeal swab for COVID-19, RSV, and influenza testing.  - Advise to avoid work until test results are available.  - Provide a doctor's  note for work absence.    Allergic rhinitis  Symptoms include sneezing, conjunctivitis, and nasal congestion. Currently on Levocetirizine. Presence of a dog at home, denies canine allergy, reports feline allergy. Referral to an allergist recommended.  - Continue Levocetirizine.  - Refer to an allergist for further evaluation and management of allergies.    Recording duration: 6 minutes        No follow-ups on file.        Louis Mckeon DO, 6/13/2025, 11:07 AM             [1]   Current Outpatient Medications   Medication Sig Dispense Refill    Doxycycline Hyclate 100 MG Oral Tab Take 1 tablet (100 mg total) by mouth 2 (two) times daily. 20 tablet 0    predniSONE 20 MG Oral Tab TAKE 1 TAB 3 TIMES DAILY 15 tablet 0    albuterol 108 (90 Base) MCG/ACT Inhalation Aero Soln Inhale 2 puffs into the lungs every 6 (six) hours as needed. 8.5 g 3    levocetirizine 5 MG Oral Tab Take 1 tablet (5 mg total) by mouth every morning. 90 tablet 3    guaiFENesin-codeine 100-10 MG/5ML Oral Solution Take 10 mL by mouth every 6 (six) hours as needed. Do not operate heavy machinery or drive if this makes you tired. 240 mL 0    albuterol (2.5 MG/3ML) 0.083% Inhalation Nebu Soln Take 3 mL (2.5 mg total) by nebulization every 6 (six) hours as needed for Wheezing. 180 mL 5    Respiratory Therapy Supplies (NEBULIZER/TUBING/MOUTHPIECE) Does not apply Kit Use with Albuterol nebulizer as needed 1 each 0    Omeprazole 40 MG Oral Capsule Delayed Release Take 1 capsule (40 mg total) by mouth daily. Take 1 capsule by mouth daily before breakfast. 90 capsule 3    montelukast 10 MG Oral Tab Take 1 tablet (10 mg total) by mouth every evening. 90 tablet 3    EPINEPHrine 0.3 MG/0.3ML Injection Solution Auto-injector INJECT INTRAMUSCULARLY IN EVENT OF  ALLERGIC REACTION 2 each 0    fluticasone furoate-vilanterol (BREO ELLIPTA) 200-25 MCG/ACT Inhalation Aerosol Powder, Breath Activated Inhale 1 puff into the lungs daily. 3 each 3    meclizine 25 MG Oral  Tab Take 1 tablet (25 mg total) by mouth 3 (three) times daily as needed. 45 tablet 0

## 2025-06-13 NOTE — ED AVS SNAPSHOT
[facility] AND [facility] in [address] [address] Steve Hardin    Phone:  [phone]    Fax:  [phone]           Wolfgang Ferrer   MRN: [id number]    Department:  [facility] AND [facility] in [address] [place] [address]   Date of Visit:  1/ [facility]    Phone Message    May a detailed message be left on voicemail: yes     Reason for Call: Other: Pt is calling clinic back about getting a sooner appt, please return call thanks!     Action Taken: Other: [place] DERM    Travel Screening: Not Applicable     Date of Service:                                                                      service to you, we would appreciate any positive or negative feedback related to the care you received in our Immediate Care. Please call our [address] at [phone]. Your Immediate Care team is here to serve you. You are our top priority.     You w [address] [facility]  WEEKENDS AND HOLIDAYS 8AM - 6PM    I certified that I have received a copy of the aftercare instructions; that these instructions have been explained to me; all questions pertaining to these instructions have been answered

## 2025-06-14 LAB
FLUAV + FLUBV RNA SPEC NAA+PROBE: NEGATIVE
FLUAV + FLUBV RNA SPEC NAA+PROBE: NEGATIVE
RSV RNA SPEC NAA+PROBE: NEGATIVE
SARS-COV-2 RNA RESP QL NAA+PROBE: NOT DETECTED

## 2025-06-15 ENCOUNTER — APPOINTMENT (OUTPATIENT)
Dept: GENERAL RADIOLOGY | Facility: HOSPITAL | Age: 30
End: 2025-06-15
Payer: COMMERCIAL

## 2025-06-15 ENCOUNTER — HOSPITAL ENCOUNTER (EMERGENCY)
Facility: HOSPITAL | Age: 30
Discharge: HOME | End: 2025-06-15
Attending: EMERGENCY MEDICINE
Payer: COMMERCIAL

## 2025-06-15 VITALS
HEART RATE: 80 BPM | RESPIRATION RATE: 28 BRPM | DIASTOLIC BLOOD PRESSURE: 82 MMHG | SYSTOLIC BLOOD PRESSURE: 126 MMHG | TEMPERATURE: 98 F | OXYGEN SATURATION: 100 %

## 2025-06-15 DIAGNOSIS — J45.901 MILD ASTHMA WITH EXACERBATION, UNSPECIFIED WHETHER PERSISTENT (HCC): Primary | ICD-10-CM

## 2025-06-15 LAB
ALBUMIN SERPL-MCNC: 4.7 G/DL (ref 3.2–4.8)
ALBUMIN/GLOB SERPL: 2 {RATIO} (ref 1–2)
ALP LIVER SERPL-CCNC: 60 U/L (ref 37–98)
ALT SERPL-CCNC: 28 U/L (ref 10–49)
ANION GAP SERPL CALC-SCNC: 11 MMOL/L (ref 0–18)
AST SERPL-CCNC: 32 U/L (ref ?–34)
BASOPHILS # BLD AUTO: 0.02 X10(3) UL (ref 0–0.2)
BASOPHILS NFR BLD AUTO: 0.1 %
BILIRUB SERPL-MCNC: 0.4 MG/DL (ref 0.3–1.2)
BUN BLD-MCNC: 11 MG/DL (ref 9–23)
BUN/CREAT SERPL: 12.5 (ref 10–20)
CALCIUM BLD-MCNC: 9.3 MG/DL (ref 8.7–10.4)
CHLORIDE SERPL-SCNC: 108 MMOL/L (ref 98–112)
CO2 SERPL-SCNC: 23 MMOL/L (ref 21–32)
CREAT BLD-MCNC: 0.88 MG/DL (ref 0.55–1.02)
DEPRECATED RDW RBC AUTO: 40.7 FL (ref 35.1–46.3)
EGFRCR SERPLBLD CKD-EPI 2021: 89 ML/MIN/1.73M2 (ref 60–?)
EOSINOPHIL # BLD AUTO: 0.03 X10(3) UL (ref 0–0.7)
EOSINOPHIL NFR BLD AUTO: 0.2 %
ERYTHROCYTE [DISTWIDTH] IN BLOOD BY AUTOMATED COUNT: 13.2 % (ref 11–15)
FLUAV + FLUBV RNA SPEC NAA+PROBE: NEGATIVE
FLUAV + FLUBV RNA SPEC NAA+PROBE: NEGATIVE
GLOBULIN PLAS-MCNC: 2.4 G/DL (ref 2–3.5)
GLUCOSE BLD-MCNC: 137 MG/DL (ref 70–99)
HCT VFR BLD AUTO: 40.2 % (ref 35–48)
HGB BLD-MCNC: 12.9 G/DL (ref 12–16)
IMM GRANULOCYTES # BLD AUTO: 0.06 X10(3) UL (ref 0–1)
IMM GRANULOCYTES NFR BLD: 0.4 %
LYMPHOCYTES # BLD AUTO: 0.98 X10(3) UL (ref 1–4)
LYMPHOCYTES NFR BLD AUTO: 6 %
MCH RBC QN AUTO: 27 PG (ref 26–34)
MCHC RBC AUTO-ENTMCNC: 32.1 G/DL (ref 31–37)
MCV RBC AUTO: 84.1 FL (ref 80–100)
MONOCYTES # BLD AUTO: 0.58 X10(3) UL (ref 0.1–1)
MONOCYTES NFR BLD AUTO: 3.6 %
NEUTROPHILS # BLD AUTO: 14.61 X10 (3) UL (ref 1.5–7.7)
NEUTROPHILS # BLD AUTO: 14.61 X10(3) UL (ref 1.5–7.7)
NEUTROPHILS NFR BLD AUTO: 89.7 %
OSMOLALITY SERPL CALC.SUM OF ELEC: 296 MOSM/KG (ref 275–295)
PLATELET # BLD AUTO: 343 10(3)UL (ref 150–450)
POTASSIUM SERPL-SCNC: 4 MMOL/L (ref 3.5–5.1)
PROT SERPL-MCNC: 7.1 G/DL (ref 5.7–8.2)
RBC # BLD AUTO: 4.78 X10(6)UL (ref 3.8–5.3)
RSV RNA SPEC NAA+PROBE: NEGATIVE
SARS-COV-2 RNA RESP QL NAA+PROBE: NOT DETECTED
SODIUM SERPL-SCNC: 142 MMOL/L (ref 136–145)
TROPONIN I SERPL HS-MCNC: <3 NG/L (ref ?–34)
WBC # BLD AUTO: 16.3 X10(3) UL (ref 4–11)

## 2025-06-15 PROCEDURE — 80053 COMPREHEN METABOLIC PANEL: CPT | Performed by: EMERGENCY MEDICINE

## 2025-06-15 PROCEDURE — 0241U SARS-COV-2/FLU A AND B/RSV BY PCR (GENEXPERT): CPT | Performed by: EMERGENCY MEDICINE

## 2025-06-15 PROCEDURE — 93010 ELECTROCARDIOGRAM REPORT: CPT

## 2025-06-15 PROCEDURE — 84484 ASSAY OF TROPONIN QUANT: CPT

## 2025-06-15 PROCEDURE — 71045 X-RAY EXAM CHEST 1 VIEW: CPT

## 2025-06-15 PROCEDURE — 93005 ELECTROCARDIOGRAM TRACING: CPT

## 2025-06-15 PROCEDURE — 99284 EMERGENCY DEPT VISIT MOD MDM: CPT

## 2025-06-15 PROCEDURE — 87430 STREP A AG IA: CPT | Performed by: EMERGENCY MEDICINE

## 2025-06-15 PROCEDURE — 80053 COMPREHEN METABOLIC PANEL: CPT

## 2025-06-15 PROCEDURE — 99285 EMERGENCY DEPT VISIT HI MDM: CPT

## 2025-06-15 PROCEDURE — 87430 STREP A AG IA: CPT

## 2025-06-15 PROCEDURE — 85025 COMPLETE CBC W/AUTO DIFF WBC: CPT

## 2025-06-15 PROCEDURE — 36415 COLL VENOUS BLD VENIPUNCTURE: CPT

## 2025-06-15 PROCEDURE — 85025 COMPLETE CBC W/AUTO DIFF WBC: CPT | Performed by: EMERGENCY MEDICINE

## 2025-06-15 PROCEDURE — 84484 ASSAY OF TROPONIN QUANT: CPT | Performed by: EMERGENCY MEDICINE

## 2025-06-15 PROCEDURE — 0241U SARS-COV-2/FLU A AND B/RSV BY PCR (GENEXPERT): CPT

## 2025-06-15 RX ORDER — IPRATROPIUM BROMIDE AND ALBUTEROL SULFATE 2.5; .5 MG/3ML; MG/3ML
3 SOLUTION RESPIRATORY (INHALATION) ONCE
Status: COMPLETED | OUTPATIENT
Start: 2025-06-15 | End: 2025-06-15

## 2025-06-16 LAB
ATRIAL RATE: 79 BPM
P AXIS: 46 DEGREES
P-R INTERVAL: 146 MS
Q-T INTERVAL: 370 MS
QRS DURATION: 86 MS
QTC CALCULATION (BEZET): 424 MS
R AXIS: 22 DEGREES
T AXIS: 45 DEGREES
VENTRICULAR RATE: 79 BPM

## 2025-06-16 NOTE — ED PROVIDER NOTES
Patient Seen in: [facility] Emergency Department        History  Chief Complaint   Patient presents with    Difficulty Breathing    Chest Pain Angina     Stated Complaint: terrence, chest tightness, asthma,    Subjective:   HPI            Patient is a 30-year-old female who presents with 3 days of headache, cough, difficulty breathing and sore throat.  Positive fatigue.  No known sick contacts.  No fevers.  Has taken 3 days of doxycycline and prednisone as well as nebulizers with no relief at home.  No history of hospitalizations for her asthma.  Denies smoking.      Objective:     Past Medical History:    Asthma (HCC)    COVID-19    Depression    Eosinophilic esophagitis    Esophageal reflux    Gastritis              Past Surgical History:   Procedure Laterality Date    Cholecystectomy  12/01/2015    Gastro - internal  02/2021    Other      left ankle    Removal gallbladder      Grant teeth removed                  Social History     Socioeconomic History    Marital status: Single   Tobacco Use    Smoking status: Never     Passive exposure: Never    Smokeless tobacco: Never    Tobacco comments:     Mom smoked when she was growing up.     Vaping Use    Vaping status: Never Used   Substance and Sexual Activity    Alcohol use: Yes     Comment: occasional    Drug use: No                                Physical Exam    ED Triage Vitals [06/15/25 1924]   /86   Pulse 83   Resp 20   Temp 98.3 °F (36.8 °C)   Temp src Oral   SpO2 99 %   O2 Device None (Room air)       Current Vitals:   Vital Signs  BP: 151/86  Pulse: 65  Resp: 14  Temp: 98.3 °F (36.8 °C)  Temp src: Oral    Oxygen Therapy  SpO2: 97 %  O2 Device: None (Room air)            Physical Exam  GENERAL: No acute distress, awake and alert  HEENT: EOMI, PERRL, oropharynx normal  Neck: supple  CV: RRR, no murmurs  Resp: trace expiratory wheezing with cough  Ab: soft, nontender, no distension  Extremities: FROM of all extremities  Neuro: CN intact, normal  speech, normal gait, 5/5 motor strength in all extremities, no focal deficits  SKIN: warm, dry, no rashes          ED Course  Labs Reviewed   CBC WITH DIFFERENTIAL WITH PLATELET - Abnormal; Notable for the following components:       Result Value    WBC 16.3 (*)     Neutrophil Absolute Prelim 14.61 (*)     Neutrophil Absolute 14.61 (*)     Lymphocyte Absolute 0.98 (*)     All other components within normal limits   COMP METABOLIC PANEL (14) - Abnormal; Notable for the following components:    Glucose 137 (*)     Calculated Osmolality 296 (*)     All other components within normal limits   TROPONIN I HIGH SENSITIVITY - Normal   SARS-COV-2/FLU A AND B/RSV BY PCR (GENEXPERT) - Normal    Narrative:     This test is intended for the qualitative detection and differentiation of SARS-CoV-2, influenza A, influenza B, and respiratory syncytial virus (RSV) viral RNA in nasopharyngeal or nares swabs from individuals suspected of respiratory viral infection consistent with COVID-19 by their healthcare provider. Signs and symptoms of respiratory viral infection due to SARS-CoV-2, influenza, and RSV can be similar.    Test performed using the Xpert Xpress SARS-CoV-2/FLU/RSV (real time RT-PCR)  assay on the [employer] instrument, [employer], [place] [address].   This test is being used under the Food and Drug Administration's Emergency Use Authorization.    The authorized Fact Sheet for Healthcare Providers for this assay is available upon request from the laboratory.   RAPID STREP A SCREEN (LC) - Normal    Narrative:     A confirmatory culture is recommended if clinically indicated.   RAINBOW DRAW LAVENDER   RAINBOW DRAW LIGHT GREEN   RAINBOW DRAW BLUE     EKG    Rate, intervals and axes as noted on EKG Report.  Rate: 79  Rhythm: Sinus Rhythm  Reading: no REBECCA, normal EKG                    MDM       Medical Decision Making  Patient feels improved after neb treatment.  On reexam, she is in no respiratory distress and speaking in  full sentences.  Vital stable.  Lungs clear.  She does feel comfortable going home at this time.  Advised on return precautions.  Suspect bronchitis with acute asthma exacerbation.  Labs remarkable for slight elevation in white blood cell count that may be secondary to steroids and infectious etiology.    Amount and/or Complexity of Data Reviewed  External Data Reviewed: radiology and notes.     Details: Recent cxr, pcp notes 6/2025 reviewed  Labs: ordered.  Radiology: ordered and independent interpretation performed.     Details: Cxr reviewed by me as no obvious infiltrate    X-RAY CHEST: 1 VIEW AP     COMPARISON: 2/16/2022     IMPRESSION:     No radiographic acute cardiopulmonary process.  Low volumes.  No focal consolidation. No pneumothorax. Normal heart size and mediastinal contours. Upper abdomen unremarkable.          Disposition and Plan     Clinical Impression:  1. Mild asthma with exacerbation, unspecified whether persistent (HCC)         Disposition:  Discharge  6/15/2025 11:33 pm    Follow-up:  Louis Mckeon, DO  [address] [facility] [address]  [place] [address]  [phone]    Follow up in 1 week(s)            Medications Prescribed:  Current Discharge Medication List                Supplementary Documentation:

## 2025-06-16 NOTE — ED INITIAL ASSESSMENT (HPI)
Pt to ED for chest pain and sob on and off for the past week, but has gotten worse today. Pt denies dizziness or nausea.

## 2025-06-16 NOTE — ED QUICK NOTES
Patient requesting to perform own strep swab d/t sensitive gag reflex and anxiety. Patient allowed to swab herself in presence of staff. Samples sent to lab.

## 2025-07-01 ENCOUNTER — OFFICE VISIT (OUTPATIENT)
Dept: FAMILY MEDICINE CLINIC | Facility: CLINIC | Age: 30
End: 2025-07-01
Payer: COMMERCIAL

## 2025-07-01 ENCOUNTER — TELEPHONE (OUTPATIENT)
Dept: ALLERGY | Facility: CLINIC | Age: 30
End: 2025-07-01

## 2025-07-01 ENCOUNTER — LAB ENCOUNTER (OUTPATIENT)
Dept: LAB | Age: 30
End: 2025-07-01
Attending: FAMILY MEDICINE
Payer: COMMERCIAL

## 2025-07-01 VITALS
WEIGHT: 212 LBS | SYSTOLIC BLOOD PRESSURE: 124 MMHG | HEIGHT: 64 IN | RESPIRATION RATE: 18 BRPM | DIASTOLIC BLOOD PRESSURE: 82 MMHG | BODY MASS INDEX: 36.19 KG/M2 | HEART RATE: 77 BPM | OXYGEN SATURATION: 99 %

## 2025-07-01 DIAGNOSIS — J45.40 MODERATE PERSISTENT ASTHMA, UNSPECIFIED WHETHER COMPLICATED (HCC): Primary | ICD-10-CM

## 2025-07-01 DIAGNOSIS — J45.40 MODERATE PERSISTENT ASTHMA, UNSPECIFIED WHETHER COMPLICATED (HCC): ICD-10-CM

## 2025-07-01 LAB
CONTROL LINE PRESENT WITH A CLEAR BACKGROUND (YES/NO): YES YES/NO
KIT LOT #: NORMAL NUMERIC
STREP GRP A CUL-SCR: NEGATIVE

## 2025-07-01 PROCEDURE — 87880 STREP A ASSAY W/OPTIC: CPT | Performed by: FAMILY MEDICINE

## 2025-07-01 PROCEDURE — 99214 OFFICE O/P EST MOD 30 MIN: CPT | Performed by: FAMILY MEDICINE

## 2025-07-01 PROCEDURE — 87637 SARSCOV2&INF A&B&RSV AMP PRB: CPT

## 2025-07-01 RX ORDER — PREDNISONE 20 MG/1
TABLET ORAL
Qty: 15 TABLET | Refills: 0 | Status: SHIPPED | OUTPATIENT
Start: 2025-07-01

## 2025-07-01 NOTE — PROGRESS NOTES
Subjective:   Krupa Stock is a 30 year old female who presents for Shortness Of Breath (Chest tightness)       History/Other:   History of Present Illness  Krupa Stock is a 30 year old female with multiple recent asthma exacerbations who presents with shortness of breath and nasal congestion.    She experiences shortness of breath for three days, triggered by minimal exertion, talking, and movement, especially after work. She uses a nebulizer and sleeps with three pillows for relief. Lung pain occurs with movement. She uses a Breo inhaler, levocetirizine, and montelukast for asthma control. The Breo inhaler is less effective than before. She has used prednisone previously.    Nasal congestion with greenish-yellow discharge persists despite doxycycline use. Ear pain and throat discomfort make talking painful.    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 No fever, pregnancy, smoking, or drug use.   Chief Complaint Reviewed and Verified  Nursing Notes Reviewed and   Verified  Tobacco Reviewed  Allergies Reviewed  Medications Reviewed    Medical History Reviewed  Surgical History Reviewed  Family History   Reviewed  Social History Reviewed         Tobacco:  She has never smoked tobacco.    Current Medications[1]           Review of Systems:  Pertinent items are noted in HPI.      Objective:   /82 (BP Location: Left arm, Patient Position: Sitting, Cuff Size: large)   Pulse 77   Resp 18   Ht 5' 4\" (1.626 m)   Wt 212 lb (96.2 kg)   LMP 06/07/2025 (Exact Date)   SpO2 99%   BMI 36.39 kg/m²  Estimated body mass index is 36.39 kg/m² as calculated from the following:    Height as of this encounter: 5' 4\" (1.626 m).    Weight as of this encounter: 212 lb (96.2 kg).  Results  Procedure: Throat Swab for Streptococcal  Pharyngitis  Description: Patient performed self-swab of the throat. Throat is clear, non-erythematous.    RADIOLOGY  Chest x-ray: Bronchitis (06/29/2025)     Physical Exam  HEENT: Throat clear, non-erythematous.  CHEST: Lungs clear to auscultation, coughing paroxysms on deep breath.      Assessment & Plan:   1. Moderate persistent asthma, unspecified whether complicated (HCC) (Primary)  -     SARS-COV-22-ALINITY; Future; Expected date: 07/01/2025  Other orders  -     predniSONE; TAKE 1 TAB 3 TIMES DAILY  Dispense: 15 tablet; Refill: 0    Assessment & Plan  Asthma exacerbation  Experiencing multiple recent exacerbations with dyspnea, especially with exertion, and coughing paroxysms, interfering with daily activities and work. Currently on maximum dose of Breo Ellipta, levocetirizine, and montelukast for long-term control, with decreased relief from Breo Ellipta. Prednisone is the only effective treatment for acute exacerbations.  - Continue Breo Ellipta, levocetirizine, and montelukast as prescribed  - Initiate prednisone for acute exacerbation  - Advise to avoid work if symptoms worsen and consider ER visit if necessary  - Expedite allergist appointment for further evaluation    Upper respiratory infection  Presents with symptoms including green-yellow nasal discharge and otalgia. No fever reported. Recently prescribed doxycycline. Nasal swab to rule out contagious infection and strep throat swab due to throat discomfort are planned.  - Perform nasal swab to rule out contagious infection  - Consider strep throat swab due to throat discomfort    Allergic rhinitis  Currently taking levocetirizine. Allergic to cats but not dogs and does not smoke. Attempting to expedite allergist appointment for further evaluation.  - Continue levocetirizine as prescribed  - Expedite allergist appointment for further evaluation contacted Dr. Gottlieb    Contraceptive counseling  Discussed contraceptive options. Advised against oral  contraceptives due to thromboembolic risk and potential severe side effects. Recommended considering IUDs, condoms, or abstinence as alternatives.  - Discuss contraceptive options further when she feels better    Follow-up  Requires follow-up to assess response to treatment and further management of asthma and potential infections.  - Schedule follow-up appointment in one week  - Advise to contact the office if symptoms do not improve by Thursday morning    Recording duration: 7 minutes        No follow-ups on file.        Louis Mckeon DO, 7/1/2025, 2:42 PM             [1]   Current Outpatient Medications   Medication Sig Dispense Refill    predniSONE 20 MG Oral Tab TAKE 1 TAB 3 TIMES DAILY 15 tablet 0    albuterol 108 (90 Base) MCG/ACT Inhalation Aero Soln Inhale 2 puffs into the lungs every 6 (six) hours as needed. 8.5 g 3    levocetirizine 5 MG Oral Tab Take 1 tablet (5 mg total) by mouth every morning. 90 tablet 3    guaiFENesin-codeine 100-10 MG/5ML Oral Solution Take 10 mL by mouth every 6 (six) hours as needed. Do not operate heavy machinery or drive if this makes you tired. 240 mL 0    albuterol (2.5 MG/3ML) 0.083% Inhalation Nebu Soln Take 3 mL (2.5 mg total) by nebulization every 6 (six) hours as needed for Wheezing. 180 mL 5    Respiratory Therapy Supplies (NEBULIZER/TUBING/MOUTHPIECE) Does not apply Kit Use with Albuterol nebulizer as needed 1 each 0    Omeprazole 40 MG Oral Capsule Delayed Release Take 1 capsule (40 mg total) by mouth daily. Take 1 capsule by mouth daily before breakfast. 90 capsule 3    montelukast 10 MG Oral Tab Take 1 tablet (10 mg total) by mouth every evening. 90 tablet 3    EPINEPHrine 0.3 MG/0.3ML Injection Solution Auto-injector INJECT INTRAMUSCULARLY IN EVENT OF  ALLERGIC REACTION 2 each 0    fluticasone furoate-vilanterol (BREO ELLIPTA) 200-25 MCG/ACT Inhalation Aerosol Powder, Breath Activated Inhale 1 puff into the lungs daily. 3 each 3    meclizine 25 MG Oral Tab Take  1 tablet (25 mg total) by mouth 3 (three) times daily as needed. 45 tablet 0

## 2025-07-01 NOTE — TELEPHONE ENCOUNTER
Dr. Mckeon reached out to Dr. Gottlieb to see if Krupa could get a sooner appointment.     RN spoke to patient. Offered our current first available, August 19th at 545 pm.  Patient status bumped up on wait list. Pt advised to make sure push notifications are enabled for mychart as that is typically the fastest way to accept an earlier appt.  Pt verbalizes her understanding, and is very thankful to be scheduled sooner than November.   Pt to follow Dr. Mckeon orders from visit today.    Pt would like to notate her follow up to discuss asthma medications with Dr. Gottlieb. She is afraid they're not working as well as they used to.   RN advised we will likely do a breathing test in office at her follow up to evaluate her asthma.   Pt agreeable to plan of care.

## 2025-07-08 ENCOUNTER — HOSPITAL ENCOUNTER (OUTPATIENT)
Dept: GENERAL RADIOLOGY | Age: 30
Discharge: HOME | End: 2025-07-08
Attending: FAMILY MEDICINE
Payer: COMMERCIAL

## 2025-07-08 ENCOUNTER — OFFICE VISIT (OUTPATIENT)
Dept: FAMILY MEDICINE CLINIC | Facility: CLINIC | Age: 30
End: 2025-07-08

## 2025-07-08 VITALS
HEART RATE: 73 BPM | SYSTOLIC BLOOD PRESSURE: 119 MMHG | OXYGEN SATURATION: 99 % | DIASTOLIC BLOOD PRESSURE: 81 MMHG | RESPIRATION RATE: 18 BRPM | BODY MASS INDEX: 36.19 KG/M2 | HEIGHT: 64 IN | WEIGHT: 212 LBS

## 2025-07-08 DIAGNOSIS — R05.1 ACUTE COUGH: Primary | ICD-10-CM

## 2025-07-08 DIAGNOSIS — J45.40 MODERATE PERSISTENT ASTHMA, UNSPECIFIED WHETHER COMPLICATED (HCC): ICD-10-CM

## 2025-07-08 DIAGNOSIS — R05.1 ACUTE COUGH: ICD-10-CM

## 2025-07-08 PROCEDURE — 99214 OFFICE O/P EST MOD 30 MIN: CPT | Performed by: FAMILY MEDICINE

## 2025-07-08 PROCEDURE — 71046 X-RAY EXAM CHEST 2 VIEWS: CPT | Performed by: FAMILY MEDICINE

## 2025-07-08 RX ORDER — CODEINE PHOSPHATE AND GUAIFENESIN 10; 100 MG/5ML; MG/5ML
10 SOLUTION ORAL EVERY 6 HOURS PRN
Qty: 240 ML | Refills: 0 | Status: SHIPPED | OUTPATIENT
Start: 2025-07-08

## 2025-07-08 RX ORDER — PREDNISONE 20 MG/1
TABLET ORAL
Qty: 20 TABLET | Refills: 0 | Status: SHIPPED | OUTPATIENT
Start: 2025-07-08

## 2025-07-08 RX ORDER — DOXYCYCLINE HYCLATE 100 MG
100 TABLET ORAL 2 TIMES DAILY
Qty: 20 TABLET | Refills: 0 | Status: SHIPPED | OUTPATIENT
Start: 2025-07-08

## 2025-07-08 NOTE — PROGRESS NOTES
Subjective:   Krupa Stock is a 30 year old female who presents for Chest Pressure       History/Other:   History of Present Illness  Krupa Stock is a 30 year old female with asthma who presents with worsening respiratory symptoms.    Her asthma symptoms have worsened since her last visit on July 1. A five-day course of steroids was ineffective. Increased activity leads to fatigue. She uses a rescue inhaler and breathing treatments with short-lived relief. Robitussin with codeine alleviates her cough.    She experiences sore throat, ear pain, jaw pain, and dental pain. Severe jaw pain and shortness of breath occurred while eating chips. She felt very short of breath upon entering the clinic and while driving. She reports, 'I've never felt my asthma this bad before.'    She denies nasal congestion but notes more pain in the left lung than the right. She has a history of pneumonia and 'walking pneumonia' during college. She is a non-smoker and has a dog at home, to which she is not allergic.    She called off work today due to feeling unwell and has a note for absence. Wearing a respirator mask at work improves her breathing. She is not sexually active and is allergic to penicillin.   Chief Complaint Reviewed and Verified  No Further Nursing Notes to   Review  Tobacco Reviewed  Allergies Reviewed  Medications Reviewed    Medical History Reviewed  Surgical History Reviewed  OB Status Reviewed    Family History Reviewed  Social History Reviewed         Tobacco:  She has never smoked tobacco.    Current Medications[1]           Review of Systems:  Pertinent items are noted in HPI.      Objective:   /81 (BP Location: Left arm, Patient Position: Sitting, Cuff Size: large)   Pulse 73   Resp 18   Ht 5' 4\" (1.626 m)   Wt 212 lb (96.2 kg)   LMP 07/03/2025 (Exact Date)   SpO2 99%   BMI 36.39 kg/m²  Estimated body mass index is 36.39 kg/m² as calculated from the following:    Height as of  this encounter: 5' 4\" (1.626 m).    Weight as of this encounter: 212 lb (96.2 kg).  Results         Physical Exam  CHEST: Clear to auscultation bilaterally. No wheezes, rhonchi, or crackles.  Coughing paroxysms noted on exam    Assessment & Plan:   1. Acute cough (Primary)  -     XR CHEST PA + LAT CHEST (CPT=71046); Future; Expected date: 07/08/2025  -     guaiFENesin-Codeine; Take 10 mL by mouth every 6 (six) hours as needed. Do not operate heavy machinery or drive if this makes you tired.  Dispense: 240 mL; Refill: 0  2. Moderate persistent asthma, unspecified whether complicated (HCC)  Other orders  -     Doxycycline Hyclate; Take 1 tablet (100 mg total) by mouth 2 (two) times daily.  Dispense: 20 tablet; Refill: 0  -     predniSONE; Take 1 tab four times daily  Dispense: 20 tablet; Refill: 0    Assessment & Plan  Asthma exacerbation  She is experiencing a severe asthma exacerbation with symptoms of dyspnea, fatigue, and orofacial pain. The exacerbation is unresponsive to prednisone, which previously provided relief. Oxygen saturation and respiratory rate are well-managed, but she reports significant discomfort and dyspnea, particularly without a respirator at work. An allergic component, possibly related to her dog or workplace, is suspected. A chest x-ray is warranted to evaluate lung condition. Doxycycline and albuterol are prescribed for potential infection and symptomatic relief. Robitussin with codeine will continue for cough management. Rest and avoidance of work are advised to prevent further lung inflammation.  - Order chest x-ray to evaluate lung condition  - Prescribe doxycycline  - Prescribe albuterol  - Continue Robitussin with codeine for cough  - Advise rest and no work for three days  - Provide information on managing pet allergies    Potential allergic reaction  She has a dog and reports no known allergies to it, but symptoms may be related to an allergic reaction. Improved breathing with a  respirator at work suggests potential workplace allergens. Information on managing pet allergies will be provided.  - Provide information on managing pet allergies  - Advise wearing a respirator at work to reduce exposure to potential allergens    General Health Maintenance  She is considering future family planning and aims to manage her asthma and overall health before pregnancy. Effective asthma management is crucial before considering pregnancy.  - Advise on asthma management and overall health optimization before considering pregnancy    Follow-up  Monitoring is required for her response to the new treatment regimen and any symptom changes. Immediate emergency care is advised if symptoms worsen.  - Advise follow-up in one week to assess treatment efficacy and symptom improvement  - Instruct to seek emergency care if symptoms worsen    Recording duration: 7 minutes        No follow-ups on file.        Louis Mckeon DO, 7/8/2025, 11:53 AM             [1]   Current Outpatient Medications   Medication Sig Dispense Refill    guaiFENesin-codeine 100-10 MG/5ML Oral Solution Take 10 mL by mouth every 6 (six) hours as needed. Do not operate heavy machinery or drive if this makes you tired. 240 mL 0    Doxycycline Hyclate 100 MG Oral Tab Take 1 tablet (100 mg total) by mouth 2 (two) times daily. 20 tablet 0    predniSONE 20 MG Oral Tab Take 1 tab four times daily 20 tablet 0    predniSONE 20 MG Oral Tab TAKE 1 TAB 3 TIMES DAILY 15 tablet 0    albuterol 108 (90 Base) MCG/ACT Inhalation Aero Soln Inhale 2 puffs into the lungs every 6 (six) hours as needed. 8.5 g 3    levocetirizine 5 MG Oral Tab Take 1 tablet (5 mg total) by mouth every morning. 90 tablet 3    guaiFENesin-codeine 100-10 MG/5ML Oral Solution Take 10 mL by mouth every 6 (six) hours as needed. Do not operate heavy machinery or drive if this makes you tired. 240 mL 0    albuterol (2.5 MG/3ML) 0.083% Inhalation Nebu Soln Take 3 mL (2.5 mg total) by  nebulization every 6 (six) hours as needed for Wheezing. 180 mL 5    Respiratory Therapy Supplies (NEBULIZER/TUBING/MOUTHPIECE) Does not apply Kit Use with Albuterol nebulizer as needed 1 each 0    Omeprazole 40 MG Oral Capsule Delayed Release Take 1 capsule (40 mg total) by mouth daily. Take 1 capsule by mouth daily before breakfast. 90 capsule 3    montelukast 10 MG Oral Tab Take 1 tablet (10 mg total) by mouth every evening. 90 tablet 3    EPINEPHrine 0.3 MG/0.3ML Injection Solution Auto-injector INJECT INTRAMUSCULARLY IN EVENT OF  ALLERGIC REACTION 2 each 0    fluticasone furoate-vilanterol (BREO ELLIPTA) 200-25 MCG/ACT Inhalation Aerosol Powder, Breath Activated Inhale 1 puff into the lungs daily. 3 each 3    meclizine 25 MG Oral Tab Take 1 tablet (25 mg total) by mouth 3 (three) times daily as needed. 45 tablet 0

## 2025-07-24 ENCOUNTER — HOSPITAL ENCOUNTER (OUTPATIENT)
Age: 30
Discharge: HOME | End: 2025-07-24
Attending: EMERGENCY MEDICINE

## 2025-07-24 VITALS
HEART RATE: 100 BPM | DIASTOLIC BLOOD PRESSURE: 90 MMHG | OXYGEN SATURATION: 100 % | TEMPERATURE: 98 F | SYSTOLIC BLOOD PRESSURE: 145 MMHG | RESPIRATION RATE: 18 BRPM

## 2025-07-24 DIAGNOSIS — R21 RASH OF GENITAL AREA: Primary | ICD-10-CM

## 2025-07-24 LAB
B-HCG UR QL: NEGATIVE
BILIRUB UR QL STRIP: NEGATIVE
COLOR UR: YELLOW
GLUCOSE UR STRIP-MCNC: NEGATIVE MG/DL
KETONES UR STRIP-MCNC: NEGATIVE MG/DL
NITRITE UR QL STRIP: NEGATIVE
PH UR STRIP: 7
PROT UR STRIP-MCNC: 30 MG/DL
SP GR UR STRIP: 1.02
UROBILINOGEN UR STRIP-ACNC: <2 MG/DL

## 2025-07-24 PROCEDURE — 99214 OFFICE O/P EST MOD 30 MIN: CPT

## 2025-07-24 PROCEDURE — 81002 URINALYSIS NONAUTO W/O SCOPE: CPT

## 2025-07-24 PROCEDURE — 87529 HSV DNA AMP PROBE: CPT | Performed by: EMERGENCY MEDICINE

## 2025-07-24 PROCEDURE — 81025 URINE PREGNANCY TEST: CPT

## 2025-07-24 RX ORDER — MUPIROCIN 2 %
1 OINTMENT (GRAM) TOPICAL 3 TIMES DAILY
Qty: 1 EACH | Refills: 0 | Status: SHIPPED | OUTPATIENT
Start: 2025-07-24 | End: 2025-07-25

## 2025-07-24 RX ORDER — VALACYCLOVIR HYDROCHLORIDE 1 G/1
1000 TABLET, FILM COATED ORAL EVERY 12 HOURS
Qty: 20 TABLET | Refills: 0 | Status: SHIPPED | OUTPATIENT
Start: 2025-07-24 | End: 2025-08-03

## 2025-07-24 NOTE — ED INITIAL ASSESSMENT (HPI)
Presents with 2 days of painful rash to vaginal area, dysuria, and \"difficulty sitting down.\" Took Ibuprofen for pain without relief. No fever.

## 2025-07-24 NOTE — ED PROVIDER NOTES
Patient Seen in: [facility] [place]        History  Chief Complaint   Patient presents with    Eval-G     Stated Complaint: rash, trouble urinating, allergic reaction    Subjective:   HPI            Patient is a 30-year-old female who presents with genital rash that she noticed several days ago and getting worse.  She states it is painful.  She has pain with sitting and urinating.  Denies any vaginal discharge.  Unknown if STD exposure.  Denies any fevers or bodyaches.  She just completed antibiotics for asthma exacerbation. Does shave in area. No recent hot tubs.       Objective:     Past Medical History:    Asthma (HCC)    COVID-19    Depression    Eosinophilic esophagitis    Esophageal reflux    Gastritis              Past Surgical History:   Procedure Laterality Date    Cholecystectomy  12/01/2015    Gastro - internal  02/2021    Other      left ankle    Removal gallbladder      [place] teeth removed                  Social History     Socioeconomic History    Marital status: Single   Tobacco Use    Smoking status: Never     Passive exposure: Never    Smokeless tobacco: Never    Tobacco comments:     Mom smoked when she was growing up.     Vaping Use    Vaping status: Never Used   Substance and Sexual Activity    Alcohol use: Yes     Comment: occasional    Drug use: No              Review of Systems    Positive for stated complaint: rash, trouble urinating, allergic reaction  Other systems are as noted in HPI.  Constitutional and vital signs reviewed.      All other systems reviewed and negative except as noted above.                  Physical Exam    ED Triage Vitals [07/24/25 1212]   /90   Pulse 100   Resp 18   Temp 98.2 °F (36.8 °C)   Temp src Oral   SpO2 100 %   O2 Device None (Room air)       Current Vitals:   Vital Signs  BP: 145/90  Pulse: 100  Resp: 18  Temp: 98.2 °F (36.8 °C)  Temp src: Oral    Oxygen Therapy  SpO2: 100 %  O2 Device: None (Room air)            Physical Exam  GENERAL: No acute  distress, awake and alert  HEENT: EOMI, PERRL  RESP: no tachypnea or retractions  Extremities: FROM of all extremities  /skin: +vesicular lesions in groin, labia, gluteal area. No vaginal discharge. No surrounding erythema  Neuro: CN intact, normal speech, normal gait, 5/5 motor strength in all extremities, no focal deficits          ED Course  Labs Reviewed   [facility] POCT URINALYSIS DIPSTICK - Abnormal; Notable for the following components:       Result Value    Urine Clarity Cloudy (*)     Protein urine 30 (*)     Blood, Urine Trace-Intact (*)     Leukocyte esterase urine Small (*)     All other components within normal limits   POCT PREGNANCY URINE - Normal   HSV 1/2 SUBTYPE BY PCR (LESION-ONLY)                   MDM    Medical Decision Making  Herpes swab sent  Rash looks vesicular and will treat with valtrex. Also could be folliculitis and recommend mupiricoin. Advised close f/u     Amount and/or Complexity of Data Reviewed  External Data Reviewed: radiology and notes.     Details: Recent pcp notes, cxr 7/2025 reviewed  Labs: ordered.    Risk  Prescription drug management.        Disposition and Plan     Clinical Impression:  1. Rash of genital area         Disposition:  Discharge  7/24/2025 12:34 pm    Follow-up:  Louis Mckeon, DO  [address] [place] [address]  [phone]    In 3 days            Medications Prescribed:  Current Discharge Medication List        START taking these medications    Details   mupirocin 2 % External Ointment Apply 1 Application topically 3 (three) times daily for 14 days.  Qty: 1 each, Refills: 0      valACYclovir 1 G Oral Tab Take 1 tablet (1,000 mg total) by mouth every 12 (twelve) hours for 10 days.  Qty: 20 tablet, Refills: 0                   Supplementary Documentation:

## 2025-07-25 ENCOUNTER — RESULTS FOLLOW-UP (OUTPATIENT)
Dept: FAMILY MEDICINE CLINIC | Facility: CLINIC | Age: 30
End: 2025-07-25

## 2025-07-25 ENCOUNTER — TELEPHONE (OUTPATIENT)
Dept: FAMILY MEDICINE CLINIC | Facility: CLINIC | Age: 30
End: 2025-07-25

## 2025-07-25 ENCOUNTER — OFFICE VISIT (OUTPATIENT)
Dept: FAMILY MEDICINE CLINIC | Facility: CLINIC | Age: 30
End: 2025-07-25
Payer: COMMERCIAL

## 2025-07-25 VITALS
HEART RATE: 77 BPM | DIASTOLIC BLOOD PRESSURE: 83 MMHG | BODY MASS INDEX: 36 KG/M2 | HEIGHT: 64 IN | SYSTOLIC BLOOD PRESSURE: 135 MMHG

## 2025-07-25 DIAGNOSIS — A60.00 HERPES SIMPLEX INFECTION OF GENITOURINARY SYSTEM: ICD-10-CM

## 2025-07-25 DIAGNOSIS — R30.0 BURNING WITH URINATION: Primary | ICD-10-CM

## 2025-07-25 LAB
APPEARANCE: CLEAR
BILIRUBIN: NEGATIVE
GLUCOSE (URINE DIPSTICK): NEGATIVE MG/DL
KETONES (URINE DIPSTICK): NEGATIVE MG/DL
MULTISTIX LOT#: ABNORMAL NUMERIC
NITRITE, URINE: NEGATIVE
PH, URINE: 5.5 (ref 4.5–8)
PROTEIN (URINE DIPSTICK): NEGATIVE MG/DL
SPECIFIC GRAVITY: 1.01 (ref 1–1.03)
URINE-COLOR: YELLOW
UROBILINOGEN,SEMI-QN: 0.2 MG/DL (ref 0–1.9)

## 2025-07-25 PROCEDURE — 81002 URINALYSIS NONAUTO W/O SCOPE: CPT | Performed by: FAMILY MEDICINE

## 2025-07-25 PROCEDURE — 99213 OFFICE O/P EST LOW 20 MIN: CPT | Performed by: FAMILY MEDICINE

## 2025-07-25 RX ORDER — LIDOCAINE HYDROCHLORIDE 20 MG/ML
1 JELLY TOPICAL AS NEEDED
Qty: 30 ML | Refills: 1 | Status: SHIPPED | OUTPATIENT
Start: 2025-07-25 | End: 2025-07-28

## 2025-07-25 RX ORDER — SULFAMETHOXAZOLE AND TRIMETHOPRIM 800; 160 MG/1; MG/1
1 TABLET ORAL 2 TIMES DAILY
Qty: 10 TABLET | Refills: 0 | Status: SHIPPED | OUTPATIENT
Start: 2025-07-25 | End: 2025-07-30

## 2025-07-25 RX ORDER — HYDROCODONE BITARTRATE AND ACETAMINOPHEN 10; 325 MG/1; MG/1
1 TABLET ORAL EVERY 6 HOURS PRN
Qty: 20 TABLET | Refills: 0 | Status: SHIPPED | OUTPATIENT
Start: 2025-07-25

## 2025-07-25 NOTE — PROGRESS NOTES
Subjective:   Krupa Stock is a 30 year old female who presents for Rash (Genital )       History/Other:   History of Present Illness  Krupa Stock is a 30 year old female who presents with painful genital and perianal lesions.    She experiences severe pain in the genital and perianal regions since Monday, following unprotected sexual activity with a new partner over the weekend. The pain is extremely severe, affecting the buttocks and the front, making urination and wearing pants difficult. The pain intensified after swimming in a pool and has progressively worsened.    She has never experienced similar symptoms before and denies any previous sexually transmitted infections. She has been sexually active with this partner a few times, with the most recent encounter on Saturday.    She discontinued Valtrex and mupirocin cream due to burning sensation and redness. She is allergic to penicillins and has used doxycycline in the past for asthma. The pain significantly impacts her daily life, including her ability to work. She uses showers for relief as she does not have a bathtub.    She denies being pregnant, confirmed by a negative pregnancy test. No fever or previous similar symptoms.   Chief Complaint Reviewed and Verified  Nursing Notes Reviewed and   Verified  Tobacco Reviewed  Allergies Reviewed  Medications Reviewed    Medical History Reviewed  Surgical History Reviewed  Family History   Reviewed  Social History Reviewed         Tobacco:  She has never smoked tobacco.    Current Medications[1]           Review of Systems:  Pertinent items are noted in HPI.      Objective:   /83   Pulse 77   Ht 5' 4\" (1.626 m)   LMP 07/03/2025 (Exact Date)   BMI 36.39 kg/m²  Estimated body mass index is 36.39 kg/m² as calculated from the following:    Height as of this encounter: 5' 4\" (1.626 m).    Weight as of 7/8/25: 212 lb (96.2 kg).  Results  LABS    Pregnancy test: Negative (07/25/2025)     Urinalysis: Proteinuria (07/25/2025)     Physical Exam  GENITOURINARY: External genitalia EXCORIATED VESICLES NOTED  Chafing noted around the urethra.      Assessment & Plan:   1. Burning with urination (Primary)  -     POC Urinalysis, Manual Dip without microscopy [77226]  -     Urinalysis with Culture Reflex  -     Urine Pregnancy Test  2. Herpes simplex infection of genitourinary system  -     HYDROcodone-Acetaminophen; Take 1 tablet by mouth every 6 (six) hours as needed for Pain.  Dispense: 20 tablet; Refill: 0  -     Midwife Referral - [place] ([place])  -     Urine Pregnancy Test  Other orders  -     Lidocaine HCl Urethral/Mucosal; Apply 1 mL topically as needed.  Dispense: 30 mL; Refill: 1  -     Sulfamethoxazole-Trimethoprim; Take 1 tablet by mouth 2 (two) times daily for 5 days.  Dispense: 10 tablet; Refill: 0    Assessment & Plan  Suspected Herpes Simplex Virus (HSV) Infection  Presents with painful genital and perianal lesions following recent sexual activity with a new partner and exposure to a swimming pool. Symptoms include significant discomfort and dysuria. Clinical presentation suggests HSV infection, pending confirmatory test results. Despite partner's claim of being 'clean', emphasized likelihood of HSV given symptoms and sexual history. Discussed potential allergic reaction to mupirocin cream, causing burning and redness.  - Prescribe Valtrex for HSV treatment.  - Prescribe Norco for pain management.  - Prescribe lidocaine jelly for topical pain relief.  - Refer to a nurse midwife for further gynecological evaluation.    Possible Urinary Tract Infection (UTI)  Reports dysuria, possibly due to a UTI. Proteinuria noted but not a primary concern. Differential diagnosis includes UTI or secondary bacterial infection. Allergic to penicillins, so Cefalexin not prescribed. Bactrim chosen to cover UTI and secondary infection.  - Prescribe Bactrim to cover for possible UTI and secondary bacterial  infection.    Asthma  Asthma with recent improvement after completing doxycycline course. Reports combination of doxycycline and steroids was more effective than steroids alone.  - Discontinue doxycycline as the course is completed.    Follow-up  Requires follow-up for current symptoms and to establish care with a gynecological provider. Referred to nurse midwife for quicker access to care.  - Provide a referral to a nurse midwife for gynecological follow-up.  - Provide a doctor's note for work absence due to the severity of symptoms.    Recording duration: 8 minutes        No follow-ups on file.        Louis Mckeon DO, 7/25/2025, 1:31 PM             [1]   Current Outpatient Medications   Medication Sig Dispense Refill    HYDROcodone-acetaminophen  MG Oral Tab Take 1 tablet by mouth every 6 (six) hours as needed for Pain. 20 tablet 0    lidocaine urethral/mucosal 2% External Gel Apply 1 mL topically as needed. 30 mL 1    sulfamethoxazole-trimethoprim DS (BACTRIM DS) 800-160 MG Oral Tab per tablet Take 1 tablet by mouth 2 (two) times daily for 5 days. 10 tablet 0    valACYclovir 1 G Oral Tab Take 1 tablet (1,000 mg total) by mouth every 12 (twelve) hours for 10 days. 20 tablet 0    guaiFENesin-codeine 100-10 MG/5ML Oral Solution Take 10 mL by mouth every 6 (six) hours as needed. Do not operate heavy machinery or drive if this makes you tired. 240 mL 0    albuterol 108 (90 Base) MCG/ACT Inhalation Aero Soln Inhale 2 puffs into the lungs every 6 (six) hours as needed. 8.5 g 3    levocetirizine 5 MG Oral Tab Take 1 tablet (5 mg total) by mouth every morning. 90 tablet 3    albuterol (2.5 MG/3ML) 0.083% Inhalation Nebu Soln Take 3 mL (2.5 mg total) by nebulization every 6 (six) hours as needed for Wheezing. 180 mL 5    Omeprazole 40 MG Oral Capsule Delayed Release Take 1 capsule (40 mg total) by mouth daily. Take 1 capsule by mouth daily before breakfast. 90 capsule 3    montelukast 10 MG Oral Tab Take 1  tablet (10 mg total) by mouth every evening. 90 tablet 3    fluticasone furoate-vilanterol (BREO ELLIPTA) 200-25 MCG/ACT Inhalation Aerosol Powder, Breath Activated Inhale 1 puff into the lungs daily. 3 each 3    meclizine 25 MG Oral Tab Take 1 tablet (25 mg total) by mouth 3 (three) times daily as needed. 45 tablet 0    Respiratory Therapy Supplies (NEBULIZER/TUBING/MOUTHPIECE) Does not apply Kit Use with Albuterol nebulizer as needed 1 each 0    EPINEPHrine 0.3 MG/0.3ML Injection Solution Auto-injector INJECT INTRAMUSCULARLY IN EVENT OF  ALLERGIC REACTION 2 each 0

## 2025-07-25 NOTE — TELEPHONE ENCOUNTER
Patient called (identified name and ),   Seen at [facility] yesterday for painful genital rash.   HSV test still pending.  Started valacyclovir 1 G tab and given mupirocin ointment for the rash.  The ointment makes discomfort worse.  [place] when she urinates, h as to go in the shower to void. Tried icing genital area which helped.  Also taking ibuprofen.  Wearing pants is very irritating but she has to work as a CNA and wear uniform.  She is asking Dr Mckeon what to do about the burning sensation when voiding?  Please advise?

## 2025-07-25 NOTE — TELEPHONE ENCOUNTER
Patient contacted and made aware of Dr. Mckeon's recommendations below--> agreeable. Patient verbalized understanding. No further questions or concerns at this time.    Future Appointments   Date Time Provider Department Center   7/25/2025  1:00 PM Louis Mckeon, DO [facility] [place]

## 2025-07-26 ENCOUNTER — TELEPHONE (OUTPATIENT)
Dept: FAMILY MEDICINE CLINIC | Facility: CLINIC | Age: 30
End: 2025-07-26

## 2025-07-26 NOTE — TELEPHONE ENCOUNTER
Pharmacy requesting frequency of medication be sent over.    Medications - Current[1]      lidocaine urethral/mucosal 2% External Gel, Apply 1 mL topically as needed., Disp: 30 mL, Rfl: 1        [1]   Current Outpatient Medications:     HYDROcodone-acetaminophen  MG Oral Tab, Take 1 tablet by mouth every 6 (six) hours as needed for Pain., Disp: 20 tablet, Rfl: 0    lidocaine urethral/mucosal 2% External Gel, Apply 1 mL topically as needed., Disp: 30 mL, Rfl: 1    sulfamethoxazole-trimethoprim DS (BACTRIM DS) 800-160 MG Oral Tab per tablet, Take 1 tablet by mouth 2 (two) times daily for 5 days., Disp: 10 tablet, Rfl: 0    valACYclovir 1 G Oral Tab, Take 1 tablet (1,000 mg total) by mouth every 12 (twelve) hours for 10 days., Disp: 20 tablet, Rfl: 0    guaiFENesin-codeine 100-10 MG/5ML Oral Solution, Take 10 mL by mouth every 6 (six) hours as needed. Do not operate heavy machinery or drive if this makes you tired., Disp: 240 mL, Rfl: 0    albuterol 108 (90 Base) MCG/ACT Inhalation Aero Soln, Inhale 2 puffs into the lungs every 6 (six) hours as needed., Disp: 8.5 g, Rfl: 3    levocetirizine 5 MG Oral Tab, Take 1 tablet (5 mg total) by mouth every morning., Disp: 90 tablet, Rfl: 3    albuterol (2.5 MG/3ML) 0.083% Inhalation Nebu Soln, Take 3 mL (2.5 mg total) by nebulization every 6 (six) hours as needed for Wheezing., Disp: 180 mL, Rfl: 5    Respiratory Therapy Supplies (NEBULIZER/TUBING/MOUTHPIECE) Does not apply Kit, Use with Albuterol nebulizer as needed, Disp: 1 each, Rfl: 0    Omeprazole 40 MG Oral Capsule Delayed Release, Take 1 capsule (40 mg total) by mouth daily. Take 1 capsule by mouth daily before breakfast., Disp: 90 capsule, Rfl: 3    montelukast 10 MG Oral Tab, Take 1 tablet (10 mg total) by mouth every evening., Disp: 90 tablet, Rfl: 3    EPINEPHrine 0.3 MG/0.3ML Injection Solution Auto-injector, INJECT INTRAMUSCULARLY IN EVENT OF  ALLERGIC REACTION, Disp: 2 each, Rfl: 0    fluticasone  furoate-vilanterol (BREO ELLIPTA) 200-25 MCG/ACT Inhalation Aerosol Powder, Breath Activated, Inhale 1 puff into the lungs daily., Disp: 3 each, Rfl: 3    meclizine 25 MG Oral Tab, Take 1 tablet (25 mg total) by mouth 3 (three) times daily as needed., Disp: 45 tablet, Rfl: 0

## 2025-07-28 ENCOUNTER — TELEPHONE (OUTPATIENT)
Dept: FAMILY MEDICINE CLINIC | Facility: CLINIC | Age: 30
End: 2025-07-28

## 2025-07-28 LAB
HSV 1 NAA: POSITIVE
HSV 1 NAA: POSITIVE
HSV 2 NAA: NEGATIVE
HSV 2 NAA: NEGATIVE

## 2025-07-28 RX ORDER — LIDOCAINE HYDROCHLORIDE 20 MG/ML
1 JELLY TOPICAL 3 TIMES DAILY
Qty: 30 ML | Refills: 1 | Status: SHIPPED | OUTPATIENT
Start: 2025-07-28

## 2025-07-28 NOTE — TELEPHONE ENCOUNTER
[Last office visit was on 7/25/25 with Dr. Mckeon, [facility] visit on 7/24/25]    Patient called states she had some tests at [facility] on 7/24/25 and has called for results and has not heard back--> she is aware of + HSV 1 --> clinical references sent via [employer] message. She is asking we take a look at results for her. She picked up Rxs from visit with Dr. Mckeon afterwards 7/25/25. She also states the Rx for the lidocaine urethral/mucosal 2% External Gel was not dispensed as the pharmacy needed the frequency for Rx. She reports symptoms have improved, but she has some redness at vulva, some some bumps present, no sores are longer present, they look like red infected pimples. She denies any discharge, unable to assess foul odor [has not had sense of smell or taste since previous Covid infection]. Some vaginal itching present at vulva. She has taken antibiotics, she has 2 doses left of the Bactrim DS, she has had relief from taking this. She is also taking the valACYclovir 1 G Oral Tab, she has 12 pills left. She also states she has not performed urine tests ordered by Dr. Mckeon as she has not been able to urinate in specimen cup as it was too painful. She is now able to provide specimen and will go to lab tomorrow to provide a sample, the clean catch urine instructions were provided [below] - she would like to know when she should perform the test. She has a visit scheduled tomorrow with OBGYN. I made her aware I will convey the above to Radha Prieto PA-C covering for Dr. Mckeon who is out of the office. Patient verbalized understanding. No further questions or concerns at this time.     Radha Prieto PA-C: please advise:    1) will you prescribe something for possible yeast infection, patient has been on antibiotics and has itching/redness present at vulva    2) when should patient perform urine testing    3)Rx for lidocaine urethral/mucosal 2% External Gel needs frequency for pharmacy  to dispense; Rx pended - please complete the frequency and send Rx if appropriate.     Future Appointments   Date Time Provider Department Center   7/29/2025  8:30 AM Cindy Del Cid CNM [facility] [place]      Instructions provided to patient for clean catch urine:  1. Prepare supplies: Unscrew the lid of the urine specimen cup. Place the lid lip-up on the counter. To avoid contamination, do not touch the inside of the cup or lid. Wash your hands. Get wipes ready/open packaging.    2. Cleanse with the towelettes as followed: Separate the labia [vaginal lips], which are the folds of skin on either side of the area from which you urinate [make sure to keep the vaginal lips spread open until the specimen is collected]. Wipe the inner folds of skin from front to back in a single motion with each towelette and throw away after each single wiping motion [you should be given 2 wipes]. Make sure to keep the labia [vaginal lips]  while collecting the urine sample.    3. Urinate a small amount into the toilet.    4. Place the collection cup under the stream of urine and continue to urinate into the  cup. Once the collection cup is about 3/4 full, finish urinating into the toilet.    5. Replace the lid on the cup and tighten the lid securely.

## 2025-07-28 NOTE — TELEPHONE ENCOUNTER
Lab results reviewed. Please submit urine sample. New Rx was sent to the pharmacy. Lab result showed positive for HSV1. F/U with Gyne tomorrow for vaginal itching/rash.

## 2025-07-28 NOTE — TELEPHONE ENCOUNTER
Attempted to call the patient - no answer. Left a voicemail for the patient to call the office back. Also sent a my chart message as well.

## 2025-07-29 ENCOUNTER — OFFICE VISIT (OUTPATIENT)
Dept: OBGYN CLINIC | Facility: CLINIC | Age: 30
End: 2025-07-29

## 2025-07-29 ENCOUNTER — LAB ENCOUNTER (OUTPATIENT)
Dept: LAB | Age: 30
End: 2025-07-29
Attending: ADVANCED PRACTICE MIDWIFE

## 2025-07-29 VITALS
WEIGHT: 210 LBS | BODY MASS INDEX: 35.85 KG/M2 | HEIGHT: 64 IN | HEART RATE: 88 BPM | SYSTOLIC BLOOD PRESSURE: 131 MMHG | DIASTOLIC BLOOD PRESSURE: 82 MMHG

## 2025-07-29 DIAGNOSIS — Z11.3 SCREEN FOR STD (SEXUALLY TRANSMITTED DISEASE): ICD-10-CM

## 2025-07-29 DIAGNOSIS — Z01.419 WOMEN'S ANNUAL ROUTINE GYNECOLOGICAL EXAMINATION: Primary | ICD-10-CM

## 2025-07-29 DIAGNOSIS — R39.9 UTI SYMPTOMS: ICD-10-CM

## 2025-07-29 DIAGNOSIS — Z30.09 BIRTH CONTROL COUNSELING: ICD-10-CM

## 2025-07-29 DIAGNOSIS — A60.04 HERPES SIMPLEX VULVOVAGINITIS: ICD-10-CM

## 2025-07-29 LAB
C TRACH DNA SPEC QL NAA+PROBE: NEGATIVE
HBV SURFACE AG SER-ACNC: <0.1
HBV SURFACE AG SERPL QL IA: NONREACTIVE
HCV AB SERPL QL IA: NONREACTIVE
N GONORRHOEA DNA SPEC QL NAA+PROBE: NEGATIVE
T PALLIDUM AB SER QL IA: NONREACTIVE

## 2025-07-29 PROCEDURE — 87491 CHLMYD TRACH DNA AMP PROBE: CPT

## 2025-07-29 PROCEDURE — 90471 IMMUNIZATION ADMIN: CPT | Performed by: ADVANCED PRACTICE MIDWIFE

## 2025-07-29 PROCEDURE — 86780 TREPONEMA PALLIDUM: CPT

## 2025-07-29 PROCEDURE — 36415 COLL VENOUS BLD VENIPUNCTURE: CPT

## 2025-07-29 PROCEDURE — 86803 HEPATITIS C AB TEST: CPT

## 2025-07-29 PROCEDURE — 87591 N.GONORRHOEAE DNA AMP PROB: CPT

## 2025-07-29 PROCEDURE — 87340 HEPATITIS B SURFACE AG IA: CPT

## 2025-07-29 PROCEDURE — 87389 HIV-1 AG W/HIV-1&-2 AB AG IA: CPT

## 2025-07-29 PROCEDURE — 99395 PREV VISIT EST AGE 18-39: CPT | Performed by: ADVANCED PRACTICE MIDWIFE

## 2025-07-29 PROCEDURE — 90651 9VHPV VACCINE 2/3 DOSE IM: CPT | Performed by: ADVANCED PRACTICE MIDWIFE

## 2025-07-29 PROCEDURE — 87801 DETECT AGNT MULT DNA AMPLI: CPT

## 2025-07-29 RX ORDER — VALACYCLOVIR HYDROCHLORIDE 1 G/1
1000 TABLET, FILM COATED ORAL DAILY
Qty: 90 TABLET | Refills: 3 | Status: SHIPPED | OUTPATIENT
Start: 2025-07-29

## 2025-07-29 RX ORDER — DESOGESTREL AND ETHINYL ESTRADIOL 0.15-0.03
1 KIT ORAL DAILY
Qty: 84 TABLET | Refills: 0 | Status: SHIPPED | OUTPATIENT
Start: 2025-07-29

## 2025-08-19 ENCOUNTER — OFFICE VISIT (OUTPATIENT)
Dept: ALLERGY | Facility: CLINIC | Age: 30
End: 2025-08-19

## 2025-08-19 VITALS — BODY MASS INDEX: 41.99 KG/M2 | HEIGHT: 63 IN | WEIGHT: 237 LBS

## 2025-08-19 DIAGNOSIS — K20.0 EOSINOPHILIC ESOPHAGITIS: ICD-10-CM

## 2025-08-19 DIAGNOSIS — Z23 NEED FOR COVID-19 VACCINE: ICD-10-CM

## 2025-08-19 DIAGNOSIS — Z23 FLU VACCINE NEED: ICD-10-CM

## 2025-08-19 DIAGNOSIS — J45.30 MILD PERSISTENT EXTRINSIC ASTHMA WITHOUT COMPLICATION (HCC): ICD-10-CM

## 2025-08-19 DIAGNOSIS — J30.2 SEASONAL AND PERENNIAL ALLERGIC RHINOCONJUNCTIVITIS: Primary | ICD-10-CM

## 2025-08-19 DIAGNOSIS — J30.89 SEASONAL AND PERENNIAL ALLERGIC RHINOCONJUNCTIVITIS: Primary | ICD-10-CM

## 2025-08-19 DIAGNOSIS — Z23 NEED FOR PROPHYLACTIC VACCINATION WITH STREPTOCOCCUS PNEUMONIAE (PNEUMOCOCCUS) AND INFLUENZA VACCINES: ICD-10-CM

## 2025-08-19 DIAGNOSIS — Z88.0 PENICILLIN ALLERGY: ICD-10-CM

## 2025-08-19 DIAGNOSIS — H10.10 SEASONAL AND PERENNIAL ALLERGIC RHINOCONJUNCTIVITIS: Primary | ICD-10-CM

## 2025-08-19 LAB
FEF 25-75%: 4.15 L/S
FET: 8.2 S
FEV1/FVC: 0.78
FEV1: 4.33 L
FVC: 5.52 L
PEF: 441 L/MIN

## 2025-08-19 PROCEDURE — 94010 BREATHING CAPACITY TEST: CPT | Performed by: ALLERGY & IMMUNOLOGY

## 2025-08-19 PROCEDURE — 99214 OFFICE O/P EST MOD 30 MIN: CPT | Performed by: ALLERGY & IMMUNOLOGY

## 2025-08-19 RX ORDER — FLUTICASONE FUROATE, UMECLIDINIUM BROMIDE AND VILANTEROL TRIFENATATE 200; 62.5; 25 UG/1; UG/1; UG/1
1 POWDER RESPIRATORY (INHALATION) DAILY
Qty: 3 EACH | Refills: 0 | Status: SHIPPED | OUTPATIENT
Start: 2025-08-19

## 2025-08-19 RX ORDER — ALBUTEROL SULFATE 90 UG/1
2 INHALANT RESPIRATORY (INHALATION) ONCE
Status: SHIPPED | OUTPATIENT
Start: 2025-08-19

## (undated) DEVICE — CATH BALLOON CRE 15-18MM 5837

## (undated) DEVICE — Device: Brand: DUAL NARE NASAL CANNULAE FEMALE LUER CON 7FT O2 TUBE

## (undated) DEVICE — KIT ENDO ORCAPOD 160/180/190

## (undated) DEVICE — FORCEP RADIAL JAW 4

## (undated) DEVICE — DEVICE INFL 60ML 15ATM PRSS COOK SPHR

## (undated) DEVICE — SYRINGE/GUAGE ASSEMBLY

## (undated) DEVICE — KIT CLEAN ENDOKIT 1.1OZ GOWNX2

## (undated) DEVICE — 35 ML SYRINGE REGULAR TIP: Brand: MONOJECT

## (undated) DEVICE — Device: Brand: DEFENDO AIR/WATER/SUCTION AND BIOPSY VALVE

## (undated) DEVICE — Device: Brand: CUSTOM PROCEDURE KIT

## (undated) DEVICE — LASSO POLYPECTOMY SNARE: Brand: LASSO

## (undated) DEVICE — ESOPHAGEAL/PYLORIC/COLONIC WIREGUIDED BALLOON DILATATION CATHETER: Brand: CRE WIREGUIDED

## (undated) DEVICE — LINE MNTR ADLT SET O2 INTMD

## (undated) DEVICE — MEDI-VAC NON-CONDUCTIVE SUCTION TUBING: Brand: CARDINAL HEALTH

## (undated) DEVICE — MEDI-VAC NON-CONDUCTIVE SUCTION TUBING 6MM X 1.8M (6FT.) L: Brand: CARDINAL HEALTH

## (undated) DEVICE — CONMED SCOPE SAVER BITE BLOCK, 20X27 MM: Brand: SCOPE SAVER

## (undated) DEVICE — 60 ML SYRINGE REGULAR TIP: Brand: MONOJECT

## (undated) DEVICE — YANKAUER,BULB TIP,W/O VENT,RIGID,STERILE: Brand: MEDLINE

## (undated) DEVICE — HERCULES 3 STAGE BALLOON ESOPHAGEAL: Brand: HERCULES

## (undated) DEVICE — CLIP LGT 11MM OPEN 2.8MM 235CM

## (undated) NOTE — LETTER
Date & Time: 8/28/2018, 3:05 PM  Patient: Titus Gutiérrez  Encounter Provider(s):    Kong Christianson MD       To Whom It May Concern:    Bienvenido Calixto was seen and treated in our department on 8/27/2018. She may return to work on 08/29/2018.     If you ha

## (undated) NOTE — LETTER
Date & Time: 12/10/2023, 9:20 PM  Patient: [facility]  Encounter Provider(s):    Candy Roy DO       To Whom It May Concern:    Carl Person was seen and treated in our department on 12/10/2023. She should not return to work until 12/14/2023 .     If you have any questions or concerns, please do not hesitate to call.        _____________________________  Physician/APC Signature

## (undated) NOTE — LETTER
Date & Time: 11/3/2023, 6:38 PM  Patient: Dionicia Baumgarten  Encounter Provider(s):    Yaya Gandara MD       To Whom It May Concern:    Almas Bradley was seen and treated in our department on 11/3/2023. She should not return to work until 11/13/2023 .     If you have any questions or concerns, please do not hesitate to call.        _____________________________  Physician/APC Signature

## (undated) NOTE — LETTER
7/25/2025          To Whom It May Concern:    Krupa Stock is currently under my medical care and may not return to work at this time.    Please excuse Krupa for 3 days.  She may return to work on 07/28/2025.  Activity is restricted as follows: none.    If you require additional information please contact our office.        Sincerely,      Louis Mckeon, DO          Document generated by:  Louis Mckeon, DO

## (undated) NOTE — LETTER
02/06/18    Hayes Awad is under my care and has a severe allergic reaction to latex and vinyl gloves. She needs to use powder free nitrile gloves. If you have any questions feel free to call my office. Sincerely,      Elijah Serna. Kale Felipe P

## (undated) NOTE — LETTER
8/15/2024    Krupa Stock        [address] [place] [address]            Dear Krupa Stock,      Our records indicate that you are due for an appointment for an EGD or Upper Endoscopy with Mohamud Ureña MD. Our doctors are booking out about 3-6 months in advance for procedures.     Please call our office to schedule a phone screening appointment to plan for the procedure(s).   Your medical well-being is important to us.    If your insurance requires a referral, please call your primary care office to request one.      Thank you,      The Physicians and Staff at [facility]

## (undated) NOTE — LETTER
Date & Time: 1/18/2020, 10:04 AM  Patient: Philip Montero  Encounter Provider(s):    Marla Hopper MD       To Whom It May Concern:    Janis Mckenzie was seen and treated in our department on 1/18/2020.  She may return to work 1/20/2020    If you have

## (undated) NOTE — LETTER
3/3/2023          To Whom It May Concern:    Isaias Galeas is currently under my medical care and may not return to work at this time. She has tested positive for Covid 19. She may return to work on 3/7/2023. Activity is restricted as follows: none. If you require additional information please contact our office.         Sincerely,    TIFFANY Ho, FNP-BC            Document generated by:  TIFFANY Ho

## (undated) NOTE — LETTER
[facility]  Administration of Anesthesia  I, Krupa LisaCatherine Stock agree to be cared for by a physician anesthesiologist alone and/or with a nurse anesthetist, who is specially trained to monitor me and give me medicine to put me to sleep or keep me comfortable during my procedure    I understand that my anesthesiologist and/or anesthetist is not an employee or agent of [facility] or [employer]. He or she works for [facility]    As the patient asking for anesthesia services, I agree to:  Allow the anesthesiologist (anesthesia doctor) to give me medicine and do additional procedures as necessary. Some examples are: Starting or using an “IV” to give me medicine, fluids or blood during my procedure, and having a breathing tube placed to help me breathe when I’m asleep (intubation). In the event that my heart stops working properly, I understand that my anesthesiologist will make every effort to sustain my life, unless otherwise directed by [facility] Do Not Resuscitate documents.  Tell my anesthesia doctor before my procedure:  If I am pregnant.  The last time that I ate or drank.  iii. All of the medicines I take (including prescriptions, herbal supplements, and pills I can buy without a prescription (including street drugs/illegal medications). Failure to inform my anesthesiologist about these medicines may increase my risk of anesthetic complications.  iv.If I am allergic to anything or have had a reaction to anesthesia before.  I understand how the anesthesia medicine will help me (benefits).  I understand that with any type of anesthesia medicine there are risks:  The most common risks are: nausea, vomiting, sore throat, muscle soreness, damage to my eyes, mouth, or teeth (from breathing tube placement).  Rare risks include: remembering what happened during my procedure, allergic reactions to medications, injury to my airway, heart, lungs, vision, nerves, or  muscles and in extremely rare instances death.  My doctor has explained to me other choices available to me for my care (alternatives).  Pregnant Patients (“epidural”):  I understand that the risks of having an epidural (medicine given into my back to help control pain during labor), include itching, low blood pressure, difficulty urinating, headache or slowing of the baby’s heart. Very rare risks include infection, bleeding, seizure, irregular heart rhythms and nerve injury.  Regional Anesthesia (“spinal”, “epidural”, & “nerve blocks”):  I understand that rare but potential complications include headache, bleeding, infection, seizure, irregular heart rhythms, and nerve injury.    _____________________________________________________________________________  Patient (or Representative) Signature/Relationship to Patient  Date   Time    _____________________________________________________________________________   Name (if used)    Language/Organization   Time    _____________________________________________________________________________  Nurse Anesthetist Signature     Date   Time  _____________________________________________________________________________  Anesthesiologist Signature     Date   Time  I have discussed the procedure and information above with the patient (or patient’s representative) and answered their questions. The patient or their representative has agreed to have anesthesia services.    _____________________________________________________________________________  Witness        Date   Time  I have verified that the signature is that of the patient or patient’s representative, and that it was signed before the procedure  Patient Name: Krupa Josephthea     : 1995                 Printed: 2024 at 1:08 PM    Medical Record #: [id number]                                            Page 1 of 1  ----------ANESTHESIA CONSENT----------

## (undated) NOTE — LETTER
12/12/2023          To Whom It May Concern:    Rhianna Mart is currently under my medical care and may not return to work at this time. Please excuse Mary Hicks for 3 days. She may return to work on 12/16/2023. Activity is restricted as follows: none. If you require additional information please contact our office. Sincerely,      Laverne Pablo. Serena Ambrosio,           Document generated by:  Laverne Pablo.  DO Mike

## (undated) NOTE — LETTER
Date & Time: 6/15/2025, 11:33 PM  Patient: Krupa Stock  Encounter Provider(s):    Lakeisha Luevano MD       To Whom It May Concern:    Krupa Stock was seen and treated in our department on 6/15/2025. She should not return to work until 6/17/2025.    If you have any questions or concerns, please do not hesitate to call.        _____________________________  Physician/APC Signature

## (undated) NOTE — LETTER
CHRISTIANO Notifier: Bird/Pickie   JYOTI. Patient Name: Maxime Jj. Identification Number: [id number]      Advance Beneficiary Notice of Noncoverage (ABN)  NOTE:  If Medicare doesn’t pay for D. Trigger point injections  below, you may have to pay.   Med ? OPTION 2. I want the D. Trigger point injections  listed above, but do not bill Medicare. You may ask to be paid now as I am responsible for payment. I cannot appeal if Medicare is not billed. ? OPTION 3. I don’t want the D. listed above.  I understand

## (undated) NOTE — ED AVS SNAPSHOT
[facility] Emergency Department  Shasha [address]   [address] [place] [address]  Phone:  [phone]  Fax:  Mahi   MRN: [id number]    Department:  [facility] Emergency Department   Date of Visit:  7/15/2017 visiting [email or web address].    IF THERE IS ANY CHANGE OR WORSENING OF YOUR CONDITION, CALL YOUR PRIMARY CARE PHYSICIAN AT ONCE OR RETURN IMMEDIATELY TO THE EMERGENCY DEPARTMENT.     If you have been prescribed any medication(s), please fill your prescription

## (undated) NOTE — LETTER
Date & Time: 7/24/2025, 12:38 PM  Patient: Krupa Stock  Encounter Provider(s):    Lakeisha Luevano MD       To Whom It May Concern:    Krupa Stock was seen and treated in our department on 7/24/2025. She should not return to work until 7/25/2025.    If you have any questions or concerns, please do not hesitate to call.        _____________________________  Physician/APC Signature

## (undated) NOTE — LETTER
Date & Time: 8/9/2024, 1:17 AM  Patient: Krupa Stock  Encounter Provider(s):    Dwayne Rowley MD       To Whom It May Concern:    Krupa Stock was seen and treated in our department on 8/8/2024 through 8/9/2024. She should not return to school until Monday 8/12/2024 .    If you have any questions or concerns, please do not hesitate to call.        _____________________________  Physician/APC Signature

## (undated) NOTE — LETTER
AUTHORIZATION FOR SURGICAL OPERATION OR OTHER PROCEDURE    1.  I hereby authorize Dr. Heather Harrell  and the [facility] Office staff assigned to my case to perform the following operation and/or procedure at the [facility] Office:    Trigger point injections   _____________ Time:  ________ A. M.  P.M.        Patient Name:  Artur Mack [id number] 6/4/1995         Patient signature:  ___________________________________________________             Relationship to Patient:           []  Parent    Responsible pers

## (undated) NOTE — LETTER
Date & Time: 12/7/2020, 12:25 PM  Patient: Jonna Heard  Encounter Provider(s):    Laron Pond MD       To Whom It May Concern:    Alda Bonilla was seen and treated in our department on 12/7/2020. She should not return to work until 12/9/2020.

## (undated) NOTE — LETTER
6/13/2025          To Whom It May Concern:    Krupa Stock is currently under my medical care and may not return to work at this time.    Please excuse Krupa for 3 days.  She may return to work on 06/16/2025.  Activity is restricted as follows: none.    If you require additional information please contact our office.        Sincerely,      Louis Mckeon, DO          Document generated by:  Louis Mckeon, DO

## (undated) NOTE — LETTER
12/13/2023      To Whom It May Concern:    Enio Skinner is currently under my medical care and may not return to work at this time. Please excuse Murtaza Prater for 5 days. She may return to work on 12/18/23. Activity is restricted as follows: none. If you require additional information please contact our office. Sincerely,    Rubia Orozco.  DO [address]  [address] [facility]   [phone]      Document generated by:  Leslie Cervantes RN

## (undated) NOTE — LETTER
Date & Time: 10/27/2023, 5:02 PM  Patient: Enio Skinner      To Whom It May Concern:    Chuck Austin was seen and treated in our department on 10/24/2023. She should not return to work until 11/6/2023 . If you have any questions or concerns, please do not hesitate to call. Fady Villar.  Kelly Condon MD

## (undated) NOTE — LETTER
No referring provider defined for this encounter.       02/20/24        Patient: Krupa Stock   YOB: 1995   Date of Visit: 2/20/2024       Dear   Mari     Thank you for referring Krupa Stock to my practice.  Please find my assessment and plan below.        #1 eosinophilic esophagitis  Followed by GI.  Last endoscopy was in 2021.  Biopsy report from none reviewed  Still with current symptoms in spite of proton pump libido twice a day.  Previous course of swallowed budesonide in the past without improvement  Patient to have repeat EGD in May 2024 with GI  See above skin testing to reevaluate for allergic triggers both food and environmental allergies  Continue with proton pump inhibitor twice a day  Trial of swallowed inhaled corticosteroid via HFA 2 puffs swallowed twice a day.  Do not eat or drink for 30 minutes afterwards  May consider trial of elimination diet starting with milk and wheat for approximately 6 to 8 weeks  If not improving may consider Dupixent if repeat EGD in May 2024 still shows EOE.    Reviewed with patient that GI should also have the ability to prescribe Dupixent as a biologic if needed  Reviewed approval approval process for Dupixent can take a month    #2 asthma  Continue with Breo and Singulair  Albuterol as needed.  Denies persistent asthma symptoms since starting Breo.  Reviewed signs and symptoms of persistent asthma including the rules of 2  Reviewed goals of treatment her asthma symptoms 2 days/week or less albuterol usage 2 days/week or less and prednisone once a year last    #3 allergic rhinitis  Reviewed avoidance measures and potential treatment option immunotherapy  May consider Xyzal, levocetirizine 5 mg once a day as an antihistamine  Flonase 2 sprays per nostril once a day if having prominent nasal congestion or postnasal drip    #4 COVID-vaccine booster recommended reviewed I do not have the booster my office    5.  Flu vaccine  up-to-date    #6 recommend pneumonia vaccine with Prevnar 20 given history of asthma               Sincerely,   Isaac Gottlieb MD   [address] [address]    Document electronically generated by:  Isaac Gottlieb MD

## (undated) NOTE — LETTER
[id number]  [phone]     Patient: Rupali Pierce   YOB: 1995   Date of Visit: 12/21/2020     Dear Employer,        December 21, 2020    At [facility], we a Persons infected with SARS-CoV-2 who never develop COVID-19 symptoms may discontinue isolation and other precautions 10 days after the date of their first positive RT-PCR test for SARS-CoV-2 RNA.     Persons who are asymptomatic but have been exposed, CDC r

## (undated) NOTE — LETTER
1/22/2025      To Whom It May Concern:    Krupa Stock is currently under my medical care and may not return to work at this time.    Please excuse Krupa for 3 days.  She may return to work on 1/24/25.  Activity is restricted as follows: none.    If you require additional information please contact our office.      Sincerely,    Louis Mckeon,  [address] [place] [address]  Ph: [phone]  Fax: [phone]                   Document generated by:  Loida KEARNEY RN

## (undated) NOTE — LETTER
12/15/2023          To Whom It May Concern:    Rhianna Mart is currently under my medical care and may not return to work at this time. Please excuse Mary Hicks for 3 days. She may return to work on 12/18/2023. Activity is restricted as follows: none. If you require additional information please contact our office. Sincerely,      Laverne Pablo. Serena Ambrosio,           Document generated by:  Laverne Pablo.  DO Mike

## (undated) NOTE — LETTER
1/14/2025      To Whom It May Concern:    Krupa Stock was seen in my office today, 01/14/2024 and tested positive for Influenza A.     If you require additional information please contact our office.      Sincerely,      Louis Mckeon,  [address] [place] [address]  Ph: [phone]  Fax: [phone]       Document generated by:  Geni MONTES RN

## (undated) NOTE — LETTER
1/21/2025          To Whom It May Concern:    Krupa Stock is currently under my medical care and may not return to work at this time.    Please excuse Krupa for 3 days.  She may return to work on 01/4/2025.  Activity is restricted as follows: none.    If you require additional information please contact our office.        Sincerely,      Louis Mckeon, DO          Document generated by:  Louis Mckeon, DO

## (undated) NOTE — LETTER
Date & Time: 12/7/2023, 12:18 PM  Patient: Jase Whitaker  [place] Provider(s):    Janice Pérez PA-C       To Whom It May Concern:    Clau Gonsalves was seen and treated in our department on 12/7/2023. She can return to work 12/11/2023.     If you have any questions or concerns, please do not hesitate to call.        _____________________________  Physician/APC Signature

## (undated) NOTE — LETTER
03/04/21        Nicole Stock  [address] [facility]      Dear Antonio Smith records indicate that you have outstanding lab work and or testing that was ordered for you and has not yet been completed:     CALPROTECTIN, FECAL   CDIFFICILE T

## (undated) NOTE — LETTER
Date & Time: 9/5/2024, 1:31 AM  Patient: Krupa Stock  Encounter Provider(s):    Lakeisha Luevano MD       To Whom It May Concern:    Krupa Stock was seen and treated in our department on 9/5/2024. She should not return to work until 9/7/2024 .    If you have any questions or concerns, please do not hesitate to call.        _____________________________  Physician/APC Signature

## (undated) NOTE — LETTER
7/8/2025          To Whom It May Concern:    Krupa Stock is currently under my medical care and may not return to work at this time.    Please excuse Krupa for 3 days.  She may return to work on 07/11/2025.  Activity is restricted as follows: none.    If you require additional information please contact our office.        Sincerely,      Louis Mckeon, DO          Document generated by:  Louis Mckeon, DO

## (undated) NOTE — LETTER
[facility]  [address], [place], [place]    Authorization for Surgical Operation and Procedure                               I hereby authorize Mohamud Ureña MD, my physician and his/her assistants (if applicable), which may include medical students, residents, and/or fellows, to perform the following surgical operation/ procedure and administer such anesthesia as may be determined necessary by my physician: Operation/Procedure name (s) ESOPHAGOGASTRODUODENOSCOPY with Dilatation on Krupa Stock   2.   I recognize that during the surgical operation/procedure, unforeseen conditions may necessitate additional or different procedures than those listed above.  I, therefore, further authorize and request that the above-named surgeon, assistants, or designees perform such procedures as are, in their judgment, necessary and desirable.    3.   My surgeon/physician has discussed prior to my surgery the potential benefits, risks and side effects of this procedure; the likelihood of achieving goals; and potential problems that might occur during recuperation.  They also discussed reasonable alternatives to the procedure, including risks, benefits, and side effects related to the alternatives and risks related to not receiving this procedure.  I have had all my questions answered and I acknowledge that no guarantee has been made as to the result that may be obtained.    4.   Should the need arise during my operation/procedure, which includes change of level of care prior to discharge, I also consent to the administration of blood and/or blood products.  Further, I understand that despite careful testing and screening of blood or blood products by collecting agencies, I may still be subject to ill effects as a result of receiving a blood transfusion and/or blood products.  The following are some, but not all, of the potential risks that can occur: fever and allergic reactions, hemolytic reactions,  transmission of diseases such as Hepatitis, AIDS and Cytomegalovirus (CMV) and fluid overload.  In the event that I wish to have an autologous transfusion of my own blood, or a directed donor transfusion, I will discuss this with my physician.  Check only if Refusing Blood or Blood Products  I understand refusal of blood or blood products as deemed necessary by my physician may have serious consequences to my condition to include possible death. I hereby assume responsibility for my refusal and release the hospital, its personnel, and my physicians from any responsibility for the consequences of my refusal.    o  Refuse   5.   I authorize the use of any specimen, organs, tissues, body parts or foreign objects that may be removed from my body during the operation/procedure for diagnosis, research or teaching purposes and their subsequent disposal by hospital authorities.  I also authorize the release of specimen test results and/or written reports to my treating physician on the hospital medical staff or other referring or consulting physicians involved in my care, at the discretion of the Pathologist or my treating physician.    6.   I consent to the photographing or videotaping of the operations or procedures to be performed, including appropriate portions of my body for medical, scientific, or educational purposes, provided my identity is not revealed by the pictures or by descriptive texts accompanying them.  If the procedure has been photographed/videotaped, the surgeon will obtain the original picture, image, videotape or CD.  The hospital will not be responsible for storage, release or maintenance of the picture, image, tape or CD.    7.   I consent to the presence of a  or observers in the operating room as deemed necessary by my physician or their designees.    8.   I recognize that in the event my procedure results in extended X-Ray/fluoroscopy time, I may develop a skin reaction.    9. If  I have a Do Not Attempt Resuscitation (DNAR) order in place, that status will be suspended while in the operating room, procedural suite, and during the recovery period unless otherwise explicitly stated by me (or a person authorized to consent on my behalf). The surgeon or my attending physician will determine when the applicable recovery period ends for purposes of reinstating the DNAR order.  10. Patients having a sterilization procedure: I understand that if the procedure is successful the results will be permanent and it will therefore be impossible for me to inseminate, conceive, or bear children.  I also understand that the procedure is intended to result in sterility, although the result has not been guaranteed.   11. I acknowledge that my physician has explained sedation/analgesia administration to me including the risk and benefits I consent to the administration of sedation/analgesia as may be necessary or desirable in the judgment of my physician.    I CERTIFY THAT I HAVE READ AND FULLY UNDERSTAND THE ABOVE CONSENT TO OPERATION and/or OTHER PROCEDURE.     ____________________________________  _________________________________        ______________________________  Signature of Patient    Signature of Responsible Person                Printed Name of Responsible Person                                      ____________________________________  _____________________________                ________________________________  Signature of Witness        Date  Time         Relationship to Patient    STATEMENT OF PHYSICIAN My signature below affirms that prior to the time of the procedure; I have explained to the patient and/or his/her legal representative, the risks and benefits involved in the proposed treatment and any reasonable alternative to the proposed treatment. I have also explained the risks and benefits involved in refusal of the proposed treatment and alternatives to the proposed treatment and have  answered the patient's questions. If I have a significant financial interest in a co-management agreement or a significant financial interest in any product or implant, or other significant relationship used in this procedure/surgery, I have disclosed this and had a discussion with my patient.     _____________________________________________________              _____________________________  (Signature of Physician)                                                                                         (Date)                                   (Time)  Patient Name: Krupa Kanika Stock      : 1995      Printed: 2024     Medical Record #: [id number]                                      Page 1 of 1